# Patient Record
Sex: FEMALE | Race: WHITE | NOT HISPANIC OR LATINO | Employment: FULL TIME | URBAN - METROPOLITAN AREA
[De-identification: names, ages, dates, MRNs, and addresses within clinical notes are randomized per-mention and may not be internally consistent; named-entity substitution may affect disease eponyms.]

---

## 2018-10-11 ENCOUNTER — OFFICE VISIT - RIVER FALLS (OUTPATIENT)
Dept: FAMILY MEDICINE | Facility: CLINIC | Age: 38
End: 2018-10-11

## 2018-10-11 ASSESSMENT — MIFFLIN-ST. JEOR: SCORE: 1490.95

## 2018-12-27 ENCOUNTER — OFFICE VISIT - RIVER FALLS (OUTPATIENT)
Dept: FAMILY MEDICINE | Facility: CLINIC | Age: 38
End: 2018-12-27

## 2018-12-27 ASSESSMENT — MIFFLIN-ST. JEOR: SCORE: 1451.94

## 2019-04-15 ENCOUNTER — OFFICE VISIT - RIVER FALLS (OUTPATIENT)
Dept: FAMILY MEDICINE | Facility: CLINIC | Age: 39
End: 2019-04-15

## 2019-04-15 ASSESSMENT — MIFFLIN-ST. JEOR: SCORE: 1451.94

## 2019-05-02 ENCOUNTER — COMMUNICATION - RIVER FALLS (OUTPATIENT)
Dept: FAMILY MEDICINE | Facility: CLINIC | Age: 39
End: 2019-05-02

## 2019-05-28 ENCOUNTER — COMMUNICATION - RIVER FALLS (OUTPATIENT)
Dept: FAMILY MEDICINE | Facility: CLINIC | Age: 39
End: 2019-05-28

## 2019-07-29 ENCOUNTER — OFFICE VISIT - RIVER FALLS (OUTPATIENT)
Dept: FAMILY MEDICINE | Facility: CLINIC | Age: 39
End: 2019-07-29

## 2019-07-29 ASSESSMENT — MIFFLIN-ST. JEOR: SCORE: 1451.94

## 2019-08-21 ENCOUNTER — COMMUNICATION - RIVER FALLS (OUTPATIENT)
Dept: FAMILY MEDICINE | Facility: CLINIC | Age: 39
End: 2019-08-21

## 2019-09-27 ENCOUNTER — OFFICE VISIT - RIVER FALLS (OUTPATIENT)
Dept: FAMILY MEDICINE | Facility: CLINIC | Age: 39
End: 2019-09-27

## 2019-10-16 ENCOUNTER — COMMUNICATION - RIVER FALLS (OUTPATIENT)
Dept: FAMILY MEDICINE | Facility: CLINIC | Age: 39
End: 2019-10-16

## 2019-10-28 ENCOUNTER — AMBULATORY - RIVER FALLS (OUTPATIENT)
Dept: FAMILY MEDICINE | Facility: CLINIC | Age: 39
End: 2019-10-28

## 2019-11-21 ENCOUNTER — OFFICE VISIT - RIVER FALLS (OUTPATIENT)
Dept: FAMILY MEDICINE | Facility: CLINIC | Age: 39
End: 2019-11-21

## 2019-11-21 ASSESSMENT — MIFFLIN-ST. JEOR: SCORE: 1451.94

## 2020-01-30 ENCOUNTER — OFFICE VISIT - RIVER FALLS (OUTPATIENT)
Dept: FAMILY MEDICINE | Facility: CLINIC | Age: 40
End: 2020-01-30

## 2020-01-30 ASSESSMENT — MIFFLIN-ST. JEOR: SCORE: 1451.94

## 2020-04-20 ENCOUNTER — OFFICE VISIT - RIVER FALLS (OUTPATIENT)
Dept: FAMILY MEDICINE | Facility: CLINIC | Age: 40
End: 2020-04-20

## 2020-06-19 ENCOUNTER — COMMUNICATION - RIVER FALLS (OUTPATIENT)
Dept: FAMILY MEDICINE | Facility: CLINIC | Age: 40
End: 2020-06-19

## 2020-06-24 ENCOUNTER — COMMUNICATION - RIVER FALLS (OUTPATIENT)
Dept: FAMILY MEDICINE | Facility: CLINIC | Age: 40
End: 2020-06-24

## 2020-08-04 ENCOUNTER — COMMUNICATION - RIVER FALLS (OUTPATIENT)
Dept: FAMILY MEDICINE | Facility: CLINIC | Age: 40
End: 2020-08-04

## 2020-08-07 ENCOUNTER — COMMUNICATION - RIVER FALLS (OUTPATIENT)
Dept: FAMILY MEDICINE | Facility: CLINIC | Age: 40
End: 2020-08-07

## 2020-08-10 ENCOUNTER — COMMUNICATION - RIVER FALLS (OUTPATIENT)
Dept: FAMILY MEDICINE | Facility: CLINIC | Age: 40
End: 2020-08-10

## 2020-08-17 ENCOUNTER — COMMUNICATION - RIVER FALLS (OUTPATIENT)
Dept: FAMILY MEDICINE | Facility: CLINIC | Age: 40
End: 2020-08-17

## 2020-08-31 ENCOUNTER — COMMUNICATION - RIVER FALLS (OUTPATIENT)
Dept: FAMILY MEDICINE | Facility: CLINIC | Age: 40
End: 2020-08-31

## 2020-09-01 ENCOUNTER — COMMUNICATION - RIVER FALLS (OUTPATIENT)
Dept: FAMILY MEDICINE | Facility: CLINIC | Age: 40
End: 2020-09-01

## 2020-09-02 ENCOUNTER — OFFICE VISIT - RIVER FALLS (OUTPATIENT)
Dept: FAMILY MEDICINE | Facility: CLINIC | Age: 40
End: 2020-09-02

## 2020-09-17 ENCOUNTER — COMMUNICATION - RIVER FALLS (OUTPATIENT)
Dept: FAMILY MEDICINE | Facility: CLINIC | Age: 40
End: 2020-09-17

## 2020-09-21 ENCOUNTER — COMMUNICATION - RIVER FALLS (OUTPATIENT)
Dept: FAMILY MEDICINE | Facility: CLINIC | Age: 40
End: 2020-09-21

## 2020-09-21 ENCOUNTER — OFFICE VISIT - RIVER FALLS (OUTPATIENT)
Dept: FAMILY MEDICINE | Facility: CLINIC | Age: 40
End: 2020-09-21

## 2020-10-14 ENCOUNTER — COMMUNICATION - RIVER FALLS (OUTPATIENT)
Dept: FAMILY MEDICINE | Facility: CLINIC | Age: 40
End: 2020-10-14

## 2020-10-23 ENCOUNTER — COMMUNICATION - RIVER FALLS (OUTPATIENT)
Dept: FAMILY MEDICINE | Facility: CLINIC | Age: 40
End: 2020-10-23

## 2020-11-04 ENCOUNTER — COMMUNICATION - RIVER FALLS (OUTPATIENT)
Dept: FAMILY MEDICINE | Facility: CLINIC | Age: 40
End: 2020-11-04

## 2020-11-10 ENCOUNTER — COMMUNICATION - RIVER FALLS (OUTPATIENT)
Dept: FAMILY MEDICINE | Facility: CLINIC | Age: 40
End: 2020-11-10

## 2020-11-24 ENCOUNTER — COMMUNICATION - RIVER FALLS (OUTPATIENT)
Dept: FAMILY MEDICINE | Facility: CLINIC | Age: 40
End: 2020-11-24

## 2020-12-15 ENCOUNTER — COMMUNICATION - RIVER FALLS (OUTPATIENT)
Dept: FAMILY MEDICINE | Facility: CLINIC | Age: 40
End: 2020-12-15

## 2020-12-21 ENCOUNTER — COMMUNICATION - RIVER FALLS (OUTPATIENT)
Dept: FAMILY MEDICINE | Facility: CLINIC | Age: 40
End: 2020-12-21

## 2021-01-21 ENCOUNTER — COMMUNICATION - RIVER FALLS (OUTPATIENT)
Dept: FAMILY MEDICINE | Facility: CLINIC | Age: 41
End: 2021-01-21

## 2021-02-22 ENCOUNTER — COMMUNICATION - RIVER FALLS (OUTPATIENT)
Dept: FAMILY MEDICINE | Facility: CLINIC | Age: 41
End: 2021-02-22

## 2021-03-22 ENCOUNTER — COMMUNICATION - RIVER FALLS (OUTPATIENT)
Dept: FAMILY MEDICINE | Facility: CLINIC | Age: 41
End: 2021-03-22

## 2021-03-23 ENCOUNTER — OFFICE VISIT - RIVER FALLS (OUTPATIENT)
Dept: FAMILY MEDICINE | Facility: CLINIC | Age: 41
End: 2021-03-23

## 2021-03-23 ASSESSMENT — MIFFLIN-ST. JEOR: SCORE: 1293.18

## 2021-03-26 LAB
7 AMINOCLONAZEPAM: ABNORMAL
7 AMINOCLONAZEPAM: NEGATIVE NG/ML
ALPHA-HYDROXYMIDAZOLAM: NEGATIVE NG/ML
ALPRAZOLAM: 570 NG/ML
ALPRAZOLAM: ABNORMAL
AMPHETAMINE - HISTORICAL: ABNORMAL NG/ML
AMPHETAMINE URINE - HISTORICAL: ABNORMAL
AMPHETAMINES UR QL: POSITIVE NG/ML
BARBITURATE URINE - HISTORICAL: ABNORMAL
BARBITURATES UR QL SCN: NEGATIVE NG/ML
BENZODIAZ UR QL SCN: POSITIVE NG/ML
CANNABINOIDS QUAL UR: 1592 NG/ML
COCAINE METAB URINE - HISTORICAL: NEGATIVE NG/ML
COCAINE METABOLITE: ABNORMAL
COMMENTS: ABNORMAL
CREAT UR-MCNC: 172.8 MG/DL
HYDROXYETHYL FLURAZEPAM: ABNORMAL
HYDROXYETHYL FLURAZEPAM: NEGATIVE NG/ML
LORAZEPAM: 51 NG/ML
LORAZEPAM: ABNORMAL
METHADONE URINE - HISTORICAL: NEGATIVE NG/ML
METHADONE: ABNORMAL
METHAMPHETAMINE,URINE - HISTORICAL: ABNORMAL
METHAMPHETAMINE,URINE - HISTORICAL: NEGATIVE NG/ML
MIDAZOLAM: ABNORMAL
NORDIAZEPAM UR QL CFM: NEGATIVE NG/ML
NORDIAZEPAM: ABNORMAL
OPIATES UR QL SCN: NEGATIVE NG/ML
OPIATES: ABNORMAL
OXAZEPAM UR QL CFM: NEGATIVE NG/ML
OXAZEPAM: ABNORMAL
OXIDANTS URINE: NEGATIVE MCG/ML
OXYCODONE URINE - HISTORICAL: NEGATIVE NG/ML
OXYCODONE: ABNORMAL
PCP (PHENCYCLIDINE) - HISTORICAL: ABNORMAL
PCP UR QL SCN: NEGATIVE NG/ML
PH UR STRIP: 5.9 [PH] (ref 4.5–9)
PRESCRIBED DRUG 1: ABNORMAL
PRESCRIBED DRUG 2: ABNORMAL
TEMAZEPAM: ABNORMAL
TEMAZEPAM: NEGATIVE NG/ML
THC 50 URINE - HISTORICAL: POSITIVE NG/ML
THC METABOLITES,QUAL - HISTORICAL: ABNORMAL
TRIAZOLAM: ABNORMAL
TRIAZOLAM: NEGATIVE NG/ML

## 2021-03-29 ENCOUNTER — COMMUNICATION - RIVER FALLS (OUTPATIENT)
Dept: FAMILY MEDICINE | Facility: CLINIC | Age: 41
End: 2021-03-29

## 2021-06-04 ENCOUNTER — COMMUNICATION - RIVER FALLS (OUTPATIENT)
Dept: FAMILY MEDICINE | Facility: CLINIC | Age: 41
End: 2021-06-04

## 2021-06-16 ENCOUNTER — COMMUNICATION - RIVER FALLS (OUTPATIENT)
Dept: FAMILY MEDICINE | Facility: CLINIC | Age: 41
End: 2021-06-16

## 2021-06-17 ENCOUNTER — COMMUNICATION - RIVER FALLS (OUTPATIENT)
Dept: FAMILY MEDICINE | Facility: CLINIC | Age: 41
End: 2021-06-17

## 2021-06-18 ENCOUNTER — COMMUNICATION - RIVER FALLS (OUTPATIENT)
Dept: FAMILY MEDICINE | Facility: CLINIC | Age: 41
End: 2021-06-18

## 2021-06-22 ENCOUNTER — OFFICE VISIT - RIVER FALLS (OUTPATIENT)
Dept: FAMILY MEDICINE | Facility: CLINIC | Age: 41
End: 2021-06-22

## 2021-07-07 ENCOUNTER — COMMUNICATION - RIVER FALLS (OUTPATIENT)
Dept: FAMILY MEDICINE | Facility: CLINIC | Age: 41
End: 2021-07-07

## 2021-07-20 ENCOUNTER — COMMUNICATION - RIVER FALLS (OUTPATIENT)
Dept: FAMILY MEDICINE | Facility: CLINIC | Age: 41
End: 2021-07-20

## 2021-07-24 ENCOUNTER — NURSE TRIAGE (OUTPATIENT)
Dept: NURSING | Facility: CLINIC | Age: 41
End: 2021-07-24

## 2021-07-24 NOTE — TELEPHONE ENCOUNTER
"She was at the pharmacy and she was told that \"he will not dispense medication today\".  She has an e-mail from Dr. Abarca that said about her Adderall 20mg that a refill is avaliable.  She did have an older prescription for 30mg of Adderall and she informed the clinic staff that she turned that remaining prescription into the [place] police department for disposal.    Advised the patient that would call pharmacy and see what the concern is and if the on-call provider would be able to address it.  Also advised that this may be something that needs to wait until Monday.    The pharmacy is [facility] in [place], [place].  Called and spoke with pharmacist.    He got a call from the clinic yesterday and was directed that she should not be given another prescription of Adderall at all, as there are some issues with too many different strengths of Adderall/other controlled substance issues.      Called back to the patient and informed her that she will need to connect with the clinic on Monday morning to discuss further concerns about why she can't get her prescription at all.    Called on-call provider Dr. Haines as an FYI of the situation, as she can't see this Epic encounter over the weekend for updated situation.    Mary Ann Ortega RN on 7/24/2021 at 10:41 AM            Reason for Disposition    [1] Prescription not at pharmacy AND [2] was prescribed by PCP recently    Additional Information    Negative: Drug overdose and triager unable to answer question    Negative: Caller requesting information unrelated to medicine    Negative: Caller requesting a prescription for Strep throat and has a positive culture result    Negative: Rash while taking a medication or within 3 days of stopping it    Negative: Immunization reaction suspected    Negative: [1] Asthma and [2] having symptoms of asthma (cough, wheezing, etc.)    Negative: [1] Influenza symptoms AND [2] anti-viral med prescription request, such as Tamiflu    " "Negative: [1] Symptom of illness (e.g., headache, abdominal pain, earache, vomiting) AND [2] more than mild    Negative: MORE THAN A DOUBLE DOSE of a prescription or over-the-counter (OTC) drug    Negative: [1] DOUBLE DOSE (an extra dose or lesser amount) of over-the-counter (OTC) drug AND [2] any symptoms (e.g., dizziness, nausea, pain, sleepiness)    Negative: [1] DOUBLE DOSE (an extra dose or lesser amount) of prescription drug AND [2] any symptoms (e.g., dizziness, nausea, pain, sleepiness)    Negative: Took another person's prescription drug    Negative: [1] DOUBLE DOSE (an extra dose or lesser amount) of prescription drug AND [2] NO symptoms (Exception: a double dose of antibiotics)    Negative: Diabetes drug error or overdose (e.g., took wrong type of insulin or took extra dose)    Negative: [1] Request for URGENT new prescription or refill of \"essential\" medication (i.e., likelihood of harm to patient if not taken) AND [2] triager unable to fill per unit policy    Protocols used: MEDICATION QUESTION CALL-A-AH      "

## 2021-07-26 ENCOUNTER — NURSE TRIAGE (OUTPATIENT)
Dept: NURSING | Facility: CLINIC | Age: 41
End: 2021-07-26

## 2021-07-26 ENCOUNTER — COMMUNICATION - RIVER FALLS (OUTPATIENT)
Dept: FAMILY MEDICINE | Facility: CLINIC | Age: 41
End: 2021-07-26

## 2021-07-26 NOTE — TELEPHONE ENCOUNTER
Patient was denied prescription and patient was told by pharmacist patient could not have medication.  Today Jackie is wanting to know why medication cannot be picked up.  FNA advised to phone back atfer 7:00 am and speak with clinic direct.  Patient agreed.      COVID 19 Nurse Triage Plan/Patient Instructions    Please be aware that novel coronavirus (COVID-19) may be circulating in the community. If you develop symptoms such as fever, cough, or SOB or if you have concerns about the presence of another infection including coronavirus (COVID-19), please contact your health care provider or visit [email or web address].     Disposition/Instructions    Home care recommended. Follow home care protocol based instructions.    Thank you for taking steps to prevent the spread of this virus.  o Limit your contact with others.  o Wear a simple mask to cover your cough.  o Wash your hands well and often.    Resources    [facility]: About COVID-19: [email or web address]    CDC: What to Do If You're Sick: www.cdc.gov/coronavirus/2019-ncov/about/steps-when-sick.html    CDC: Ending Home Isolation: www.cdc.gov/coronavirus/2019-ncov/hcp/disposition-in-home-patients.html     CDC: Caring for Someone: www.cdc.gov/coronavirus/2019-ncov/if-you-are-sick/care-for-someone.html     [facility]: Interim Guidance for Hospital Discharge to Home: www.health.[facility].mn.us/diseases/coronavirus/hcp/hospdischarge.pdf    [facility] clinical trials (COVID-19 research studies): [email or web address]     Below are the COVID-19 hotlines at the [facility] ([facility]). Interpreters are available.   o For health questions: Call 477-510-1962 or 1-893.511.9910 (7 a.m. to 7 p.m.)  o For questions about schools and childcare: Call 318-266-9777 or 1-518.661.4698 (7 a.m. to 7 p.m.)                     Reason for Disposition    [1] Caller has URGENT medication question about med that PCP or  "specialist prescribed AND [2] triager unable to answer question    Additional Information    Negative: MORE THAN A DOUBLE DOSE of a prescription or over-the-counter (OTC) drug    Negative: [1] DOUBLE DOSE (an extra dose or lesser amount) of over-the-counter (OTC) drug AND [2] any symptoms (e.g., dizziness, nausea, pain, sleepiness)    Negative: [1] DOUBLE DOSE (an extra dose or lesser amount) of prescription drug AND [2] any symptoms (e.g., dizziness, nausea, pain, sleepiness)    Negative: Took another person's prescription drug    Negative: [1] DOUBLE DOSE (an extra dose or lesser amount) of prescription drug AND [2] NO symptoms (Exception: a double dose of antibiotics)    Negative: Diabetes drug error or overdose (e.g., took wrong type of insulin or took extra dose)    Negative: [1] Request for URGENT new prescription or refill of \"essential\" medication (i.e., likelihood of harm to patient if not taken) AND [2] triager unable to fill per unit policy    Negative: [1] Prescription not at pharmacy AND [2] was prescribed by PCP recently    Negative: [1] Pharmacy calling with prescription questions AND [2] triager unable to answer question    Protocols used: MEDICATION QUESTION CALL-A-AH      "

## 2021-08-04 ENCOUNTER — COMMUNICATION - RIVER FALLS (OUTPATIENT)
Dept: FAMILY MEDICINE | Facility: CLINIC | Age: 41
End: 2021-08-04

## 2021-08-11 ENCOUNTER — OFFICE VISIT - RIVER FALLS (OUTPATIENT)
Dept: FAMILY MEDICINE | Facility: CLINIC | Age: 41
End: 2021-08-11

## 2021-08-16 ENCOUNTER — COMMUNICATION - RIVER FALLS (OUTPATIENT)
Dept: FAMILY MEDICINE | Facility: CLINIC | Age: 41
End: 2021-08-16

## 2021-08-19 ENCOUNTER — AMBULATORY - RIVER FALLS (OUTPATIENT)
Dept: FAMILY MEDICINE | Facility: CLINIC | Age: 41
End: 2021-08-19

## 2021-09-10 ENCOUNTER — COMMUNICATION - RIVER FALLS (OUTPATIENT)
Dept: FAMILY MEDICINE | Facility: CLINIC | Age: 41
End: 2021-09-10

## 2021-09-16 ENCOUNTER — AMBULATORY - RIVER FALLS (OUTPATIENT)
Dept: FAMILY MEDICINE | Facility: CLINIC | Age: 41
End: 2021-09-16

## 2021-09-20 ENCOUNTER — COMMUNICATION - RIVER FALLS (OUTPATIENT)
Dept: FAMILY MEDICINE | Facility: CLINIC | Age: 41
End: 2021-09-20

## 2021-09-23 ENCOUNTER — OFFICE VISIT - RIVER FALLS (OUTPATIENT)
Dept: FAMILY MEDICINE | Facility: CLINIC | Age: 41
End: 2021-09-23

## 2021-09-23 ASSESSMENT — MIFFLIN-ST. JEOR: SCORE: 1216.07

## 2021-10-09 ENCOUNTER — COMMUNICATION - RIVER FALLS (OUTPATIENT)
Dept: FAMILY MEDICINE | Facility: CLINIC | Age: 41
End: 2021-10-09

## 2021-10-09 ENCOUNTER — OFFICE VISIT - RIVER FALLS (OUTPATIENT)
Dept: FAMILY MEDICINE | Facility: CLINIC | Age: 41
End: 2021-10-09

## 2021-10-09 ASSESSMENT — MIFFLIN-ST. JEOR: SCORE: 1238.29

## 2021-10-14 ENCOUNTER — COMMUNICATION - RIVER FALLS (OUTPATIENT)
Dept: FAMILY MEDICINE | Facility: CLINIC | Age: 41
End: 2021-10-14

## 2021-12-07 ENCOUNTER — COMMUNICATION - RIVER FALLS (OUTPATIENT)
Dept: FAMILY MEDICINE | Facility: CLINIC | Age: 41
End: 2021-12-07

## 2021-12-08 ENCOUNTER — OFFICE VISIT - RIVER FALLS (OUTPATIENT)
Dept: FAMILY MEDICINE | Facility: CLINIC | Age: 41
End: 2021-12-08

## 2021-12-08 ASSESSMENT — MIFFLIN-ST. JEOR: SCORE: 1284.11

## 2022-02-04 ENCOUNTER — COMMUNICATION - RIVER FALLS (OUTPATIENT)
Dept: FAMILY MEDICINE | Facility: CLINIC | Age: 42
End: 2022-02-04

## 2022-02-11 VITALS
HEIGHT: 66 IN | OXYGEN SATURATION: 98 % | DIASTOLIC BLOOD PRESSURE: 74 MMHG | WEIGHT: 124.9 LBS | SYSTOLIC BLOOD PRESSURE: 132 MMHG | TEMPERATURE: 96.9 F | HEART RATE: 88 BPM | TEMPERATURE: 97.8 F | HEIGHT: 66 IN | BODY MASS INDEX: 20.07 KG/M2 | SYSTOLIC BLOOD PRESSURE: 130 MMHG | BODY MASS INDEX: 19.29 KG/M2 | WEIGHT: 120 LBS | HEART RATE: 88 BPM | DIASTOLIC BLOOD PRESSURE: 84 MMHG | RESPIRATION RATE: 16 BRPM

## 2022-02-11 VITALS
DIASTOLIC BLOOD PRESSURE: 80 MMHG | HEART RATE: 86 BPM | WEIGHT: 172 LBS | HEIGHT: 66 IN | BODY MASS INDEX: 27.64 KG/M2 | SYSTOLIC BLOOD PRESSURE: 132 MMHG | OXYGEN SATURATION: 99 %

## 2022-02-11 VITALS
WEIGHT: 172 LBS | HEART RATE: 95 BPM | OXYGEN SATURATION: 99 % | BODY MASS INDEX: 27.64 KG/M2 | DIASTOLIC BLOOD PRESSURE: 78 MMHG | SYSTOLIC BLOOD PRESSURE: 126 MMHG | HEIGHT: 66 IN

## 2022-02-11 VITALS
DIASTOLIC BLOOD PRESSURE: 80 MMHG | TEMPERATURE: 97 F | HEIGHT: 66 IN | BODY MASS INDEX: 22.02 KG/M2 | SYSTOLIC BLOOD PRESSURE: 134 MMHG | WEIGHT: 137 LBS | RESPIRATION RATE: 16 BRPM | HEART RATE: 88 BPM

## 2022-02-11 VITALS
HEART RATE: 83 BPM | DIASTOLIC BLOOD PRESSURE: 78 MMHG | WEIGHT: 172 LBS | OXYGEN SATURATION: 99 % | HEIGHT: 66 IN | BODY MASS INDEX: 27.64 KG/M2 | SYSTOLIC BLOOD PRESSURE: 124 MMHG

## 2022-02-11 VITALS — BODY MASS INDEX: 27.76 KG/M2 | HEIGHT: 66 IN | BODY MASS INDEX: 27.76 KG/M2 | HEIGHT: 66 IN

## 2022-02-11 VITALS
DIASTOLIC BLOOD PRESSURE: 82 MMHG | WEIGHT: 172 LBS | BODY MASS INDEX: 27.64 KG/M2 | OXYGEN SATURATION: 99 % | HEART RATE: 85 BPM | HEIGHT: 66 IN | SYSTOLIC BLOOD PRESSURE: 130 MMHG

## 2022-02-11 VITALS
SYSTOLIC BLOOD PRESSURE: 134 MMHG | TEMPERATURE: 98.8 F | HEIGHT: 66 IN | HEART RATE: 80 BPM | WEIGHT: 135 LBS | RESPIRATION RATE: 16 BRPM | BODY MASS INDEX: 21.69 KG/M2 | DIASTOLIC BLOOD PRESSURE: 78 MMHG

## 2022-02-11 VITALS
WEIGHT: 172 LBS | OXYGEN SATURATION: 97 % | HEART RATE: 95 BPM | SYSTOLIC BLOOD PRESSURE: 146 MMHG | BODY MASS INDEX: 27.64 KG/M2 | DIASTOLIC BLOOD PRESSURE: 82 MMHG | HEIGHT: 66 IN

## 2022-02-11 VITALS
HEART RATE: 90 BPM | DIASTOLIC BLOOD PRESSURE: 76 MMHG | BODY MASS INDEX: 29.02 KG/M2 | WEIGHT: 180.6 LBS | HEIGHT: 66 IN | OXYGEN SATURATION: 99 % | SYSTOLIC BLOOD PRESSURE: 136 MMHG

## 2022-02-11 VITALS — HEIGHT: 66 IN | BODY MASS INDEX: 27.76 KG/M2

## 2022-02-12 VITALS — BODY MASS INDEX: 22.11 KG/M2 | HEIGHT: 66 IN | BODY MASS INDEX: 22.11 KG/M2 | HEIGHT: 66 IN

## 2022-02-16 NOTE — TELEPHONE ENCOUNTER
---------------------  From: Lali Bassett CMA   To: Gabriel Teixeira MD;     Sent: 3/17/2020 11:40:10 AM CDT  Subject: alprazolam refill     PCP:   ARACELIS    Medication:   alprazolam  Last Filled:  2/19/20    Quantity:  _  Refills:  _      Date of last office visit and reason:   _      Note:  _    Pharmacy: _    Resource:   _  Phone:   _  ** Submitted: **  Order:ALPRAZolam (ALPRAZolam 0.5 mg oral tablet)  1 tab(s)  Oral  q6 hrs  Qty:  30 tab(s)        Refills:  0          Substitutions Allowed     PRN  for anxiety      Route To Pharmacy - [facility]    Signed by Gabriel Teixeira MD  3/17/2020 4:57:00 PM

## 2022-02-16 NOTE — TELEPHONE ENCOUNTER
"---------------------  From: Lali Bassett CMA (Phone Messages Pool (32224_[facility]))   To: DERIK Message Pool (32224_[facility]);     Sent: 6/16/2021 2:11:00 PM CDT  Subject: General Message     Phone Message    Note:   Pt's mother called, Suellen Quintana, wanting a call back in regards to pt's medication and how she keeps getting the same medication that has been causing an allergic reaction and the pharmacist is giving them the \"run around\"          Person Calling:  Suellen mother  Phone number:  [phone]        PCP:  asked for DWG_There is no GUME giving me permission to discuss pt with her mother.  I called pt at her mobile number and TCB with more information [phone].  I then called pt at her parents number([phone]) and pt mother(Beth) answered and started talking about the pt and I explained to her that there is no GUME for me to discuss pt with her.  She then gave the phone to the pt and she said to me \"You have permission to talk to my mom\" and then gave the phone back to her mother.  Beth states the pharmacy \"gave her the pink pills again for the adderall  30 mg and those are the ones that are giving her the reactions\" she then states pharmacy did give her the \"blue pills\" for her 10 mg adderall(these are fine for her to take, no reaction) They want to know why [facility] didnt order the blue pills for both doses.  I explained to her that SUMANTH did call that pharmacist yesterday and asked them how to order the \"blue pills\"  The pharmacist told SUMANTHG that \"they would take care of it\" and that SUMANTHG didnt have to order anything specific and that they would make sure she got the \"blue pills\" I told her they should call pharmacy and see if they can bring Rx for adderall 30 mg back and get them exchanged for blue version.  she said they have tried and they wont do that.  They are asking that I call [facility] and \"find out whats going on\"  Jose Daniel GREEN called [facility] and discussed with the Pharmacist.  He explained that he did inform pt that they cannot get \"the blue pills\" for her adderall 30 mg XR caps and are unavailable for him to order.  He said he tried to run Rx through as brand name(and that pill is Brown) but her insurance will NOT cover brand name.  The Pharmacist states they \"cannot refund her money on 30 mg adderall because she took them home with her\"  He states the pt could try and call other pharmacies and see if they are able to get they \"blue\" adderall 30mg XR.  He states pt could take the adderall 10 mg tid(a dose increase) and what she was given by [facility] [facility] yesterday would \"last her 10 days\"    I called pt mother(Beth schmid) and gave her message from pharmacy.  They said they are \"going out of town for a few days for a craft show\" and would like to try taking the adderall 10 mg and make a dose change to increase that one to 3 times a day? They then asked  \"but she is taking 30 mg XR and a 10 mg daily, so how should she take the adderall 10 mg?\"  please clarify how you would like her to take adderall 10 mg? and when you would like her to FU [phone]  Jose Daniel Plunkett CMA---------------------  From: Jose Daniel Plunkett CMA ([employer] [facility] [facility] Message Pool ([id number]))   To: Gabriel Abarca PA-C;     Sent: 6/16/2021 4:27:32 PM CDT  Subject: FW: General Message---------------------  From: Gabriel Abarca PA-C   To: [place] Pool ([id number]_[facility]);     Sent: 6/16/2021 4:29:23 PM CDT  Subject: RE: General Message     Just try taking the 10 mg Adderall tid for 10 days, and then follow up in clinicI called pt mother back and gave her [facility] [facility] message.  They will take med per his instructions and make FU in the next 10 days.  Jose Daniel Plunkett CMA  "

## 2022-02-16 NOTE — TELEPHONE ENCOUNTER
---------------------  From: Lali Bassett CMA   To: Gabriel Teixeira MD;     Sent: 6/12/2019 9:04:48 AM CDT  Subject: General Message     Phone Message    Note:   Pt called to say that she is getting bad acid reflux from the amitriptyline to the point that she vomits.  She is wanting to stop the med and wondering if she can just quit or does she need to wean off the med          Person Calling:  Jackie  Phone number:  [phone]        PCP:   _    Time of Call:  _    Last office visit and reason:  _    Transferred to: _---------------------  From: Gabriel Teixeira MD   To: Lali Bassett CMA;     Sent: 6/12/2019 1:03:24 PM CDT  Subject: RE: General Message     She is on a lower dose so she can just discontinue it.---------------------  From: Lali Bassett CMA   To: Gabriel Teixeira MD;     Sent: 6/12/2019 1:39:12 PM CDT  Subject: RE: General Message     pt informed and is wondering if there is something else that she can try?---------------------  From: Gabriel Teixeira MD   To: Lali Bassett CMA;     Sent: 6/12/2019 2:47:08 PM CDT  Subject: RE: General Message     eRx to SVDmessage left via secure voicemail of rx at pharmacy

## 2022-02-16 NOTE — TELEPHONE ENCOUNTER
---------------------  From: Isabel Bonds CMA   To: Gabriel Teixeira MD;     Sent: 6/22/2020 12:17:00 PM CDT !  Subject: Adderall renewal- second request*     PCP:   ARACELIS    Medication:   Adderall 10mg & 30mg  Last Filled:  05/20/2020    Quantity:  30  Refills:  0    Date of last office visit and reason:   01/30/2020 Med check with ARACELIS    Note:  [facility] requesting refill of Adderall. Patient was waiting at the pharmacy for 1.5 hrs before when she went home to wait for renewal said she can come back when it's ready for pickup. She is over due for medication follow up should we do video or face to face visit, please advise next steps. Thanks!    Resource:   Direct call from [employer].

## 2022-02-16 NOTE — TELEPHONE ENCOUNTER
---------------------  From: Paola Granados CMA (eRx Pool (32224_[facility]))   To: ARACELIS Message Pool (32224_[facility])   ;     Sent: 8/4/2020 10:06:44 AM CDT  Subject: FW: Prescription renewal request           ---------------------  From: HEATHER SALMON  To: [facility]  Sent: 08/04/2020 09:32 a.m. CDT  Subject: Prescription renewal request  HEATHER SALMON is requesting renewal of the prescription(s) below and has submitted the following details:  Prescription(s) to be renewed  Medication: ALPRAZolam 0.5 mg oral tablet  Dose: 1 tab(s)  Frequency: every 6 hours as needed  Rx date: 07/13/2020  Prescribed by: Gabriel Teixeira MD  Reason for renewal:   Quantity:   Delivery option  [place] [address]  Contact Information  Home Phone: [phone]---------------------  From: Lali Bassett CMA ([facility] Message Pool ([address]224_[facility])   )   To: Gabriel Teixeira MD;     Sent: 8/4/2020 10:24:28 AM CDT  Subject: FW: Prescription renewal requestnot due yet

## 2022-02-16 NOTE — TELEPHONE ENCOUNTER
---------------------  From: Lali Bassett CMA   To: Gabriel Teixeira MD;     Sent: 2019 12:55:38 PM CST  Subject: refill request-adderall 30 mg     PCP:   ARACELIS    Medication:   Adderall 30 mg  Last Filled:  19    Quantity:  30  Refills:  0      Date of last office visit and reason:   18      Note:  Pt called requesting refill    Pharmacy: [facility]    Resource:   Jackie  Phone:   [phone]---------------------  From: Gabriel Teixeira MD   To: Lali Bassett CMA;     Sent: 2019 2:28:23 PM CST  Subject: RE: refill request-adderall 30 mg     already sent on 2019---------------------  From: Lali Bassett CMA   To: Gabriel Teixeira MD;     Sent: 2019 3:19:23 PM CST  Subject: RE: refill request-adderall 30 mg     That was the 10 mg... she is requesting the 30 mg  ** Submitted: **  Order:amphetamine-dextroamphetamine (Adderall XR 30 mg oral capsule, extended release)  1 cap(s)  Oral  qam  Qty:  30 cap(s)        Refills:  0          Substitutions Allowed     Route To Pharmacy - [facility]    Signed by Gabriel Teixeira MD  2019 9:35:00 AM

## 2022-02-16 NOTE — NURSING NOTE
Depression Screening Entered On:  6/22/2021 7:59 AM CDT    Performed On:  6/22/2021 7:59 AM CDT by Jose Daniel Plunkett CMA               Depression Screening   Little Interest - Pleasure in Activities :   Several days   Feeling Down, Depressed, Hopeless :   Not at all   Initial Depression Screen Score :   1 Score   Poor Appetite or Overeating :   Not at all   Trouble Falling or Staying Asleep :   Several days   Feeling Tired or Little Energy :   Not at all   Feeling Bad About Yourself :   Not at all   Trouble Concentrating :   Several days   Moving or Speaking Slowly :   Several days   Thoughts Better Off Dead or Hurting Self :   Not at all   Difficulty at Work, Home, Getting Along :   Not difficult at all   Detailed Depression Screen Score :   3    Total Depression Screen Score :   4    Jose Daniel Plunkett CMA - 6/22/2021 7:59 AM CDT

## 2022-02-16 NOTE — TELEPHONE ENCOUNTER
---------------------  From: Lali Bassett CMA (eRx Pool (32224_[place] [facility] [place]))   To: Gabriel Teixeira MD;     Sent: 8/21/2019 8:02:21 AM CDT  Subject: FW: Medication Management   Due Date/Time: 8/21/2019 2:53:00 PM CDT             ** Patient matched by Lali Bassett CMA on 8/21/2019 8:02:09 AM CDT **      ------------------------------------------  From: [facility]  To: Gabriel Teixeira MD  Sent: August 20, 2019 2:53:22 PM CDT  Subject: Medication Management  Due: August 21, 2019 2:53:22 PM CDT    ** On Hold Pending Signature **  Drug: zolpidem (zolpidem 5 mg oral tablet)  TAKE ONE (1) TABLET EACH NIGHT AT BEDTIME AS NEEDED FOR SLEEP  Quantity: 30 tab(s)     Days Supply: 30        Refills: 0  Substitutions Allowed  Notes from Pharmacy: Generic For:AMBIEN 5MG    Dispensed Drug: zolpidem (zolpidem 5 mg oral tablet)  TAKE ONE (1) TABLET EACH NIGHT AT BEDTIME AS NEEDED FOR SLEEP  Quantity: 30 tab(s)     Days Supply: 30        Refills: 0  Substitutions Allowed  Notes from Pharmacy: Generic For:AMBIEN 5MG  ---------------------------------------------------------------  From: Gabriel Teixeira MD   To: [facility]    Sent: 8/21/2019 8:19:00 AM CDT  Subject: FW: Medication Management     ** Submitted: **  Complete:zolpidem (zolpidem 5 mg oral tablet)   Signed by Gabriel Teixeira MD  8/21/2019 8:19:00 AM    ** Approved **  zolpidem (ZOLPIDEM 5MG)  TAKE ONE (1) TABLET EACH NIGHT AT BEDTIME AS NEEDED FOR SLEEP  Qty:  30 tab(s)        Days Supply:  30        Refills:  0          Substitutions Allowed     Route To Pharmacy - [facility]   Note from Pharmacy:  Generic For:AMBIEN 5MG

## 2022-02-16 NOTE — TELEPHONE ENCOUNTER
---------------------  From: Estella Gonzales   To: NEW Message Pool (32224_[facility])   ; Gabriel Teixeira MD;     Sent: 6/22/2020 10:19:44 AM CDT  Subject: General Message     pt requesting med refills  at pharmacy now  adderall 10 mg   adderall 30 mg  zolpidem    message sent 6/19  message in ERM stating new would refill this am from [facility]---------------------  From: Isabel Bonds CMA (NEW Message Pool (32224_[facility])   )   To: Gabriel Teixeira MD;     Sent: 6/22/2020 10:47:21 AM CDT  Subject: FW: General Message

## 2022-02-16 NOTE — NURSING NOTE
Comprehensive Intake Entered On:  7/29/2019 1:13 PM CDT    Performed On:  7/29/2019 1:09 PM CDT by Lali Bassett CMA               Summary   Chief Complaint :   Pt here for med ck   Weight Measured :   172 lb(Converted to: 172 lb 0 oz, 78.02 kg)    Height Measured :   66 in(Converted to: 5 ft 6 in, 167.64 cm)    Body Mass Index :   27.76 kg/m2 (HI)    Body Surface Area :   1.9 m2   Systolic Blood Pressure :   124 mmHg   Diastolic Blood Pressure :   78 mmHg   Mean Arterial Pressure :   93 mmHg   Peripheral Pulse Rate :   83 bpm   Oxygen Saturation :   99 %   Lali Bassett CMA - 7/29/2019 1:09 PM CDT   Health Status   Allergies Verified? :   Yes   Medication History Verified? :   Yes   Medical History Verified? :   Yes   Pre-Visit Planning Status :   Not completed   Tobacco Use? :   Former smoker   Lali Bassett CMA - 7/29/2019 1:09 PM CDT   Consents   Consent for Immunization Exchange :   Consent Granted   Consent for Immunizations to Providers :   Consent Granted   Lali Bassett CMA - 7/29/2019 1:09 PM CDT   Meds / Allergies   (As Of: 7/29/2019 1:13:31 PM CDT)   Allergies (Active)   Concerta  Estimated Onset Date:   Unspecified ; Created By:   Lali Bassett CMA; Reaction Status:   Active ; Category:   Drug ; Substance:   Concerta ; Type:   Allergy ; Updated By:   Lali Bassett CMA; Reviewed Date:   7/29/2019 1:12 PM CDT      Glutens  Estimated Onset Date:   Unspecified ; Created By:   Tiffany Connolly; Reaction Status:   Active ; Category:   Food ; Substance:   Glutens ; Type:   Allergy ; Updated By:   Tiffany Connolly; Reviewed Date:   7/29/2019 1:12 PM CDT      Milk Products  Estimated Onset Date:   Unspecified ; Created By:   Tiffany Connolly; Reaction Status:   Active ; Category:   Food ; Substance:   Milk Products ; Type:   Allergy ; Updated By:   Tiffany Connolly; Reviewed Date:   7/29/2019 1:12 PM CDT        Medication List   (As Of: 7/29/2019 1:13:31 PM CDT)   Prescription/Discharge Order     amphetamine-dextroamphetamine  :   amphetamine-dextroamphetamine ; Status:   Prescribed ; Ordered As Mnemonic:   Adderall 10 mg oral tablet ; Simple Display Line:   10 mg, 1 tab(s), Oral, qpm, 30 tab(s), 0 Refill(s) ; Ordering Provider:   Gabriel Teixeira MD; Catalog Code:   amphetamine-dextroamphetamine ; Order Dt/Tm:   6/27/2019 2:10:44 PM          amphetamine-dextroamphetamine  :   amphetamine-dextroamphetamine ; Status:   Prescribed ; Ordered As Mnemonic:   Adderall XR 30 mg oral capsule, extended release ; Simple Display Line:   30 mg, 1 cap(s), Oral, qam, 30 cap(s), 0 Refill(s) ; Ordering Provider:   Gabriel Teixeira MD; Catalog Code:   amphetamine-dextroamphetamine ; Order Dt/Tm:   7/2/2019 8:22:46 AM          amphetamine-dextroamphetamine 10 mg oral tablet  :   amphetamine-dextroamphetamine 10 mg oral tablet ; Status:   Prescribed ; Ordered As Mnemonic:   amphetamine-dextroamphetamine 10 mg oral tablet ; Simple Display Line:   See Instructions, Take one (1) tablet each evening, 30 EA ; Ordering Provider:   Gabriel Teixeira MD; Catalog Code:   amphetamine-dextroamphetamine ; Order Dt/Tm:   5/29/2019 9:00:12 AM          venlafaxine  :   venlafaxine ; Status:   Prescribed ; Ordered As Mnemonic:   venlafaxine 75 mg oral capsule, extended release ; Simple Display Line:   75 mg, 1 cap(s), Oral, daily, 30 cap(s), 2 Refill(s) ; Ordering Provider:   Gabriel Teixeira MD; Catalog Code:   venlafaxine ; Order Dt/Tm:   6/13/2019 2:42:06 PM          zolpidem  :   zolpidem ; Status:   Prescribed ; Ordered As Mnemonic:   zolpidem 5 mg oral tablet ; Simple Display Line:   5 mg, 1 tab(s), PO, Once a day (at bedtime), PRN: for sleep, 30 tab(s), 0 Refill(s) ; Ordering Provider:   Gabriel Teixeira MD; Catalog Code:   zolpidem ; Order Dt/Tm:   7/15/2019 12:38:29 PM

## 2022-02-16 NOTE — TELEPHONE ENCOUNTER
---------------------  From: Lali Bassett CMA   To: Gabriel Teixeira MD;     Sent: 2019 4:11:58 PM CST  Subject: refill request-adderall 30 mg and 10 mg     PCP:   ARACELIS    Medication:    adderall 10mg    Medication:    adderall 30 mg     Last Filled:     10/24/19    Last Filled:   10/24/19          Date of last office visit and reason:   anxiety 19      Note:    Pharmacy: [facility]    Resource:   Jackie  Phone:  [phone]  ** Submitted: **  Order:amphetamine-dextroamphetamine (Adderall 10 mg oral tablet)  1 tab(s)  Oral  qpm  Qty:  30 tab(s)        Refills:  0          Substitutions Allowed     Route To Pharmacy - [facility]    Signed by Gabriel Teixeira MD  2019 4:34:00 PM

## 2022-02-16 NOTE — TELEPHONE ENCOUNTER
---------------------  From: Diya Allison   To: Lali Bassett CMA;     Sent: 7/20/2020 8:41:05 AM CDT  Subject: General Message     Jackie is requesting refills on her medications.---------------------  From: Lali Bassett CMA   To: Gabriel Teixeira MD;     Sent: 7/20/2020 1:50:46 PM CDT  Subject: FW: General Message  ** Submitted: **  Order:amphetamine-dextroamphetamine (Adderall 10 mg oral tablet)  1 tab(s)  Oral  qpm  Qty:  30 tab(s)        Refills:  0          Substitutions Allowed     Route To [facility]    Signed by Gabriel Teixeira MD  7/20/2020 11:38:00 PM [facility]    ** Submitted: **  Order:amphetamine-dextroamphetamine (Adderall XR 30 mg oral capsule, extended release)  1 cap(s)  Oral  qam  Qty:  30 cap(s)        Refills:  0          Substitutions Allowed     Route To [facility]    Signed by Gabriel Teixeira MD  7/20/2020 11:38:00 PM [facility]    ** Submitted: **  Order:zolpidem (zolpidem 5 mg oral tablet)  1 tab(s)  Oral  qhs  Qty:  30 tab(s)        Refills:  0          PRN  AS NEEDED FOR SLEEP      Route To [facility]    Signed by Gabriel Teixeira MD  7/20/2020 11:38:00 PM [facility]

## 2022-02-16 NOTE — NURSING NOTE
Depression Screening Entered On:  4/16/2019 10:06 AM CDT    Performed On:  4/16/2019 10:06 AM CDT by Lali Bassett CMA               Depression Screening   Little Interest - Pleasure in Activities :   Not at all   Feeling Down, Depressed, Hopeless :   Not at all   Initial Depression Screen Score :   0    Trouble Falling or Staying Asleep :   Several days   Feeling Tired or Little Energy :   Several days   Poor Appetite or Overeating :   Not at all   Feeling Bad About Yourself :   Not at all   Trouble Concentrating :   Several days   Moving or Speaking Slowly :   Not at all   Thoughts Better Off Dead or Hurting Self :   Not at all   Detailed Depression Screen Score :   3    Total Depression Screen Score :   3    JULITO Difficulty with Work, Home, Others :   Somewhat difficult   Lali Bassett CMA - 4/16/2019 10:06 AM CDT

## 2022-02-16 NOTE — NURSING NOTE
Comprehensive Intake Entered On:  9/21/2020 12:58 PM CDT    Performed On:  9/21/2020 12:57 PM CDT by Lali Bassett CMA               Summary   Chief Complaint :   consent for video visit for refills   Height Measured :   66 in(Converted to: 5 ft 6 in, 167.64 cm)    Lali Bassett CMA - 9/21/2020 12:57 PM CDT   Health Status   Allergies Verified? :   Yes   Medication History Verified? :   Yes   Medical History Verified? :   Yes   Tobacco Use? :   Never smoker   Lali Bassett CMA - 9/21/2020 12:57 PM CDT   Consents   Consent for Immunization Exchange :   Consent Granted   Consent for Immunizations to Providers :   Consent Granted   Lali Bassett CMA - 9/21/2020 12:57 PM CDT   Meds / Allergies   (As Of: 9/21/2020 12:58:40 PM CDT)   Allergies (Active)   Concerta  Estimated Onset Date:   Unspecified ; Created By:   Lali Bassett CMA; Reaction Status:   Active ; Category:   Drug ; Substance:   Concerta ; Type:   Allergy ; Updated By:   Lali Bassett CMA; Reviewed Date:   9/21/2020 12:58 PM CDT      Glutens  Estimated Onset Date:   Unspecified ; Created By:   Tiffany Connolly; Reaction Status:   Active ; Category:   Food ; Substance:   Glutens ; Type:   Allergy ; Updated By:   Tiffany Connolly; Reviewed Date:   9/21/2020 12:58 PM CDT      Milk Products  Estimated Onset Date:   Unspecified ; Created By:   Tiffany Connolly; Reaction Status:   Active ; Category:   Food ; Substance:   Milk Products ; Type:   Allergy ; Updated By:   Tiffany Connolly; Reviewed Date:   9/21/2020 12:58 PM CDT        Medication List   (As Of: 9/21/2020 12:58:40 PM CDT)   Prescription/Discharge Order    ALPRAZolam  :   ALPRAZolam ; Status:   Prescribed ; Ordered As Mnemonic:   ALPRAZolam 0.5 mg oral tablet ; Simple Display Line:   0.5 mg, 1 tab(s), Oral, q6 hrs, PRN: for anxiety, 60 tab(s), 0 Refill(s) ; Ordering Provider:   Gabriel Teixeira MD; Catalog Code:   ALPRAZolam ; Order Dt/Tm:   9/2/2020 11:55:56 AM CDT          amphetamine-dextroamphetamine  :    amphetamine-dextroamphetamine ; Status:   Prescribed ; Ordered As Mnemonic:   Adderall 10 mg oral tablet ; Simple Display Line:   10 mg, 1 tab(s), Oral, qpm, 30 tab(s), 0 Refill(s) ; Ordering Provider:   Gabriel Teixeira MD; Catalog Code:   amphetamine-dextroamphetamine ; Order Dt/Tm:   9/21/2020 12:47:29 PM CDT          amphetamine-dextroamphetamine  :   amphetamine-dextroamphetamine ; Status:   Completed ; Ordered As Mnemonic:   Adderall 10 mg oral tablet ; Simple Display Line:   10 mg, 1 tab(s), Oral, qpm, 7 tab(s), 0 Refill(s) ; Ordering Provider:   Gabriel Teixeira MD; Catalog Code:   amphetamine-dextroamphetamine ; Order Dt/Tm:   9/18/2020 3:02:33 PM CDT          amphetamine-dextroamphetamine  :   amphetamine-dextroamphetamine ; Status:   Prescribed ; Ordered As Mnemonic:   Adderall XR 30 mg oral capsule, extended release ; Simple Display Line:   30 mg, 1 cap(s), Oral, qam, 30 cap(s), 0 Refill(s) ; Ordering Provider:   Gabriel Teixeira MD; Catalog Code:   amphetamine-dextroamphetamine ; Order Dt/Tm:   9/21/2020 12:47:12 PM CDT          amphetamine-dextroamphetamine  :   amphetamine-dextroamphetamine ; Status:   Completed ; Ordered As Mnemonic:   Adderall XR 30 mg oral capsule, extended release ; Simple Display Line:   30 mg, 1 cap(s), Oral, qam, 7 cap(s), 0 Refill(s) ; Ordering Provider:   Gabriel Teixeira MD; Catalog Code:   amphetamine-dextroamphetamine ; Order Dt/Tm:   9/18/2020 3:02:14 PM CDT          busPIRone  :   busPIRone ; Status:   Prescribed ; Ordered As Mnemonic:   busPIRone 10 mg oral tablet ; Simple Display Line:   10 mg, 1 tab(s), Oral, tid, 90 tab(s), 1 Refill(s) ; Ordering Provider:   Gabriel Teixeira MD; Catalog Code:   busPIRone ; Order Dt/Tm:   8/10/2020 8:41:02 AM CDT          zolpidem  :   zolpidem ; Status:   Prescribed ; Ordered As Mnemonic:   zolpidem 5 mg oral tablet ; Simple Display Line:   1 tab(s), Oral, qhs, PRN: AS NEEDED FOR SLEEP, 7 tab(s), 0 Refill(s) ; Ordering Provider:   Adrianna  Brandan DURHAM; Catalog Code:   zolpidem ; Order Dt/Tm:   9/18/2020 3:01:06 PM CDT            ID Risk Screen   Recent Travel History :   No recent travel   Family Member Travel History :   No recent travel   Other Exposure to Infectious Disease :   Unknown   Lali Bassett CMA - 9/21/2020 12:57 PM CDT

## 2022-02-16 NOTE — TELEPHONE ENCOUNTER
---------------------  From: Lali Bassett CMA (eRx Pool ([id number]))   To: JACKIE SALMON    Sent: 1/21/2021 2:58:00 PM CST  Subject: RE: Prescription renewal request     Jackie Barnes refills have been sent to the pharmacy.    ROB Saha with Dr Teixeira      ---------------------  From: JACKIE SALMON  To: [facility]  Sent: 01/20/2021 05:47 p.m. CST  Subject: Prescription renewal request  JACKIE SALMON is requesting renewal of the prescription(s) below and has submitted the following details:  Prescription(s) to be renewed  Medication: Adderall XR 30 mg oral capsule, extended release  Dose: 1 cap(s)  Frequency: every morning  Rx date: 12/21/2020  Prescribed by: Gabriel Teixeira MD  Reason for renewal:   Quantity:   Medication: Adderall 10 mg oral tablet  Dose: 1 tab(s)  Frequency: once a day (in the evening)  Rx date: 12/21/2020  Prescribed by: Gabriel Teixeira MD  Reason for renewal:   Quantity:   Medication: ALPRAZolam 0.5 mg oral tablet  Dose: 1 tab(s)  Frequency: every 6 hours as needed  Rx date: 12/15/2020  Prescribed by: Gabriel Teixeira MD  Reason for renewal:   Quantity:   Delivery option  [place] [address]  Contact Information  Home Phone: [phone]  Mobile Phone: [phone]

## 2022-02-16 NOTE — NURSING NOTE
Comprehensive Intake Entered On:  10/9/2021 10:52 AM CDT    Performed On:  10/9/2021 10:45 AM CDT by Marilin Baeza CMA               Summary   Chief Complaint :   c/o panic attack, is taking Alprazolam 4 times a day instead of 2 times a day, needing enough medication until refill on 10/18/21     Weight Measured :   124.9 lb(Converted to: 124 lb 14 oz, 56.654 kg)    Height Measured :   66 in(Converted to: 5 ft 6 in, 167.64 cm)    Body Mass Index :   20.16 kg/m2   Body Surface Area :   1.62 m2   Systolic Blood Pressure :   132 mmHg (HI)    Diastolic Blood Pressure :   84 mmHg (HI)    Mean Arterial Pressure :   100 mmHg   Peripheral Pulse Rate :   88 bpm   BP Site :   Right arm   Pulse Site :   Radial artery   BP Method :   Manual   HR Method :   Manual   Temperature Tympanic :   97.8 DegF(Converted to: 36.6 DegC)  (LOW)    Marilin Baeza CMA - 10/9/2021 10:45 AM CDT   Health Status   Allergies Verified? :   Yes   Medication History Verified? :   Yes   Medical History Verified? :   No   Pre-Visit Planning Status :   Not completed   Tobacco Use? :   Current every day smoker   Marilin Baeza CMA - 10/9/2021 10:45 AM CDT   Consents   Consent for Immunization Exchange :   Consent Granted   Consent for Immunizations to Providers :   Consent Granted   Marilin Bazea CMA - 10/9/2021 10:45 AM CDT   Meds / Allergies   (As Of: 10/9/2021 10:52:28 AM CDT)   Allergies (Active)   Concerta  Estimated Onset Date:   Unspecified ; Created By:   Lali Bassett CMA; Reaction Status:   Active ; Category:   Drug ; Substance:   Concerta ; Type:   Allergy ; Updated By:   Lali Bassett CMA; Reviewed Date:   10/9/2021 10:50 AM CDT      Glutens  Estimated Onset Date:   Unspecified ; Created By:   Tiffany Connolly; Reaction Status:   Active ; Category:   Food ; Substance:   Glutens ; Type:   Allergy ; Updated By:   Tiffany Connolly; Reviewed Date:   10/9/2021 10:50 AM CDT      Milk Products  Estimated Onset Date:   Unspecified ; Created By:   Cathleen  Tiffany; Reaction Status:   Active ; Category:   Food ; Substance:   Milk Products ; Type:   Allergy ; Updated By:   Tiffany Connolly; Reviewed Date:   10/9/2021 10:50 AM CDT        Medication List   (As Of: 10/9/2021 10:52:29 AM CDT)   Prescription/Discharge Order    ALPRAZolam  :   ALPRAZolam ; Status:   Prescribed ; Ordered As Mnemonic:   ALPRAZolam 0.5 mg oral tablet ; Simple Display Line:   See Instructions, 1 tab(s) Oral qam and in afternoon, 2 tabs qhs  fill on or after 10/8/2021, PRN: for anxiety, 120 EA, 2 Refill(s) ; Ordering Provider:   Gabriel Abarca PA-C; Catalog Code:   ALPRAZolam ; Order Dt/Tm:   9/23/2021 4:55:59 PM CDT          amphetamine-dextroamphetamine  :   amphetamine-dextroamphetamine ; Status:   Prescribed ; Ordered As Mnemonic:   Adderall 30 mg oral tablet ; Simple Display Line:   30 mg, 1 tab(s), Oral, bid, fill on or after 10/8/2021, 60 tab(s), 0 Refill(s) ; Ordering Provider:   Gabriel Abarca PA-C; Catalog Code:   amphetamine-dextroamphetamine ; Order Dt/Tm:   9/23/2021 4:58:20 PM CDT          amphetamine-dextroamphetamine  :   amphetamine-dextroamphetamine ; Status:   Prescribed ; Ordered As Mnemonic:   Adderall 30 mg oral tablet ; Simple Display Line:   30 mg, 1 tab(s), Oral, bid, fill on or after 11/6/2021, 60 tab(s), 0 Refill(s) ; Ordering Provider:   Gbariel Abarca PA-C; Catalog Code:   amphetamine-dextroamphetamine ; Order Dt/Tm:   9/23/2021 4:58:44 PM CDT          SUMAtriptan  :   SUMAtriptan ; Status:   Prescribed ; Ordered As Mnemonic:   Imitrex 50 mg oral tablet ; Simple Display Line:   50 mg, 1 tab(s), Oral, once, may repeat dose in 2 hours if needed, PRN: for migraine headache, 9 tab(s), 1 Refill(s) ; Ordering Provider:   Gabriel Abarca PA-C; Catalog Code:   SUMAtriptan ; Order Dt/Tm:   8/11/2021 6:07:06 PM CDT            Social History   Social History   (As Of: 10/9/2021 10:52:29 AM CDT)   Alcohol:        Past   (Last Updated: 10/11/2018 3:46:51 PM CDT by Tevin GOEL, Gabriel GARCIA)    "       Tobacco:        4 or less cigarettes(less than 1/4 pack)/day in last 30 days   Comments:  6/22/2021 7:54 AM - Jose Daniel Plunkett CMA: Pt states she is \"down to 2 cigarettes a day\"   (Last Updated: 6/22/2021 7:54:08 AM CDT by Jose Daniel Plunkett CMA)          Electronic Cigarette/Vaping:        Electronic Cigarette Use: Never.   (Last Updated: 3/23/2021 4:11:19 PM CDT by Jose Daniel Plunkett CMA)          Substance Abuse:        Never   (Last Updated: 10/11/2018 3:45:06 PM CDT by Gabriel Abarca PA-C)          Employment/School:        Employed, Work/School description: dental assistant, small business owner.   (Last Updated: 10/11/2018 3:46:38 PM CDT by Gabriel Abarca PA-C)          Home/Environment:        Marital status: .  Spouse/Partner name: Jose Miguel.  Lives with Self, Children.  1 children.   (Last Updated: 10/11/2018 3:45:57 PM CDT by Gabriel Abarca PA-C)          Nutrition/Health:        Diet restrictions: dairy.   (Last Updated: 10/16/2018 2:34:54 PM CDT by Ann Llanes)          Exercise:        Exercise frequency: 3-4 times/week.   (Last Updated: 10/11/2018 3:45:19 PM CDT by Gabriel Abarca PA-C)  "

## 2022-02-16 NOTE — NURSING NOTE
Depression Screening Entered On:  9/23/2021 5:12 PM CDT    Performed On:  9/23/2021 5:11 PM CDT by Jose Daniel Plunkett CMA               Depression Juaquin   Little Interest - Pleasure in Activities :   Not at all   Feeling Down, Depressed, Hopeless :   Not at all   Initial Depression Screen Score :   0 Score   Poor Appetite or Overeating :   Not at all   Trouble Falling or Staying Asleep :   Several days   Feeling Tired or Little Energy :   Not at all   Feeling Bad About Yourself :   Not at all   Trouble Concentrating :   Not at all   Moving or Speaking Slowly :   Not at all   Thoughts Better Off Dead or Hurting Self :   Not at all   Jose Daniel Plunkett CMA - 9/23/2021 5:11 PM CDT   Difficulty at Work, Home, Getting Along :   Somewhat difficult   Jose Daniel Plunkett CMA - 9/23/2021 5:14 PM CDT     Detailed Depression Screen Score :   1    Total Depression Screen Score :   1    Jose Daniel Plunkett CMA 9/23/2021 5:11 PM CDT

## 2022-02-16 NOTE — TELEPHONE ENCOUNTER
---------------------  From: Llai Bassett CMA   To: Gabriel Teixeira MD;     Sent: 2019 8:16:59 AM CST  Subject: refill request-ambien     PCP:   ARACELIS    Medication:   ambien  Last Filled:  _    Quantity:  30  Refills:  _      Date of last office visit and reason:   18      Note:  Pt called requesting refill    Pharmacy: [facility]    Resource:   Jackie  Phone:   [phone]---------------------  From: Gabriel Teixeira MD   To: Lali Bassett CMA;     Sent: 2019 11:44:26 AM CST  Subject: RE: refill request-ambien     Not due until 2019.---------------------  From: Lali Bassett CMA   To: Gabriel Teixeira MD;     Sent: 1/15/2019 1:10:07 PM CST  Subject: RE: refill request-ambien     Pt says that it was due the ... she hasn't had it filled for quite some time---------------------  From: Gabriel Teixeira MD   To: Lali Bassett CMA;     Sent: 1/15/2019 3:36:07 PM CST  Subject: RE: refill request-ambien     she right.  Rx sent to .Pt contacted

## 2022-02-16 NOTE — TELEPHONE ENCOUNTER
---------------------  From: Jenae Brown CMA (Phone Messages Pool ([id number]_[facility]))   To: [facility] ([id number])   ;     Sent: 12/21/2020 9:07:23 AM CST  Subject: FW: Medication refill     LV: 9/21/20 med check  RTC: 4 months ( due 1/2021)  Both where last filled 11/24/20 for 30 day supplies with 0 refills      ---------------------  From: JACKIE SALMON  To: [facility]  Sent: 12/21/2020 07:40 a.m. CST  Subject: Medication refill  Could I get refills on adderal 30mg and 10 mg? The portal isnt working on the med. Refill page so I sent it this way.  Thanks,  Jackie salmon---------------------  From: Lali Bassett CMA ([facility] Message Pool ([id number])   )   To: Gabriel Teixeira MD;     Sent: 12/21/2020 9:10:37 AM CST  Subject: FW: Medication refillleft message on secure voicemail that refills went to her pharmacy

## 2022-02-16 NOTE — PROGRESS NOTES
Patient:   HEATHER SALMON            MRN: [id number]            FIN: [id number]               Age:   39 years     Sex:  Female     :  1980   Associated Diagnoses:   ADHD   Author:   Gabriel Teixeira MD      Impression and Plan   Diagnosis     ADHD (COL19-VL F90.9).     Course:  Progressing as expected.    Orders     Orders   Charges (Evaluation and Management):  59076 office outpatient visit 15 minutes (Charge) (Order): Quantity: 1, ADHD.     Orders (Selected)   Prescriptions  Prescribed  Adderall 10 mg oral tablet: = 1 tab(s) ( 10 mg ), Oral, qpm, # 30 tab(s), 0 Refill(s), Type: Maintenance  Adderall XR 30 mg oral capsule, extended release: = 1 cap(s) ( 30 mg ), Oral, qam, # 30 cap(s), 0 Refill(s), Type: Maintenance.        Visit Information      Date of Service: 2019 01:06 pm  Performing Location: [facility]  Encounter#: [id number]      Primary Care Provider (PCP):  NONE ,       Referring Provider:  Gabriel Teixeira MD    NPI# [id number]   Visit type:  Scheduled follow-up.    Accompanied by:  No one.    Source of history:  Self.    Referral source:  Self.    History limitation:  None.       Chief Complaint   2019 1:09 PM CDT    Pt here for med ck        Interval History   Patient continues to use Adderall for Adult ADD.  She feels it works well for her and does not complain of any side effects.  She decides not to take any medication for anxiety and is coping OK without it.      Review of Systems   Constitutional:  Negative.    Eye:  Negative.    Ear/Nose/Mouth/Throat:  Negative.    Respiratory:  Negative.    Cardiovascular:  Negative.    Gastrointestinal:  Negative.    Genitourinary:  Negative.    Hematology/Lymphatics:  Negative.    Endocrine:  Negative.    Immunologic:  Negative.    Musculoskeletal:  Negative.    Integumentary:  Negative.    Neurologic:  Negative.    Psychiatric:  Anxiety.    All other systems reviewed and negative      Health Status   Allergies:     Allergic Reactions (Selected)  Severity Not Documented  Concerta (No reactions were documented)  Glutens (No reactions were documented)  Milk Products (No reactions were documented)   Medications:  (Selected)   Prescriptions  Prescribed  Adderall 10 mg oral tablet: = 1 tab(s) ( 10 mg ), Oral, qpm, # 30 tab(s), 0 Refill(s), Type: Maintenance  Adderall XR 30 mg oral capsule, extended release: = 1 cap(s) ( 30 mg ), Oral, qam, # 30 cap(s), 0 Refill(s), Type: Maintenance  zolpidem 5 mg oral tablet: = 1 tab(s) ( 5 mg ), PO, Once a day (at bedtime), PRN: for sleep, # 30 tab(s), 0 Refill(s), Type: Maintenance, Pharmacy: [facility], 1 tab(s) Oral hs,PRN:for sleep   Problem list:    All Problems  ADHD / SNOMED CT 2666204487 / Confirmed  ADHD - Attention deficit disorder with hyperactivity / SNOMED CT 3882495245 / Confirmed  JULITO - Generalized anxiety disorder / SNOMED CT 843170620 / Confirmed  Generalized anxiety disorder / SNOMED CT 88435586 / Confirmed  Hip pain, bilateral / SNOMED CT 31398578 / Confirmed  Insomnia / SNOMED CT 494126568 / Confirmed  Low back pain / SNOMED CT 875444357 / Confirmed      Histories   Past Medical History:    Active  ADHD - Attention deficit disorder with hyperactivity (5484170950)  JULITO - Generalized anxiety disorder (891248940)  Low back pain (149560427)  Hip pain, bilateral (11937698)   Family History:    Attention deficit disorder  Brother  Anxiety  Father  Hypertension  Father  Heart disease  Grandfather (M)  Pancreatic cancer  Grandfather (M)  Migraine  Mother  Lyme disease  Sister  Anxiety  Father  Colon cancer  Grandmother (P) ()  Comments:  10/29/2018 12:11 PM Tiffany Hawthorne  70s  ADHD (attention deficit hyperactivity disorder)....  Brother  Insomnia  Mother     Procedure history:    Cyst of breast (SNOMED CT 6701874064) on 2014 at 34 Years.  Comments:  10/29/2018 12:08 PM Tiffany Hawthorne  Right  Biopsy (SNOMED CT 985817333) on 2014 at 34  Years.  Comments:  10/29/2018 11:54 AM CDT - Tiffany Connolly  Benign fibroadipose tissue.    10/29/2018 11:52 AM [facility] - Tiffany Connolly  Needle biopsy of right breast.  Caesarean section (SNOMED CT 00516076).  Tubal ligation (SNOMED CT 374686616).  Tooth extraction, multiple (SNOMED CT 19297788).   Social History:        Alcohol Assessment            Past      Tobacco Assessment            Former smoker, quit more than 30 days ago                     Comments:                      10/11/2018 - Tevin GOEL, Gabriel GARCIA                     quit 2010      Substance Abuse Assessment            Never      Employment and Education Assessment            Employed, Work/School description: dental assistant, small business owner.      Home and Environment Assessment            Marital status: .  Spouse/Partner name: Jose Miguel.  Lives with Self, Children.  1 children.      Nutrition and Health Assessment            Diet restrictions: dairy.      Exercise and Physical Activity Assessment            Exercise frequency: 3-4 times/week.      Physical Examination   Vital Signs   7/29/2019 1:09 PM CDT Peripheral Pulse Rate 83 bpm    Systolic Blood Pressure 124 mmHg    Diastolic Blood Pressure 78 mmHg    Mean Arterial Pressure 93 mmHg    Oxygen Saturation 99 %      Measurements from flowsheet : Measurements   7/29/2019 1:09 PM CDT Height Measured - Standard 66 in    Weight Measured - Standard 172 lb    BSA 1.9 m2    Body Mass Index 27.76 kg/m2  HI      General:  No acute distress.    Neck:  Supple, No lymphadenopathy, No thyromegaly.    Respiratory:  Lungs are clear to auscultation, Respirations are non-labored, Breath sounds are equal, Symmetrical chest wall expansion.    Cardiovascular:  Normal rate, Regular rhythm, No murmur, No gallop, Good pulses equal in all extremities, Normal peripheral perfusion, No edema.    Gastrointestinal:  Soft, Non-tender, Non-distended, Normal bowel sounds, No organomegaly.    Integumentary:  Warm, Dry, Pink.     Neurologic:  Alert, Oriented.    Psychiatric:  Cooperative, Appropriate mood & affect, Non-suicidal.         Mood and affect: Calm.         Behavior: Relaxed.         Judgment: Able to make sensible decisions, Appropriate in social situations.         Thought process: Appropriate.

## 2022-02-16 NOTE — NURSING NOTE
Generalized Anxiety Disorder Screening Entered On:  3/23/2021 5:38 PM CDT    Performed On:  3/23/2021 5:37 PM CDT by Jose Daniel Plunkett CMA               Generalized Anxiety Disorder Screening   JULITO Nervous, Anxious On Edge :   More than half the days   JULITO Control Worrying B :   More than half the days   JULITO Worrying Too Much :   More than half the days   JULITO Trouble Relaxing :   More than half the days   JULITO Restless :   More than half the days   JULITO Easily Annoyed/Irritable :   Not at all   JULITO Afraid :   Several days   JULITO Total Screening Score :   11    JULITO Difficulty with Work, Home, Others :   Somewhat difficult   Jose Daniel Plunkett CMA - 3/23/2021 5:37 PM CDT

## 2022-02-16 NOTE — NURSING NOTE
CAGE Assessment Entered On:  4/16/2019 10:06 AM CDT    Performed On:  4/16/2019 10:06 AM CDT by Lali Bassett CMA               Assessment   Have you ever felt you should cut down on your drinking :   No   Have people annoyed you by criticizing your drinking :   No   Have you ever felt bad or guilty about your drinking :   No   Have you ever taken a drink first thing in the morning to steady your nerves or get rid of a hangover (Eye-opener) :   No   CAGE Score :   0    Lali Bassett CMA - 4/16/2019 10:06 AM CDT

## 2022-02-16 NOTE — TELEPHONE ENCOUNTER
---------------------  From: Marli Abebe CMA (eRx Pool (26324_[facility]))   To: ARACELIS Message [employer] ([address]124_[facility])   ;     Sent: 2/22/2021 2:45:46 PM CST  Subject: FW: Prescription renewal request     Last visit 9/21/20  RTC overdue     alprazolam last filled 1/21/21 #60, 0  Adderall 10mg last filled 1/21/20 #30, 0 refills  Adderall 30mg last filled 1/21/20 #30, 0 refills  zolpidem last filled 11/24/20 #30, 0 refills    ---------------------  From: HEATHER SALMON  To: [facility]  Sent: 02/22/2021 06:18 a.m. CST  Subject: Prescription renewal request  HEATHER SALMON is requesting renewal of the prescription(s) below and has submitted the following details:  Prescription(s) to be renewed  Medication: ALPRAZolam 0.5 mg oral tablet  Dose: 1 tab(s)  Frequency: every 6 hours as needed  Rx date: 01/21/2021  Prescribed by: Gabriel Teixeira MD  Reason for renewal:   Quantity:   Medication: Adderall 10 mg oral tablet  Dose: 1 tab(s)  Frequency: once a day (in the evening)  Rx date: 01/21/2021  Prescribed by: Gabriel Teixeira MD  Reason for renewal:   Quantity:   Medication: Adderall XR 30 mg oral capsule, extended release  Dose: 1 cap(s)  Frequency: every morning  Rx date: 01/21/2021  Prescribed by: Gabriel Teixeira MD  Reason for renewal:   Quantity:   Medication: zolpidem 5 mg oral tablet  Dose: 1 tab(s)  Frequency: at bedtime as needed  Rx date: 11/24/2020  Prescribed by: Gabriel Teixeira MD  Reason for renewal:   Quantity:   Delivery option  [place] [address]  Phone: [phone]  Contact Information  By Secure Message---------------------  From: Lali Bassett CMA ([facility] ([address]_[facility])   )   To: Gabriel Teixeira MD;     Sent: 2/22/2021 2:47:43 PM CST  Subject: FW: Prescription renewal request

## 2022-02-16 NOTE — PROGRESS NOTES
Patient:   HEATHER SALMON            MRN: [id number]            FIN: [id number]               Age:   41 years     Sex:  Female     :  1980   Associated Diagnoses:   ADHD; Generalized anxiety disorder   Author:   Tevin GOEL, Gabriel GARCIA      Visit Information      Date of Service: 2021 06:33 am  Performing Location: [facility]  Encounter#: [id number]      Primary Care Provider (PCP):  NONE ,    Visit type:  Video Visit via [employer] or [place].    Participants in room during visit:  _1   Location of patient:  _home  Location of provider:  _ (Clinic office   Video Start Time:  _821  Video End Time:   _30  Today's visit was conducted via video conference due to the COVID-19 pandemic.  The patient's consent to proceed with a video visit has been obtained and documented.      Chief Complaint   2021 7:49 AM CDT    Verbal consent given for a video visit. Pt is needing refills on here ADHD and anxiety medications medications.  Pt states since rash has gone away since stopping adderal 30 mg XR.        History of Present Illness   Patient is a _41 year old _female who is being evaluated via a billable video visit.  last week she had a possible skin reaction to Adderall XR, the rash resolved after stopping the Adderall XR but she continued  with the immediate release Adderall    she stopped buspirone because it was not effective  and she stopped zolpidem because it gave her weird dreams    PHQ-9 is 4, JULITO-7 is 7           Review of Systems   Constitutional:  Negative.    Ear/Nose/Mouth/Throat:  Negative.    Respiratory:  Negative.    Gastrointestinal:  Negative.       Health Status   Allergies:    Allergic Reactions (Selected)  Severity Not Documented  Concerta (No reactions were documented)  Glutens (No reactions were documented)  Milk Products (No reactions were documented)   Medications:  (Selected)   Prescriptions  Prescribed  ALPRAZolam 0.5 mg oral tablet: = 1 tab(s) ( 0.5 mg ),  Oral, q6 hrs, PRN: for anxiety, # 60 tab(s), 0 Refill(s), Type: Soft Stop, Pharmacy: [facility], 1 tab(s) Oral q6 hrs,PRN:for anxiety, 66, in, 03/23/21 16:10:00 CDT, Height Measured, 137, lb, 03/23/21 16:10:00 CDT, Weigh...  Adderall 10 mg oral tablet: = 1 tab(s) ( 10 mg ), Oral, qhs, # 30 tab(s), 0 Refill(s), Type: Maintenance, Pharmacy: [facility], 1 tab(s) Oral qhs, 66, in, 03/23/21 16:10:00 CDT, Height Measured, 137, lb, 03/23/21 16:10:00 CDT, Weight Measured  Adderall 10 mg oral tablet: = 1 tab(s) ( 10 mg ), Oral, qpm, Instructions: fill on or after 6/15/2021, # 30 tab(s), 0 Refill(s), Type: Maintenance, Pharmacy: [facility], 1 tab(s) Oral qpm,Instr:fill on or after 6/15/2021, 66, in, 03/23/21 16:10:00 CDT, Height Measured, 1...  Adderall XR 30 mg oral capsule, extended release: = 1 cap(s) ( 30 mg ), Oral, qam, Instructions: fill on or after 6/15/2021, # 30 cap(s), 0 Refill(s), Type: Maintenance, Pharmacy: [facility], 1 cap(s) Oral qam,Instr:fill on or after 6/15/2021, 66, in, 03/23/21 16:10:00 CDT, Height Measured, 1...   Problem list:    All Problems  Tobacco user / SNOMED CT 953870317 / Probable  ADHD / SNOMED CT 9383765554 / Confirmed  ADHD - Attention deficit disorder with hyperactivity / SNOMED CT 5286367307 / Confirmed  Insomnia / SNOMED CT 768383774 / Confirmed  Panic attacks / SNOMED CT 923323482 / Confirmed  Generalized anxiety disorder / SNOMED CT 26040232 / Confirmed  Low back pain / SNOMED CT 284561294 / Confirmed  JULITO - Generalized anxiety disorder / SNOMED CT 456560403 / Confirmed  Hip pain, bilateral / SNOMED CT 56013493 / Confirmed      Histories   Past Medical History:    Active  ADHD - Attention deficit disorder with hyperactivity (8290253727)  JULITO - Generalized anxiety disorder (966927314)  Low back pain (901616431)  Hip pain, bilateral (13076372)   Family History:    Attention deficit disorder  Brother  Anxiety  Father  Hypertension  Father  Heart  "disease  Grandfather (M)  Pancreatic cancer  Grandfather (M)  Migraine  Mother  Lyme disease  Sister  Anxiety  Father  Colon cancer  Grandmother (P) ()  Comments:  10/29/2018 12:11 PM Tiffany Hawthorne  70s  ADHD (attention deficit hyperactivity disorder)....  Brother  Insomnia  Mother     Procedure history:    Cyst of breast (2464744382) on 2014 at 34 Years.  Comments:  10/29/2018 12:08 PM Tiffany Hawthorne  Right  Biopsy (582015438) on 2014 at 34 Years.  Comments:  10/29/2018 11:54 AM Tiffany Hawthorne  Benign fibroadipose tissue.    10/29/2018 11:52 AM Tiffany Hawthorne  Needle biopsy of right breast.  Caesarean section (05820431).  Tubal ligation (957035730).  Tooth extraction, multiple (18845482).   Social History:        Electronic Cigarette/Vaping Assessment            Electronic Cigarette Use: Never.      Alcohol Assessment            Past      Tobacco Assessment            4 or less cigarettes(less than 1/4 pack)/day in last 30 days                     Comments:                      2021 - Jose Daniel Plunkett CMA                     Pt states she is \"down to 2 cigarettes a day\"      Substance Abuse Assessment            Never      Employment and Education Assessment            Employed, Work/School description: dental assistant, small business owner.      Home and Environment Assessment            Marital status: .  Spouse/Partner name: Jose Miguel.  Lives with Self, Children.  1 children.      Nutrition and Health Assessment            Diet restrictions: dairy.      Exercise and Physical Activity Assessment            Exercise frequency: 3-4 times/week.        Physical Examination   Measurements from flowsheet : Measurements   2021 7:49 AM CDT    Height Measured - Standard                66 in     General:  No acute distress.    Respiratory:  Respirations are non-labored.    Psychiatric:  Cooperative, Appropriate mood & affect, Normal judgment.       Impression and Plan   Diagnosis "     ADHD (GAZ66-ZW F90.9).     Generalized anxiety disorder (BGX06-TJ F41.1).     Summary:  will trial just immediate release Adderall 20 mg bid,  continue with alprazolam prn  stop the buspirone and zolpidem  Rx for Adderall given for 2 months, can call for 3rd and 4th  months, should be seen every 4 months  [place] PDMP consulted, no irregularities noted  .    Orders     Orders   Pharmacy:  Adderall 20 mg oral tablet (Prescribe): = 1 tab(s) ( 20 mg ), Oral, bid, Instructions: fill on or after 6/22/2021, # 60 tab(s), 0 Refill(s), Type: Maintenance, Pharmacy: [facility], 1 tab(s) Oral bid,Instr:fill on or after 6/22/2021, 66, in, 6/22/2021 7:49 AM CDT, Height Measured, 137, lb, 3/23/2021 4:10 PM CDT, Weight Measured  Adderall 10 mg oral tablet (Modify): = 1 tab(s) ( 10 mg ), Oral, qhs, # 30 tab(s), 0 Refill(s), Type: Hard Stop, Pharmacy: [facility], 66, in, 03/23/21 16:10:00 CDT, Height Measured, 137, lb, 03/23/21 16:10:00 CDT, Weight Measured.     Orders   Pharmacy:  ALPRAZolam 0.5 mg oral tablet (Prescribe): = 1 tab(s) ( 0.5 mg ), Oral, q6 hrs, PRN: for anxiety, # 60 tab(s), 2 Refill(s), Type: Soft Stop, Pharmacy: [facility], 1 tab(s) Oral q6 hrs,PRN:for anxiety, 66, in, 6/22/2021 7:49 AM CDT, Height Measured, 137, lb, 3/23/2021 4:10 PM CDT, Weight Measured  Adderall 20 mg oral tablet (Prescribe): = 1 tab(s) ( 20 mg ), Oral, bid, Instructions: fill on or after 7/20/2021, # 60 tab(s), 0 Refill(s), Type: Maintenance, Pharmacy: [facility], 1 tab(s) Oral bid,Instr:fill on or after 7/20/2021, 66, in, 6/22/2021 7:49 AM CDT, Height Measured, 137, lb, 3/23/2021 4:10 PM CDT, Weight Measured  ALPRAZolam 0.5 mg oral tablet (Modify): = 1 tab(s) ( 0.5 mg ), Oral, q6 hrs, PRN: for anxiety, # 60 tab(s), 0 Refill(s), Type: Hard Stop, Pharmacy: [facility], 66, in, 03/23/21 16:10:00 CDT, Height Measured, 137, lb, 03/23/21 16:10:00 CDT, Weight Measured  Adderall XR 30 mg oral capsule,  extended release (Complete)  Adderall 10 mg oral tablet (Complete).     Orders   Charges (Evaluation and Management):  10478 office o/p est mod 30-39 min (Charge) (Order): Quantity: 1, ADHD  Generalized anxiety disorder.

## 2022-02-16 NOTE — TELEPHONE ENCOUNTER
---------------------  From: Lali Bassett CMA (Phone Messages Pool (32224_[facility]))   To: [facility] Message Pool (32224_[facility]);     Sent: 6/17/2021 9:37:52 AM CDT  Subject: General Message     Phone Message    Note:   Pt called to say that she is needing the brand name of Adderall and this needs to be ok'd and sent by PCP then ins will cover it           Person Calling:  [place]  Phone number:  [phone] ok to leave message and/or to call her parents which she gives permission to speak with them         PCP:   Antoine?  Jose Daniel Plunkett CMA---------------------  From: Jose Daniel Plunkett CMA ([facility] Message Pool (32224_[facility]))   To: Gabriel Abarca PA-C;     Sent: 6/17/2021 11:01:58 AM CDT  Subject: FW: General Message---------------------  From: Gabriel Abarca PA-C   To: [facility] Message Pool (32224_[facility]);     Sent: 6/17/2021 11:04:39 AM CDT  Subject: RE: General Message     She should take the Adderall that she has now, and can then schedule a follow up visit and   we can discuss switching to name brand if needed (and there is no guarantee that insurance  will cover name brand)I left [facility] message on pt personal voicemail [phone].  Ok per pt request.  Jose Daniel Plunkett CMA

## 2022-02-16 NOTE — PROGRESS NOTES
Patient:   HEATHER SALMON            MRN: [id number]            FIN: [id number]               Age:   39 years     Sex:  Female     :  1980   Associated Diagnoses:   ADHD; Generalized anxiety disorder; Insomnia   Author:   Gabriel Teixeira MD      Impression and Plan   Diagnosis     ADHD (CUH14-JM F90.9).     Generalized anxiety disorder (TSW15-EJ F41.1).     Insomnia (QYQ73-RE G47.00).     Course:  Unchanged.    Orders     Orders   Charges (Evaluation and Management):  37897 physician telephone evaluation 11-20 min (Charge) (Order): Quantity: 1, Generalized anxiety disorder  ADHD  Insomnia.     Orders (Selected)   Prescriptions  Prescribed  Adderall 10 mg oral tablet: = 1 tab(s) ( 10 mg ), Oral, qpm, # 30 tab(s), 0 Refill(s), Type: Maintenance, Pharmacy: [facility], 1 tab(s) Oral qpm  Adderall XR 30 mg oral capsule, extended release: = 1 cap(s) ( 30 mg ), Oral, qam, # 30 cap(s), 0 Refill(s), Type: Maintenance, Pharmacy: [facility], 1 cap(s) Oral qam  zolpidem 5 mg oral tablet: = 1 tab(s), Oral, qhs, PRN: AS NEEDED FOR SLEEP, # 30 tab(s), 0 Refill(s), Type: Soft Stop, Pharmacy: [facility], 1 tab(s) Oral qhs,PRN:AS NEEDED FOR SLEEP.        Visit Information      Date of Service: 2020 10:58 am  Performing Location: [facility]  Encounter#: [id number]      Primary Care Provider (PCP):  Gabriel Teixeira MD    NPI# [id number]   Visit type:  Telephone Encounter.    Source of history:  Self.    Location of patient:  _home  Call Start Time:   _1420  Call End Time:    1440_   Referral source:  Self.    History limitation:  None.       Chief Complaint   Patient needs routine refill of regular medications   2020 10:42 AM CDT   verbal consent given for telephone visit for med refills     _      History of Present Illness   Today's visit was conducted via telephone due to the COVID-19 pandemic.     Reason for visit:  _Patient needs medications refilled.  No new problems  to report.      Review of Systems   Constitutional:  Negative.    Eye:  Negative.    Ear/Nose/Mouth/Throat:  Negative.    Respiratory:  Negative.    Cardiovascular:  Negative.    Gastrointestinal:  Negative.    Genitourinary:  Negative.    Hematology/Lymphatics:  Negative.    Endocrine:  Negative.    Immunologic:  Negative.    Musculoskeletal:  Negative.    Integumentary:  Negative.    Neurologic:  Negative.    Psychiatric:  Negative.    All other systems reviewed and negative      Health Status   Allergies:    Allergic Reactions (Selected)  Severity Not Documented  Concerta (No reactions were documented)  Glutens (No reactions were documented)  Milk Products (No reactions were documented)   Medications:  (Selected)   Prescriptions  Prescribed  ALPRAZolam 0.5 mg oral tablet: = 1 tab(s) ( 0.5 mg ), Oral, q6 hrs, PRN: for anxiety, # 30 tab(s), 0 Refill(s), Type: Soft Stop, Pharmacy: [facility], 1 tab(s) Oral q6 hrs,PRN:for anxiety  Adderall 10 mg oral tablet: = 1 tab(s) ( 10 mg ), Oral, qpm, # 30 tab(s), 0 Refill(s), Type: Maintenance, Pharmacy: [facility], 1 tab(s) Oral qpm  Adderall XR 30 mg oral capsule, extended release: = 1 cap(s) ( 30 mg ), Oral, qam, # 30 cap(s), 0 Refill(s), Type: Maintenance, Pharmacy: [facility], 1 cap(s) Oral qam  zolpidem 5 mg oral tablet: = 1 tab(s), Oral, qhs, PRN: AS NEEDED FOR SLEEP, # 30 tab(s), 0 Refill(s), Type: Soft Stop, Pharmacy: [facility], 1 tab(s) Oral qhs,PRN:AS NEEDED FOR SLEEP,    Medications          *denotes recorded medication          ALPRAZolam 0.5 mg oral tablet: 0.5 mg, 1 tab(s), Oral, q6 hrs, PRN: for anxiety, 30 tab(s), 0 Refill(s).          Adderall XR 30 mg oral capsule, extended release: 30 mg, 1 cap(s), Oral, qam, 30 cap(s), 0 Refill(s).          Adderall 10 mg oral tablet: 10 mg, 1 tab(s), Oral, qpm, 30 tab(s), 0 Refill(s).          zolpidem 5 mg oral tablet: 1 tab(s), Oral, qhs, PRN: AS NEEDED FOR SLEEP, 30 tab(s), 0  Refill(s).       Problem list:    All Problems  ADHD / SNOMED CT 2979023567 / Confirmed  ADHD - Attention deficit disorder with hyperactivity / SNOMED CT 9942974724 / Confirmed  JULITO - Generalized anxiety disorder / SNOMED CT 215401346 / Confirmed  Generalized anxiety disorder / SNOMED CT 17031532 / Confirmed  Hip pain, bilateral / SNOMED CT 36568402 / Confirmed  Insomnia / SNOMED CT 185813601 / Confirmed  Low back pain / SNOMED CT 119837031 / Confirmed  Panic attacks / SNOMED CT 236020119 / Confirmed      Histories   Past Medical History:    Active  ADHD - Attention deficit disorder with hyperactivity (2085033054)  JULITO - Generalized anxiety disorder (868629053)  Low back pain (309602552)  Hip pain, bilateral (40292299)   Family History:    Attention deficit disorder  Brother  Anxiety  Father  Hypertension  Father  Heart disease  Grandfather (M)  Pancreatic cancer  Grandfather (M)  Migraine  Mother  Lyme disease  Sister  Anxiety  Father  Colon cancer  Grandmother (P) ()  Comments:  10/29/2018 12:11 PM Tiffany Hawthorne  70s  ADHD (attention deficit hyperactivity disorder)....  Brother  Insomnia  Mother     Procedure history:    Cyst of breast (SNOMED CT 0229920367) on 2014 at 34 Years.  Comments:  10/29/2018 12:08 PM Tiffany Hawthorne  Right  Biopsy (SNOMED CT 705001290) on 2014 at 34 Years.  Comments:  10/29/2018 11:54 AM Tiffany Hawthorne  Benign fibroadipose tissue.    10/29/2018 11:52 AM Tiffany Hawthorne  Needle biopsy of right breast.  Caesarean section (SNOMED CT 84336147).  Tubal ligation (SNOMED CT 612310638).  Tooth extraction, multiple (SNOMED CT 59652631).   Social History:        Alcohol Assessment            Past      Tobacco Assessment            Former smoker, quit more than 30 days ago                     Comments:                      10/11/2018 - Tevin GOEL, Gabriel GARCIA                     quit 2010      Substance Abuse Assessment            Never      Employment and Education  Assessment            Employed, Work/School description: dental assistant, small business owner.      Home and Environment Assessment            Marital status: .  Spouse/Partner name: Jose Miguel.  Lives with Self, Children.  1 children.      Nutrition and Health Assessment            Diet restrictions: dairy.      Exercise and Physical Activity Assessment            Exercise frequency: 3-4 times/week.        Physical Examination   General:  Alert and oriented, No acute distress.    Respiratory:  Respirations are non-labored.    Psychiatric:  Cooperative, Appropriate mood & affect, Normal judgment.

## 2022-02-16 NOTE — TELEPHONE ENCOUNTER
---------------------  From: Dustin Peralta (Phone Messages Pool ([address] [place] [address]))   To: Advanced Practice Provider [facility] ([address] [place] [address]);     Sent: 2020 4:04:58 PM CDT  Subject: Rx refill      Medication Refill needing approval    PCP:   ARACELIS (OC)     Medication:   Adderall   Last Filled:  2020                 Quantity:  30                  Refills:  1    Medication:   Zolpidem  Last Filled:  2020                 Quantity:  30                  Refills:  0  CSA on file?   No, its .    Date of last office visit and reason: 2020; Insomnia, anxiety.     Date of last labs pertaining to condition:  n/a     Note:  Please advise on refill.     Return to Clinic order placed?  Yes, Due 2020 for med check    Resource:   Pt   Phone:   [phone]; ok to leave detailed message---------------------  From: Choco GOEL, Zoie DAHL (Advanced Practice Provider Pool ([address] [place] [address]))   To: Phone [employer] Pool ([address] [place] [address]); Gabriel Teixeira MD;     Sent: 2020 9:09:12 PM CDT  Subject: RE: Rx refill      Dr. Teixeira is telemedicine , can fill then.---------------------  From: Jennifer Phelps LPN (Phone Messages Pool ([address] [place] [address]))   To: ARACELIS Message Pool ([address] [place] [address])   ;     Sent: 2020 8:01:28 AM CDT  Subject: FW: Rx refill---------------------  From: Isabel Bonds CMA (ARACELIS Message Pool ([address] [place] [address])   )   To: Gabriel Teixeira MD;     Sent: 2020 10:11:47 AM CDT  Subject: FW: Rx refill

## 2022-02-16 NOTE — NURSING NOTE
CAGE Assessment Entered On:  3/23/2021 5:37 PM CDT    Performed On:  3/23/2021 5:37 PM CDT by Jose Daniel Plunkett CMA               Assessment   Have you ever felt you should cut down on your drinking :   No   Have people annoyed you by criticizing your drinking :   No   Have you ever felt bad or guilty about your drinking :   No   Have you ever taken a drink first thing in the morning to steady your nerves or get rid of a hangover (Eye-opener) :   No   CAGE Score :   0    Jose Daniel Plunkett CMA - 3/23/2021 5:37 PM CDT

## 2022-02-16 NOTE — TELEPHONE ENCOUNTER
---------------------  From: Lali Bassett CMA   To: Gabriel Teixeira MD;     Sent: 3/27/2019 4:35:28 PM CDT  Subject: refill request-adderall 10 mg     PCP:   ARACELIS    Medication:   adderall 10 mg  Last Filled:  19    Quantity:  30  Refills:  0      Date of last office visit and reason:   18      Note:  _    Pharmacy:     Resource:   _  Phone:   [phone]  ** Submitted: **  Order:amphetamine-dextroamphetamine (Adderall 10 mg oral tablet)  1 tab(s)  Oral  qpm  Qty:  30 tab(s)        Refills:  0          Substitutions Allowed     Route To Pharmacy - [facility]    Signed by Gabriel Teixeira MD  3/28/2019 12:17:00 PM

## 2022-02-16 NOTE — TELEPHONE ENCOUNTER
---------------------  From: Lali Bassett CMA (Phone Messages Pool (88424_[facility]))   To: [email or web address] Message Pool ([address]_[facility]);     Sent: 7/7/2021 8:06:28 AM CDT  Subject: FW: tobacco use...           ---------------------  From: JACKIE SALMON  To: [facility]  Sent: 07/06/2021 07:31 p.m. CDT  Subject: tobacco use...  I also forgot to mention that I am down to about 1 cig. a day and this is why I need the anxiety med.  Thanks again for everything..  Jackie Gould ([facility])---------------------  From: Jose Daniel Plunkett CMA ([email or web address] Message Pool (32[address]_[facility]))   To: Gabriel Abarca PA-C;     Sent: 7/7/2021 11:40:17 AM CDT  Subject: FW: tobacco use...

## 2022-02-16 NOTE — TELEPHONE ENCOUNTER
---------------------  From: Lali Bassett CMA   To: Gabriel Teixeira MD;     Sent: 2/18/2020 1:59:52 PM CST  Subject: refill request-alprazolam     PCP:   ARACELIS    Medication:   alprazolam  Last Filled:  1/30/20    Quantity:  _  Refills:  _      Date of last office visit and reason:   1/30/20      Note:  _    Pharmacy: _    Resource:   Jackie  Phone:   [phone]

## 2022-02-16 NOTE — TELEPHONE ENCOUNTER
---------------------  From: Lali Bassett CMA   To: Gabriel Teixeira MD;     Sent: 2019 11:26:44 AM CST  Subject: refill request-alprazolam     PCP:   ARACELIS    Medication:   alprazolam  Last Filled:  19    Quantity:  30  Refills:  0      Date of last office visit and reason:   19      Note:  Pt requesting refill    Pharmacy: [facility]    Resource:   Jackie  Phone:   [phone]  ** Submitted: **  Order:ALPRAZolam (ALPRAZolam 0.5 mg oral tablet)  1 tab(s)  Oral  q6 hrs  Qty:  30 tab(s)        Refills:  0          Substitutions Allowed     PRN  for anxiety      Route To Pharmacy - [facility]    Signed by Gabriel Teixeira MD  2019 9:13:00 AM

## 2022-02-16 NOTE — TELEPHONE ENCOUNTER
---------------------  From: Lali Bassett CMA (eRx Pool (32224_[facility]))   To: Gabriel Teixeira MD;     Sent: 8/17/2020 4:07:56 PM CDT  Subject: FW: Prescription renewal request           ---------------------  From: HEATEHR SALMON  To: [facility]  Sent: 08/17/2020 01:15 p.m. CDT  Subject: Prescription renewal request  HEATHER SALMON is requesting renewal of the prescription(s) below and has submitted the following details:  Prescription(s) to be renewed  Medication: Adderall 10 mg oral tablet  Dose: 1 tab(s)  Frequency: once a day (in the evening)  Rx date: 07/20/2020  Prescribed by: Gabriel Teixeira MD  Reason for renewal:   Quantity:   Medication: Adderall XR 30 mg oral capsule, extended release  Dose: 1 cap(s)  Frequency: every morning  Rx date: 07/20/2020  Prescribed by: Gabriel Teixeira MD  Reason for renewal:   Quantity:   Medication: zolpidem 5 mg oral tablet  Dose: 1 tab(s)  Frequency: at bedtime as needed  Rx date: 07/20/2020  Prescribed by: Gabriel Teixeira MD  Reason for renewal:   Quantity:   Delivery option  [place] [address]  Contact Information  By Secure Message

## 2022-02-16 NOTE — TELEPHONE ENCOUNTER
"---------------------  From: Lali Bassett CMA   To: Gabriel Teixeira MD;     Sent: 5/8/2019 4:15:40 PM CDT  Subject: General Message     Phone Message    PCP:   ARACELIS      Time of Call:  _       Person Calling:  Jackie  Phone number:  [phone]    Returned call at: _    Note:   Pt called again to say that she is having an extremely hard time at work staying focused.. her worst time of the day is between 2:00-3:00.    Last office visit and reason:  _    Transferred to: _Pt called this morning again to say that her \"focus med\" starts to wear off about this time while taking the anxiety med and she is starting to make mistakes at work.. please advise---------------------  From: Gabriel Teixeira MD   To: Lali Bassett CMA;     Sent: 5/9/2019 11:59:52 AM CDT  Subject: RE: General Message     The best way to deal with that would be to switch to the 15 mg Adderall and take it in the AM and at Noon.---------------------  From: Lali Bassett CMA   To: Gabriel Teixeira MD;     Sent: 5/9/2019 12:06:07 PM CDT  Subject: RE: General Message     Pt states that she knows that the 15 mg in the morning will not \"be good\" for her.. she would rather stay on the 30 mg.. she is wondering if you have any other suggestions?---------------------  From: Gabriel Teixeira MD   To: Lali Bassett CMA;     Sent: 5/9/2019 12:14:15 PM CDT  Subject: RE: General Message     Maybe she could take it a little later in the morning?---------------------  From: Lali Bassett CMA   To: Gabriel Teixeira MD;     Sent: 5/9/2019 1:53:21 PM CDT  Subject: RE: General Message     Pt would like to try the 15 in the am and 15 in the pm... she states that if that isn't working she will call back.  Can you please send rx to the [facility] for her to start this weekend.---------------------  From: Gabriel Teixeira MD   To: Lali Bassett CMA;     Sent: 5/9/2019 3:29:39 PM CDT  Subject: RE: General Message     Jackie just got a Rx for the 30 mg tabs one week ago.  " I want her to finish those before she gets a new Rx.  She should have one more week of Zolpidem before she needs a refill.  I am concerned that Jackie is wanting to take so many controlled substances.  It is not safe to mix all these powerful and addicting drugs.  The Zolpidem and Tramadol should not be prescribed together since they can be deadly when taken together.message left via secure voicemailpt agreed with finishing 30 mg first.. she stated that she hasn't had to take the zolpidem so she hasn't been

## 2022-02-16 NOTE — TELEPHONE ENCOUNTER
---------------------  From: Lali Bassett CMA   To: Gabriel Teixeira MD;     Sent: 6/3/2019 8:55:29 AM CDT  Subject: refill request-adderall 30 mg     PCP:   ARACELIS    Medication:   Adderall 30 mg  Last Filled:  19    Quantity:  30  Refills:  0      Date of last office visit and reason:   4/15/19      Note    Pharmacy: [facility]    Resource:   Jackie  Phone:   [phone]  ** Submitted: **  Order:amphetamine-dextroamphetamine (Adderall XR 30 mg oral capsule, extended release)  1 cap(s)  Oral  qam  Qty:  30 cap(s)        Refills:  0          Substitutions Allowed     Route To Pharmacy - [facility]    Signed by Gabriel Teixeira MD  6/3/2019 12:16:00 PM

## 2022-02-16 NOTE — PROGRESS NOTES
"   Patient:   HEATHER SALMON            MRN: [id number]            FIN: [id number]               Age:   41 years     Sex:  Female     :  1980   Associated Diagnoses:   ADHD; Generalized anxiety disorder; Migraine   Author:   Gabriel Abarca PA-C      Visit Information      Date of Service: 2021 06:34 am  Performing Location: [facility]  Encounter#: [id number]      Primary Care Provider (PCP):  NONE ,    Visit type:  Video Visit via Venda or [employer].    Participants in room during visit:  _1   Location of patient:  _home  Location of provider:  _ (Clinic office   Video Start Time:  _175  Video End Time:   _181    Today's visit was conducted via video conference due to the COVID-19 pandemic.  The patient's consent to proceed with a video visit has been obtained and documented.      Chief Complaint   2021 5:40 PM CDT    Verbal consent given for a video visit.  Pt is needing refills on ADHD  and anxiety medcheck. Pt would also like to discuss migraines \"when things get humid\"        History of Present Illness   Patient is a _41 year old _female who is being evaluated via a billable video visit.  visit today to discuss her ADHD and anxiety  PHQ-9 is 3, JULITO-7 is 5    currently on Adderall 20 mg bid, she would like to increase that 30 mg bid    alprazolam 0.5 mg q 6 hrs prn anxiety, she takes it 3-4 times a day  we discussed  switching to lorazepam up to twice a day       she gets migraines when it is humid, ibuprofen and excedrin migraine do not help,   is willing to try imitrex for her migraines      Review of Systems   Constitutional:  Negative.    Ear/Nose/Mouth/Throat:  Negative.    Respiratory:  Negative.    Cardiovascular:  Negative.    Gastrointestinal:  Negative.       Health Status   Allergies:    Allergic Reactions (Selected)  Severity Not Documented  Concerta (No reactions were documented)  Glutens (No reactions were documented)  Milk Products (No reactions were " documented)   Medications:  (Selected)   Prescriptions  Prescribed  ALPRAZolam 0.5 mg oral tablet: = 1 tab(s) ( 0.5 mg ), Oral, q6 hrs, PRN: for anxiety, # 24 tab(s), 0 Refill(s), Type: Soft Stop, Pharmacy: [facility], 1 tab(s) Oral q6 hrs,PRN:for anxiety, 66, in, 06/22/21 7:49:00 CDT, Height Measured, 137, lb, 03/23/21 16:10:00 CDT, Weight...  Adderall 20 mg oral tablet: = 1 tab(s) ( 20 mg ), Oral, bid, Instructions: fill on or after 6/22/2021, # 60 tab(s), 0 Refill(s), Type: Maintenance, Pharmacy: [facility], 1 tab(s) Oral bid,Instr:fill on or after 6/22/2021, 66, in, 06/22/21 7:49:00 CDT, Height Measured, 13...  Adderall 20 mg oral tablet: = 1 tab(s) ( 20 mg ), Oral, bid, Instructions: fill on or after 7/26/2021, # 60 tab(s), 0 Refill(s), Type: Maintenance, Pharmacy: [facility], 1 tab(s) Oral bid,Instr:fill on or after 7/26/2021, 66, in, 06/22/21 7:49:00 CDT, Height Measured, 13...   Problem list:    All Problems  Tobacco user / SNOMED CT 409519717 / Probable  ADHD / SNOMED CT 9107582802 / Confirmed  ADHD - Attention deficit disorder with hyperactivity / SNOMED CT 8542061891 / Confirmed  Insomnia / SNOMED CT 175924652 / Confirmed  Panic attacks / SNOMED CT 345013886 / Confirmed  Generalized anxiety disorder / SNOMED CT 10528686 / Confirmed  Low back pain / SNOMED CT 622553206 / Confirmed  JULITO - Generalized anxiety disorder / SNOMED CT 945417652 / Confirmed  Hip pain, bilateral / SNOMED CT 43979140 / Confirmed      Histories   Past Medical History:    Active  ADHD - Attention deficit disorder with hyperactivity (4285282680)  JULITO - Generalized anxiety disorder (379081696)  Low back pain (225769519)  Hip pain, bilateral (43597967)   Family History:    Attention deficit disorder  Brother  Anxiety  Father  Hypertension  Father  Heart disease  Grandfather (M)  Pancreatic cancer  Grandfather (M)  Migraine  Mother  Lyme disease  Sister  Anxiety  Father  Colon cancer  Grandmother (P)  "()  Comments:  10/29/2018 12:11 PM Tiffany Hawthorne  70s  ADHD (attention deficit hyperactivity disorder)....  Brother  Insomnia  Mother     Procedure history:    Cyst of breast (9979838422) on 2014 at 34 Years.  Comments:  10/29/2018 12:08 PM Tiffany Hawthorne  Right  Biopsy (366121693) on 2014 at 34 Years.  Comments:  10/29/2018 11:54 AM Tiffany Hawthorne  Benign fibroadipose tissue.    10/29/2018 11:52 AM Tiffany Hawthorne  Needle biopsy of right breast.  Caesarean section (91453732).  Tubal ligation (675100628).  Tooth extraction, multiple (40543461).   Social History:        Electronic Cigarette/Vaping Assessment            Electronic Cigarette Use: Never.      Alcohol Assessment            Past      Tobacco Assessment            4 or less cigarettes(less than 1/4 pack)/day in last 30 days                     Comments:                      2021 - Dimitrios CMA, Jose Daniel                     Pt states she is \"down to 2 cigarettes a day\"      Substance Abuse Assessment            Never      Employment and Education Assessment            Employed, Work/School description: dental assistant, small business owner.      Home and Environment Assessment            Marital status: .  Spouse/Partner name: Jose Miguel.  Lives with Self, Children.  1 children.      Nutrition and Health Assessment            Diet restrictions: dairy.      Exercise and Physical Activity Assessment            Exercise frequency: 3-4 times/week.        Physical Examination   Measurements from flowsheet : Measurements   2021 5:40 PM CDT    Height Measured - Standard                66 in     General:  No acute distress.    Respiratory:  Respirations are non-labored.    Psychiatric:  Cooperative, Appropriate mood & affect, Normal judgment.       Impression and Plan   Diagnosis     ADHD (OMU55-XO F90.9).     Generalized anxiety disorder (VUZ03-KV F41.1).     Migraine (LAH88-QM G43.909).     Summary:  will trial 30 mg " Adderall bid for a month  will also switch to lorazepam 1 mg q 12 hrs prn anxiety  [place] PDMP consulted, no irregularities noted    for her migraines will trial imitrex    follow up in a month.    Orders     Orders   Pharmacy:  Imitrex 50 mg oral tablet (Prescribe): = 1 tab(s) ( 50 mg ), Oral, once, Instructions: may repeat dose in 2 hours if needed, PRN: for migraine headache, # 9 tab(s), 1 Refill(s), Type: Soft Stop, Pharmacy: [facility], 1 tab(s) Oral once,PRN:for migraine headache,Instr:may repeat dose in 2 hours if needed, 66, in, 8/11/2021 5:40 PM CDT, Height Measured, 137, lb, 3/23/2021 4:10 PM CDT, Weight Measured  LORazepam 1 mg oral tablet (Prescribe): = 1 tab(s) ( 1 mg ), Oral, q12 hrs, PRN: as needed for anxiety, # 24 tab(s), 0 Refill(s), Type: Maintenance, Pharmacy: [facility], 1 tab(s) Oral q12 hrs,PRN:as needed for anxiety, 66, in, 8/11/2021 5:40 PM CDT, Height Measured, 137, lb, 3/23/2021 4:10 PM CDT, Weight Measured  Adderall 30 mg oral tablet (Prescribe): = 1 tab(s) ( 30 mg ), Oral, bid, Instructions: fill on or after 8/11/2021, # 60 tab(s), 0 Refill(s), Type: Maintenance, Pharmacy: [facility], 1 tab(s) Oral bid,Instr:fill on or after 8/11/2021, 66, in, 8/11/2021 5:40 PM CDT, Height Measured, 137, lb, 3/23/2021 4:10 PM CDT, Weight Measured  ALPRAZolam 0.5 mg oral tablet (Complete)  Adderall 20 mg oral tablet (Modify): = 1 tab(s) ( 20 mg ), Oral, bid, Instructions: fill on or after 7/26/2021, # 60 tab(s), 0 Refill(s), Type: Hard Stop, Pharmacy: [facility], 66, in, 06/22/21 7:49:00 CDT, Height Measured, 137, lb, 03/23/21 16:10:00 CDT, Weight Measured.     Orders   Charges (Evaluation and Management):  16804 office o/p est mod 30-39 min (Charge) (Order): Quantity: 1, Generalized anxiety disorder  Migraine  ADHD.

## 2022-02-16 NOTE — NURSING NOTE
Depression Screening Entered On:  12/8/2021 1:49 PM CST    Performed On:  12/8/2021 1:48 PM CST by Jose Daniel Plunkett CMA               Depression Screening   Little Interest - Pleasure in Activities :   Not at all   Feeling Down, Depressed, Hopeless :   Not at all   Initial Depression Screen Score :   0 Score   Poor Appetite or Overeating :   Not at all   Trouble Falling or Staying Asleep :   Several days   Feeling Tired or Little Energy :   Not at all   Feeling Bad About Yourself :   Not at all   Trouble Concentrating :   Not at all   Moving or Speaking Slowly :   Not at all   Difficulty at Work, Home, Getting Along :   Somewhat difficult   Jose Daniel Plunkett CMA - 12/8/2021 1:48 PM CST

## 2022-02-16 NOTE — TELEPHONE ENCOUNTER
---------------------  From: Lali Bassett CMA (eRx Pool ([id number] - [facility]))   To: Gabriel Teixeira MD;     Sent: 10/16/2019 2:05:39 PM CDT  Subject: FW: Medication Management   Due Date/Time: 10/17/2019 1:12:00 PM CDT             ** Patient matched by Lali Bassett CMA on 10/16/2019 2:05:27 PM CDT **      ------------------------------------------  From: [facility]  To: Gabriel Teixeira MD  Sent: October 16, 2019 1:12:11 PM CDT  Subject: Medication Management  Due: October 17, 2019 1:12:11 PM CDT    ** On Hold Pending Signature **  Drug: zolpidem (zolpidem 5 mg oral tablet)  TAKE ONE (1) TABLET EACH NIGHT AT BEDTIME AS NEEDED FOR SLEEP  Quantity: 30 tab(s)  Days Supply: 30  Refills: 1  Substitutions Allowed  Notes from Pharmacy: Generic For:AMBIEN 5MG    Dispensed Drug: zolpidem (zolpidem 5 mg oral tablet)  TAKE ONE (1) TABLET EACH NIGHT AT BEDTIME AS NEEDED FOR SLEEP  Quantity: 30 tab(s)  Days Supply: 30  Refills: 1  Substitutions Allowed  Notes from Pharmacy: Generic For:AMBIEN 5MG  ---------------------------------------------------------------  From: Gabriel Teixeira MD   To: [facility]    Sent: 10/16/2019 4:21:28 PM CDT  Subject: FW: Medication Management     ** Submitted: **  Complete:zolpidem (zolpidem 5 mg oral tablet)   Signed by Gabriel Teixeira MD  10/16/2019 4:21:00 PM    ** Approved **  zolpidem (ZOLPIDEM 5MG)  TAKE ONE (1) TABLET EACH NIGHT AT BEDTIME AS NEEDED FOR SLEEP  Qty:  30 tab(s)        Days Supply:  30        Refills:  1          Substitutions Allowed     Route To Pharmacy - [facility]   Note from Pharmacy:  Generic For:AMBIEN 5MG

## 2022-02-16 NOTE — TELEPHONE ENCOUNTER
---------------------  From: Lali Bassett CMA   To: Gabriel Teixeira MD;     Sent: 3/4/2019 11:36:10 AM CST  Subject: refill request-adderall 30 mg     PCP:   ARACELIS    Medication:   Adderall 30 mg  Last Filled:  19    Quantity:  30  Refills:  0      Date of last office visit and reason:   18      Note:  Pt called requesting refill    Pharmacy: [facility]    Resource:   Jackie  Phone:   [phone]  ** Submitted: **  Order:amphetamine-dextroamphetamine (Adderall XR 30 mg oral capsule, extended release)  1 cap(s)  Oral  qam  Qty:  30 cap(s)        Refills:  0          Substitutions Allowed     Route To Pharmacy - [facility]    Signed by Gabriel Teixeira MD  3/4/2019 12:30:00 PM

## 2022-02-16 NOTE — NURSING NOTE
Comprehensive Intake Entered On:  11/21/2019 11:29 AM CST    Performed On:  11/21/2019 11:26 AM CST by Lali Bassett CMA               Summary   Chief Complaint :   Pt here for  panic attack   Weight Measured :   172 lb(Converted to: 172 lb 0 oz, 78.02 kg)    Height Measured :   66 in(Converted to: 5 ft 6 in, 167.64 cm)    Body Mass Index :   27.76 kg/m2 (HI)    Body Surface Area :   1.9 m2   Systolic Blood Pressure :   146 mmHg (HI)    Diastolic Blood Pressure :   82 mmHg (HI)    Mean Arterial Pressure :   103 mmHg   Peripheral Pulse Rate :   95 bpm   Oxygen Saturation :   97 %   Lali Bassett CMA - 11/21/2019 11:26 AM CST   Health Status   Allergies Verified? :   Yes   Medication History Verified? :   Yes   Medical History Verified? :   Yes   Pre-Visit Planning Status :   Completed   Tobacco Use? :   Former smoker   Lali Bassett CMA - 11/21/2019 11:26 AM CST   Consents   Consent for Immunization Exchange :   Consent Granted   Consent for Immunizations to Providers :   Consent Granted   Lali Bassett CMA - 11/21/2019 11:26 AM CST   Meds / Allergies   (As Of: 11/21/2019 11:29:14 AM CST)   Allergies (Active)   Concerta  Estimated Onset Date:   Unspecified ; Created By:   Lali Bassett CMA; Reaction Status:   Active ; Category:   Drug ; Substance:   Concerta ; Type:   Allergy ; Updated By:   Lali Bassett CMA; Reviewed Date:   11/21/2019 11:27 AM CST      Glutens  Estimated Onset Date:   Unspecified ; Created By:   Tiffany Connolly; Reaction Status:   Active ; Category:   Food ; Substance:   Glutens ; Type:   Allergy ; Updated By:   Tiffany Connolly; Reviewed Date:   11/21/2019 11:27 AM CST      Milk Products  Estimated Onset Date:   Unspecified ; Created By:   Tiffany Connolly; Reaction Status:   Active ; Category:   Food ; Substance:   Milk Products ; Type:   Allergy ; Updated By:   Tiffany Connolly; Reviewed Date:   11/21/2019 11:27 AM CST        Medication List   (As Of: 11/21/2019 11:29:14 AM CST)    Prescription/Discharge Order    ALPRAZolam  :   ALPRAZolam ; Status:   Prescribed ; Ordered As Mnemonic:   ALPRAZolam 0.5 mg oral tablet ; Simple Display Line:   0.5 mg, 1 tab(s), Oral, once, PRN: for anxiety, 1 tab(s), 0 Refill(s) ; Ordering Provider:   Gabriel Teixeira MD; Catalog Code:   ALPRAZolam ; Order Dt/Tm:   9/3/2019 9:20:28 AM CDT          amphetamine-dextroamphetamine  :   amphetamine-dextroamphetamine ; Status:   Prescribed ; Ordered As Mnemonic:   Adderall 10 mg oral tablet ; Simple Display Line:   10 mg, 1 tab(s), Oral, qpm, 30 tab(s), 0 Refill(s) ; Ordering Provider:   Gabriel Teixeira MD; Catalog Code:   amphetamine-dextroamphetamine ; Order Dt/Tm:   10/24/2019 3:36:14 PM CDT          amphetamine-dextroamphetamine  :   amphetamine-dextroamphetamine ; Status:   Prescribed ; Ordered As Mnemonic:   Adderall XR 30 mg oral capsule, extended release ; Simple Display Line:   30 mg, 1 cap(s), Oral, qam, 30 cap(s), 0 Refill(s) ; Ordering Provider:   Gabriel Teixeira MD; Catalog Code:   amphetamine-dextroamphetamine ; Order Dt/Tm:   10/24/2019 3:35:51 PM CDT          zolpidem 5 mg oral tablet  :   zolpidem 5 mg oral tablet ; Status:   Prescribed ; Ordered As Mnemonic:   zolpidem 5 mg oral tablet ; Simple Display Line:   1 tab(s), Oral, qhs, PRN: AS NEEDED FOR SLEEP, 30 tab(s), 1 Refill(s) ; Ordering Provider:   Gabriel Teixeira MD; Catalog Code:   zolpidem ; Order Dt/Tm:   10/16/2019 4:21:26 PM CDT

## 2022-02-16 NOTE — TELEPHONE ENCOUNTER
Entered by Marilin Baeza CMA on October 09, 2021 10:59:24 AM CDT  Pt seen in urgent care today by SONALI      ---------------------  From: HEATHER SALMON  To: [facility]  Sent: 10/08/2021 06:37 p.m. CDT  Subject: Meds ~ URGENT  [facility],  I went to try &  my anxiety meds at [facility] and they wont refill them until the 18th. I didn t get enough for the month because I am taking 4 a day like you suggested. Could you possibly send a different dose over for tomorrow so I don t have to go another week with straight panic, no sleep and constant vomiting? Just enough until I can get the others?  Thank you,  Heather Salmon

## 2022-02-16 NOTE — NURSING NOTE
Comprehensive Intake Entered On:  9/2/2020 11:05 AM CDT    Performed On:  9/2/2020 11:05 AM CDT by Lali Bassett CMA               Summary   Chief Complaint :   consent for telephone visit to discuss anxiety   Height Measured :   66 in(Converted to: 5 ft 6 in, 167.64 cm)    Lali Bassett CMA - 9/2/2020 11:05 AM CDT   Health Status   Allergies Verified? :   Yes   Medication History Verified? :   Yes   Medical History Verified? :   Yes   Lali Bassett CMA - 9/2/2020 11:05 AM CDT   Consents   Consent for Immunization Exchange :   Consent Granted   Consent for Immunizations to Providers :   Consent Granted   Lali Bassett CMA - 9/2/2020 11:05 AM CDT   Meds / Allergies   (As Of: 9/2/2020 11:05:43 AM CDT)   Allergies (Active)   Concerta  Estimated Onset Date:   Unspecified ; Created By:   Lali Bassett CMA; Reaction Status:   Active ; Category:   Drug ; Substance:   Concerta ; Type:   Allergy ; Updated By:   Lali Bassett CMA; Reviewed Date:   9/2/2020 11:05 AM CDT      Glutens  Estimated Onset Date:   Unspecified ; Created By:   Tiffany Connolly; Reaction Status:   Active ; Category:   Food ; Substance:   Glutens ; Type:   Allergy ; Updated By:   Tiffany Connolly; Reviewed Date:   9/2/2020 11:05 AM CDT      Milk Products  Estimated Onset Date:   Unspecified ; Created By:   Tiffany Connolly; Reaction Status:   Active ; Category:   Food ; Substance:   Milk Products ; Type:   Allergy ; Updated By:   Tiffany Connolly; Reviewed Date:   9/2/2020 11:05 AM CDT        Medication List   (As Of: 9/2/2020 11:05:43 AM CDT)   Prescription/Discharge Order    ALPRAZolam  :   ALPRAZolam ; Status:   Prescribed ; Ordered As Mnemonic:   ALPRAZolam 0.5 mg oral tablet ; Simple Display Line:   0.5 mg, 1 tab(s), Oral, q6 hrs, PRN: for anxiety, 30 tab(s), 0 Refill(s) ; Ordering Provider:   Gabriel Teixeira MD; Catalog Code:   ALPRAZolam ; Order Dt/Tm:   8/11/2020 12:44:47 PM CDT          amphetamine-dextroamphetamine  :    amphetamine-dextroamphetamine ; Status:   Prescribed ; Ordered As Mnemonic:   Adderall 10 mg oral tablet ; Simple Display Line:   10 mg, 1 tab(s), Oral, qpm, 30 tab(s), 0 Refill(s) ; Ordering Provider:   Gabriel Teixeira MD; Catalog Code:   amphetamine-dextroamphetamine ; Order Dt/Tm:   8/19/2020 10:00:33 AM CDT          amphetamine-dextroamphetamine  :   amphetamine-dextroamphetamine ; Status:   Prescribed ; Ordered As Mnemonic:   Adderall XR 30 mg oral capsule, extended release ; Simple Display Line:   30 mg, 1 cap(s), Oral, qam, 30 cap(s), 0 Refill(s) ; Ordering Provider:   Gabriel Teixeira MD; Catalog Code:   amphetamine-dextroamphetamine ; Order Dt/Tm:   8/19/2020 10:00:08 AM CDT          busPIRone  :   busPIRone ; Status:   Prescribed ; Ordered As Mnemonic:   busPIRone 10 mg oral tablet ; Simple Display Line:   10 mg, 1 tab(s), Oral, tid, 90 tab(s), 1 Refill(s) ; Ordering Provider:   Gabriel Teixeira MD; Catalog Code:   busPIRone ; Order Dt/Tm:   8/10/2020 8:41:02 AM CDT          zolpidem  :   zolpidem ; Status:   Prescribed ; Ordered As Mnemonic:   zolpidem 5 mg oral tablet ; Simple Display Line:   1 tab(s), Oral, qhs, PRN: AS NEEDED FOR SLEEP, 30 tab(s), 0 Refill(s) ; Ordering Provider:   Gabriel Teixeira MD; Catalog Code:   zolpidem ; Order Dt/Tm:   8/19/2020 10:00:17 AM CDT            ID Risk Screen   Recent Travel History :   No recent travel   Family Member Travel History :   No recent travel   Other Exposure to Infectious Disease :   Unknown   Lali Bassett CMA - 9/2/2020 11:05 AM CDT

## 2022-02-16 NOTE — TELEPHONE ENCOUNTER
---------------------  From: Lali Bassett CMA   To: Gabriel Teixeira MD;     Sent: 10/24/2019 12:07:14 PM CDT  Subject: refill request adderall 30 mg and 10mg     PCP:   ARACELIS    Medication:    adderall 10mg    Medication:    adderall 30 mg     Last Filled:  19        Last Filled:   19           Date of last office visit and reason:   19      Note: pt called requested     Pharmacy: [facility]    Resource:   Jackie  Phone:  [phone]

## 2022-02-16 NOTE — TELEPHONE ENCOUNTER
"---------------------  From: Lali Bassett CMA   To: Gabriel Teixeira MD;     Sent: 2019 8:16:27 AM CST  Subject: General Message     Pt called to say that she just can't wait for this medicine to start working... She is having constant anxiety and is having a \"really bad'\" panic attack this morning.  Pt is quite upset on the phone and having a hard time calming down to talk. Please advise      Jackie  [phone]---------------------  From: Gabriel Teixeira MD   To: Lali Bassett CMA;     Sent: 2019 12:14:05 PM CST  Subject: RE: General Message     eRx to  for Hydroxyzine---------------------  From: Lali Bassett CMA   To: Gabriel Teixeira MD;     Sent: 2019 12:48:10 PM CST  Subject: RE: General Message     Pt says that she has that at home now from past and that does not work for her---------------------  From: Lali Bassett CMA   To: Gabriel Teixeira MD;     Sent: 2019 2:23:11 PM CST  Subject: RE: General Message     Pt states that she is willing to give up the sleep med for a while if there is something that she can get to calm herself down around her periods.... she states that she has tried all the non medicine things like oils, and magnetic bracelets.  She has not tried a weighted blanket yet but she states that she just can't seem to get herself calmed down and has gotten herself so worked up that she throws up.  Pt also states that on top of the anxiety she gets around her period, she is taking son in for a biopsy on a lump that he has and she is quite nervous about that also.---------------------  From: Gabriel Teixeira MD   To: Lali Bassett CMA;     Sent: 2019 3:22:55 PM CST  Subject: RE: General Message     three day eRx to [facility] contacted via secure voicemail that rx is ready for  at the pharmacy  "

## 2022-02-16 NOTE — TELEPHONE ENCOUNTER
---------------------  From: Lali Bassett CMA   To: HEATHER SALMON    Sent: 8/10/2020 9:09:11 AM CDT  Subject: General Message     Heather Teixeira has sent a prescription for Buspirone that you should take in addition to your Alprazolam.. any questions let us know.  Have a great day    ROB Saha with Dr Teixeira

## 2022-02-16 NOTE — NURSING NOTE
Comprehensive Intake Entered On:  9/23/2021 4:35 PM CDT    Performed On:  9/23/2021 4:28 PM CDT by Jose Daniel Plunkett CMA               Summary   Chief Complaint :   Pt here for an anxiety and ADHD medcheck.  Pt also needing 2nd moderna shot.   Weight Measured :   120 lb(Converted to: 120 lb 0 oz, 54.431 kg)    Height Measured :   66 in(Converted to: 5 ft 6 in, 167.64 cm)    Body Mass Index :   19.37 kg/m2   Body Surface Area :   1.59 m2   Systolic Blood Pressure :   130 mmHg   Diastolic Blood Pressure :   74 mmHg   Mean Arterial Pressure :   93 mmHg   Peripheral Pulse Rate :   88 bpm   BP Site :   Right arm   Pulse Site :   Radial artery   Temperature Tympanic :   96.9 DegF(Converted to: 36.1 DegC)  (LOW)    Respiratory Rate :   16 br/min   Oxygen Saturation :   98 %   Jose Daniel Plunkett CMA - 9/23/2021 4:28 PM CDT   Health Status   Allergies Verified? :   Yes   Medication History Verified? :   Yes   Medical History Verified? :   Yes   Pre-Visit Planning Status :   Completed   Tobacco Use? :   Current every day smoker   Tobacco Cessation Review :   Not ready to quit   Jose Daniel Plunkett CMA - 9/23/2021 4:28 PM CDT   Meds / Allergies   (As Of: 9/23/2021 4:35:04 PM CDT)   Allergies (Active)   Concerta  Estimated Onset Date:   Unspecified ; Created By:   Lali Bassett CMA; Reaction Status:   Active ; Category:   Drug ; Substance:   Concerta ; Type:   Allergy ; Updated By:   Lali Bassett CMA; Reviewed Date:   9/23/2021 4:31 PM CDT      Glutens  Estimated Onset Date:   Unspecified ; Created By:   Tiffany Connolly; Reaction Status:   Active ; Category:   Food ; Substance:   Glutens ; Type:   Allergy ; Updated By:   Tiffany Connolly; Reviewed Date:   9/23/2021 4:31 PM CDT      Milk Products  Estimated Onset Date:   Unspecified ; Created By:   Tiffany Connolly; Reaction Status:   Active ; Category:   Food ; Substance:   Milk Products ; Type:   Allergy ; Updated By:   Tiffany Connolly; Reviewed Date:   9/23/2021 4:31 PM CDT        Medication List  "  (As Of: 9/23/2021 4:35:04 PM CDT)   Prescription/Discharge Order    amphetamine-dextroamphetamine  :   amphetamine-dextroamphetamine ; Status:   Prescribed ; Ordered As Mnemonic:   Adderall 30 mg oral tablet ; Simple Display Line:   30 mg, 1 tab(s), Oral, bid, 60 tab(s), 0 Refill(s) ; Ordering Provider:   Keisha Bustos; Catalog Code:   amphetamine-dextroamphetamine ; Order Dt/Tm:   9/10/2021 2:17:11 PM CDT          ALPRAZolam  :   ALPRAZolam ; Status:   Prescribed ; Ordered As Mnemonic:   ALPRAZolam 0.5 mg oral tablet ; Simple Display Line:   0.5 mg, 1 tab(s), Oral, tid, for 30 day(s), ok to fill on or after 9/17/21, PRN: for anxiety, 90 tab(s), 0 Refill(s) ; Ordering Provider:   Keisha Bustos; Catalog Code:   ALPRAZolam ; Order Dt/Tm:   9/10/2021 2:15:32 PM CDT          SUMAtriptan  :   SUMAtriptan ; Status:   Prescribed ; Ordered As Mnemonic:   Imitrex 50 mg oral tablet ; Simple Display Line:   50 mg, 1 tab(s), Oral, once, may repeat dose in 2 hours if needed, PRN: for migraine headache, 9 tab(s), 1 Refill(s) ; Ordering Provider:   Gabriel Abarca PA-C; Catalog Code:   SUMAtriptan ; Order Dt/Tm:   8/11/2021 6:07:06 PM CDT          amphetamine-dextroamphetamine  :   amphetamine-dextroamphetamine ; Status:   Prescribed ; Ordered As Mnemonic:   Adderall 20 mg oral tablet ; Simple Display Line:   20 mg, 1 tab(s), Oral, bid, fill on or after 6/22/2021, 60 tab(s), 0 Refill(s) ; Ordering Provider:   Gabriel Abarca PA-C; Catalog Code:   amphetamine-dextroamphetamine ; Order Dt/Tm:   6/22/2021 8:28:31 AM CDT            Social History   Social History   (As Of: 9/23/2021 4:35:04 PM CDT)   Alcohol:        Past   (Last Updated: 10/11/2018 3:46:51 PM CDT by Tevin GOEL, Gabriel GARCIA)          Tobacco:        4 or less cigarettes(less than 1/4 pack)/day in last 30 days   Comments:  6/22/2021 7:54 AM - Dandre Plunkett CMAle: Pt states she is \"down to 2 cigarettes a day\"   (Last Updated: 6/22/2021 7:54:08 AM CDT by Dimitrios " Jose Daniel RIVAS)          Electronic Cigarette/Vaping:        Electronic Cigarette Use: Never.   (Last Updated: 3/23/2021 4:11:19 PM CDT by Jose Daniel Plunkett CMA)          Substance Abuse:        Never   (Last Updated: 10/11/2018 3:45:06 PM CDT by Gabriel Abarca PA-C)          Employment/School:        Employed, Work/School description: dental assistant, small business owner.   (Last Updated: 10/11/2018 3:46:38 PM CDT by Gabriel Abarca PA-C)          Home/Environment:        Marital status: .  Spouse/Partner name: Jose Miguel.  Lives with Self, Children.  1 children.   (Last Updated: 10/11/2018 3:45:57 PM CDT by Gabriel Abarca PA-C)          Nutrition/Health:        Diet restrictions: dairy.   (Last Updated: 10/16/2018 2:34:54 PM CDT by Ann Llanes)          Exercise:        Exercise frequency: 3-4 times/week.   (Last Updated: 10/11/2018 3:45:19 PM CDT by Gabriel Abarca PA-C)

## 2022-02-16 NOTE — TELEPHONE ENCOUNTER
---------------------  From: Lali Bassett CMA   To: Gabriel Teixeira MD;     Sent: 2019 11:25:10 AM CDT  Subject: refill request- adderall 10mg and 30 mg     PCP:   ARACELIS    Medication:    adderall 10mg    Medication:    adderall 30 mg     Last Filled:  19     Last Filled:              Date of last office visit and reason:   19      Note: pt called requested     [facility]armacy: [facility]    Resource:   Jackie  Phone:  [phone]---------------------  From: Gabriel Teixeira MD   To: Lali Bassett CMA;     Sent: 2019 12:03:58 PM CDT  Subject: RE: refill request- adderall 10mg and 30 mg     Needs appointment to discuss controlled substance agreement. Got Adderall from Dr. Sood in August and got Hydrocodone from Dr. Rodriguez in September.message left via secure voicemail for pt to make appt per URSULA

## 2022-02-16 NOTE — TELEPHONE ENCOUNTER
---------------------  From: Zenaida Arredondo LPN (Phone Messages Pool ([id number]))   To: CC video Message Pool ([id number]);     Sent: 6/15/2021 8:45:00 AM CDT  Subject: Adderall concern     Phone Message    PCP:   ARACELIS asked for DWG      Time of Call:  8:35am       Person Calling:  pt  Phone number:  [phone]    Note:   Pt calling stating she has been on Adderall for years. Pt says for the last 3-4 months they started giving her a different pill of the 30mg XR than she used to get.     Pt says since getting these new Adderall tablets she is getting boils all over, having panic attacks and cannot sleep. Pt also complains of not being able to eat.    Pt says she is scared to keep taking these because of the panic attacks and not being able to eat.    Pt says she is still getting the same 10mg tablets that she has got in the past.    Pt has tired Concerta in the past and had the same problem with the boils.    Pt wanting to know what to do- she says she is willing to go to taking the Adderall BID if she has to.    Pt is not going to take her medication today until she hears what she should do.    Pt due next month for med check    Last office visit and reason:  3/23/21 ADHD, anxiety, panic attacks with DWG---------------------  From: Jose Daniel Plunkett CMA ([employer] Message Pool ([id number]))   To: Gabriel Abarca PA-C;     Sent: 6/15/2021 9:19:57 AM CDT  Subject: FW: Adderall concern---------------------  From: Gabriel Abarac PA-C   To: [employer] Message Pool ([id number]);     Sent: 6/15/2021 9:59:57 AM CDT  Subject: RE: Adderall concern     since she has done well for a long time on the Adderall XR 30mg, she should contact her pharmacist  and see if the pills she used to get are still available or if there are other brands of Adderall XR 30 mg that she could try,  and if the pharmacist is aware of any problems with these new Adderall pillsI called pt and left DWG message on pt personal  "voicemail.  [phone]  Jose Daniel Plunkett CMAPatient called back stating the pharmacy informed her they would have to give her the \"blue\" 30mg tablets when next filled on 6/20/21. Patient is experiencing extreme increase in body temp and welts appearing. She states she is at rock bottom. She is taking on average 4 tablets of Alprazolam daily. She was advised to make an appt w/ DWG to address. She requested a message be sent to see if [facility] has further advise.---------------------  From: Paola Granados CMA ([employer] Message Pool (32224[facility]))   To: [employer] Message Pool (32[address]);     Sent: 6/15/2021 12:22:42 PM CDT  Subject: FW: Adderall concern---------------------  From: Dimitrios RIVASJose Daniel ([employer] Message Pool ([address]224_[facility]))   To: Gabriel Abarca PA-C;     Sent: 6/15/2021 12:26:53 PM CDT  Subject: FW: Adderall concern---------------------  From: Gabriel Abarca PA-C   To: [employer] Message Pool (32224_[facility]);     Sent: 6/15/2021 12:42:50 PM CDT  Subject: RE: Adderall concern     I would advise stopping all of the Adderall for now, take 25 mg benadryl q6-8 hrs for next 3-4 days to get   the reaction to settle down.  But it would be best if she had a clinic appointment to evaluate in personI called pt and gave her DWG message.  Pt states she \"didnt take any of the adderall today and the swelling is better\" I recommended pt follow DWG instructions.  Pt states she \"will do whatever you guys want me to do but im going out of town on 6/17 and cant go that long without my adderall\"  Pt wondering if DWG can \"send an early refill of the \"blue\" 30 mg of adderall.  I again told her to try the benadryl as instructed and call us with update tomorrow.  I informed her that I would forward message to [facility] to inform him of upcoming travel.  [phone](this number is pts but it had a bad connection so she had me call her back at her \"parents\" number [phone] and was able to convey [facility] message to pt.  " Aracelis Plunkett CMA---------------------  From: Aracelis Plunkett CMA ([employer] Message Pool ([address]224_[facility]))   To: Gabriel Abarca PA-C;     Sent: 6/15/2021 1:01:35 PM CDT  Subject: FW: Adderall concern---------------------  From: Gabriel Abarca PA-C   To: [employer] Message Pool (32224_[facility]);     Sent: 6/15/2021 1:25:46 PM CDT  Subject: RE: Adderall concern     Refill of Adderall (30 mg XR and 10 mg IR) for one month sent in, needs clinic visit next month, /I called pt and gave her [employer] message.  [phone]  aracelis Plunkett CMA

## 2022-02-16 NOTE — TELEPHONE ENCOUNTER
---------------------  From: Lali Bassett CMA (eRx Pool (32224_[facility]))   To: Gabriel Teixeira MD;     Sent: 8/10/2020 12:40:46 PM CDT  Subject: FW: Prescription renewal request           ---------------------  From: HEATHER SALMON  To: [facility]  Sent: 08/10/2020 12:05 p.m. CDT  Subject: Prescription renewal request  HEATHER SALMON is requesting renewal of the prescription(s) below and has submitted the following details:  Prescription(s) to be renewed  Medication: ALPRAZolam 0.5 mg oral tablet  Dose: 1 tab(s)  Frequency: every 6 hours as needed  Rx date: 07/13/2020  Prescribed by: Gabriel Teixeira MD  Reason for renewal:   Quantity:   Delivery option  [place] [address]  Phone: [phone]  Contact Information  By Secure Message

## 2022-02-16 NOTE — TELEPHONE ENCOUNTER
---------------------  From: Lali Bassett CMA (eRx Pool ([id number]_[facility]))   To: Gabriel Teixeira MD;     Sent: 1/21/2021 6:56:19 AM CST  Subject: FW: Prescription renewal request           ---------------------  From: HEATHER SALMON  To: [facility]  Sent: 01/20/2021 05:47 p.m. CST  Subject: Prescription renewal request  HEATHER SALMON is requesting renewal of the prescription(s) below and has submitted the following details:  Prescription(s) to be renewed  Medication: Adderall XR 30 mg oral capsule, extended release  Dose: 1 cap(s)  Frequency: every morning  Rx date: 12/21/2020  Prescribed by: Gabriel Teixeira MD  Reason for renewal:   Quantity:   Medication: Adderall 10 mg oral tablet  Dose: 1 tab(s)  Frequency: once a day (in the evening)  Rx date: 12/21/2020  Prescribed by: Gabriel Teixeira MD  Reason for renewal:   Quantity:   Medication: ALPRAZolam 0.5 mg oral tablet  Dose: 1 tab(s)  Frequency: every 6 hours as needed  Rx date: 12/15/2020  Prescribed by: Gabriel Teixeira MD  Reason for renewal:   Quantity:   Delivery option  [place] [address]  Contact Information  Home Phone: [phone]  Mobile Phone: [phone]

## 2022-02-16 NOTE — TELEPHONE ENCOUNTER
---------------------  From: Lali Bassett CMA (Phone Messages Pool ([id number]_[facility]))   To: JACKIE SALMON    Sent: 9/1/2020 5:07:55 PM CDT  Subject: RE: Anxiety     Jackie August would like to do a phone visit with you tomorrow.to discuss this matter.. would that be possible?    Maria A  ---------------------  From: JACKIE SALMON  To: [facility]  Sent: 09/01/2020 03:29 p.m. CDT  Subject: Anxiety  I m having a hard time with my anxiety due to a restraining order with my sister in law. Verbal threats were made upon my parents. (They live with me. Lazaro & Sueleln Quintana.)Is their anything to help with the panic attack part. Every other day its something knew. Police are very aware of what s been happening.  Thanks,  Jackie Salmon

## 2022-02-16 NOTE — TELEPHONE ENCOUNTER
---------------------  From: Josefa Galindo RN (eRx Pool (32224_[facility]))   To: ARACELIS Dominguez [employer] ([address]224_[facility])   ;     Sent: 2020 9:40:59 AM CDT  Subject: FW: Prescription renewal request       Medication Refill Approval Required    PCP:   ARACELIS      Last Filled:  20   Quantity:  30  Refills:  0    CSA on file?    from 2018    Return to Clinic order placed?  None current    Date of last office visit and reason:   20, Panic Attacks    Date of last labs pertaining to condition:  _    Note:  Please advise on refill request and when pt is to return for med check    Resource:   eRx pool    Phone:   _    ---------------------  From: HEATHER SALMON  To: [facility]  Sent: 2020 05:02 p.m. CDT  Subject: Prescription renewal request  HEATHER SALMON is requesting renewal of the prescription(s) below and has submitted the following details:  Prescription(s) to be renewed  Medication: zolpidem 5 mg oral tablet  Dose: 1 tab(s)  Frequency: at bedtime as needed  Rx date: 2020  Prescribed by: Gabriel Teixeira MD  Reason for renewal:   Quantity:   Medication: Adderall 10 mg oral tablet  Dose: 1 tab(s)  Frequency: once a day (in the evening)  Rx date: 2020  Prescribed by: Gabriel Teixeira MD  Reason for renewal:   Quantity:   Medication: Adderall XR 30 mg oral capsule, extended release  Dose: 1 cap(s)  Frequency: every morning  Rx date: 2020  Prescribed by: Gabriel Teixeira MD  Reason for renewal:   Quantity:   Delivery option  [place] [address]  Phone: [phone]  Contact Information  By Secure Message  Comments  Thank you.

## 2022-02-16 NOTE — TELEPHONE ENCOUNTER
---------------------  From: Lali Bassett CMA   To: Gabriel Teixeira MD;     Sent: 2019 12:01:48 PM CDT  Subject: refill request=- adderall 30 mg     PCP:   ARACELIS    Medication:   Adderall 30 mg  Last Filled: 3/4/19     Quantity:  30  Refills:  0      Date of last office visit and reason:   18      Note:  Pt called requesting refill and states she will be making appt for med ck soon    Pharmacy: [facility]    Resource:   Jackie  Phone:   [phone]

## 2022-02-16 NOTE — TELEPHONE ENCOUNTER
---------------------  From: Lali Bassett CMA (eRx Pool ([id number]_[facility] [facility]))   To: Gabriel Teixeira MD;     Sent: 5/28/2019 12:43:57 PM CDT  Subject: FW: Medication Management   Due Date/Time: 5/29/2019 11:05:00 AM CDT             ** Patient matched by Lali Bassett CMA on 5/28/2019 12:43:44 PM CDT **      ------------------------------------------  From: [facility]  To: Gabriel Teixeira MD  Sent: May 28, 2019 11:05:26 AM CDT  Subject: Medication Management  Due: May 29, 2019 11:05:26 AM CDT    ** On Hold Pending Signature **  Drug: amphetamine-dextroamphetamine (amphetamine-dextroamphetamine 10 mg oral tablet)  Take one (1) tablet each evening  Quantity: 30 EA       Days Supply: 0         Refills: 0  Substitutions Allowed  Notes from Pharmacy:     Dispensed Drug: amphetamine-dextroamphetamine (amphetamine-dextroamphetamine 10 mg oral tablet)  Take one (1) tablet each evening  Quantity: 30 EA       Days Supply: 0         Refills: 0  Substitutions Allowed  Notes from Pharmacy:   ---------------------------------------------------------------  From: Gabriel Teixeira MD   To: [facility]    Sent: 5/29/2019 9:00:13 AM CDT  Subject: FW: Medication Management     ** Submitted: **  Complete:amphetamine-dextroamphetamine (amphetamine-dextroamphetamine 30 mg oral capsule, extended release)   Signed by Gabriel Teixeira MD  5/29/2019 9:00:00 AM    ** Approved **  amphetamine-dextroamphetamine (Amphetamine-Dextroamphetamine Tablet 10 MG)  Take one (1) tablet each evening  Qty:  30 EA        Days Supply:  0          Substitutions Allowed     Route To Pharmacy - [facility]

## 2022-02-16 NOTE — TELEPHONE ENCOUNTER
---------------------  From: JACKIE SALMON  To: [facility] Message Pool ([address]_[facility])  Sent: 07/22/2021 11:25 a.m. CDT  Subject: RE: Prescription renewal request  Ok, thank you, i didnt realize I had a refill on the anxiety. The pharmacy said they never got the adhd refill yet.  Thanks,  Jackie Salmon  ???  Addendum by Kesha Rangel CMA on July 21, 2021 9:20:58 AM CDT  ---------------------  From: Kesha Rangel CMA ([facility] Message Pool ([id number]_[facility]))  To: JACKIE SALMON  Sent: 7/21/2021 9:20:58 AM CDT  Subject: RE: Prescription renewal request  ???  Addendum by Kesha Rangel CMA on July 21, 2021 9:20:50 AM CDT  Sunday Mejia,  ???  See Gabriel's message. Please let us know if you have further questions.  ???  Thank you  ???  Kesha  ???  Addendum by Gabriel Abarca PA-C on July 20, 2021 4:15:04 PM CDT  ---------------------  From: Gabriel Abarca PA-C  To: [facility] Message Pool ([address]);  Sent: 7/20/2021 4:15:04 PM CDT  Subject: RE: Prescription renewal request  ???  she has refills available on the alprazolam,  for the Adderall please try the 20 mg dose twice a day for another month  ???  Addendum by Mary Ann Jimenez LPN on July 20, 2021 3:45:25 PM CDT  ---------------------  From: Mary Ann Jimenez LPN ([facility] Message Pool ([address]_[facility]))  To: Gabriel Abarca PA-C;  Sent: 7/20/2021 3:45:25 PM CDT  Subject: FW: Prescription renewal request  ---------------------  From: Marli Abebe CMA (eRx Pool (32224_[facility]))  To: [employer] Message Pool (32224_[facility]);  Sent: 7/20/2021 2:42:37 PM CDT  Subject: FW: Prescription renewal request  ???  ???  Last visit 6/22/21 ADHD  ???  Comments  I started working today, is there anyway I can go back up to my regular dose of 30mg? Thanks, Jackie Salmon  ???  ???  ???  ---------------------  From: JACKIE SALMON  To: [facility]  Sent: 07/19/2021 04:15 p.m. CDT  Subject: Prescription renewal  request  JACKIE REASADIQ is requesting renewal of the prescription(s) below and has submitted the following details:  Prescription(s) to be renewed  Medication: Adderall 20 mg oral tablet  Dose: 1 tab(s)  Frequency: 2 times a day  Rx date: 06/22/2021  Prescribed by: Gabriel Abarca PA-C  Reason for renewal:  Quantity:  Medication: ALPRAZolam 0.5 mg oral tablet  Dose: 1 tab(s)  Frequency: every 6 hours as needed  Rx date: 06/22/2021  Prescribed by: Gabriel Abarca PA-C  Reason for renewal:  Quantity:  Medication: Adderall 20 mg oral tablet  Dose: 1 tab(s)  Frequency: 2 times a day  Rx date: 06/22/2021  Prescribed by: Gabriel Abarca PA-C  Reason for renewal:  Quantity:  Delivery option  Send to my pharmacy  [address] [address]  Contact Information  By Secure Message  Comments  I started working today, is there anyway I can go back up to my regular dose of 30mg? Thanks, Jackie Ceasar---------------------  From: Isabel Bonds CMA ([employer] Message Pool (32224_[facility]))   To: Gabriel Abarca PA-C;     Sent: 7/22/2021 11:27:46 AM CDT  Subject: FW: Prescription renewal request---------------------  From: Gabriel Abarca PA-C   To: [employer] Pool (32224_[facility]);     Sent: 7/22/2021 11:34:52 AM CDT  Subject: RE: Prescription renewal request     2 refills for Adderall were sent on 6/22, on to be filled that day and the other to be filled on or after 7/20, so there should  be a second Rx available to be filled.  Please call her pharmacy and see if they have that second Rx, if not I will send   another one inPharmacy said on 06-16-21 patient picked up 10mg and 30mg ER Adderall sent by [employer]. 06-24-21 patient picked up 20mg Adderall. One renewal of Adderall 20mg at [facility]. Called patient she dropped off the Adderall 10mg and 30mg ER at the [place], [place] police department to be destroyed. She only takes the 20mg Adderall. Notified patient one refills is  available at the pharmacy. No further questions.---------------------  From: Isabel Bonds CMA ([facility] Message Pool ([address]_[facility]))   To: Tevin GOEL, Gabriel GARCIA;     Sent: 7/22/2021 2:55:35 PM CDT  Subject: FW: Prescription renewal request

## 2022-02-16 NOTE — NURSING NOTE
Depression Screening Entered On:  1/30/2020 9:19 AM CST    Performed On:  1/30/2020 9:18 AM CST by Lali Bassett CMA               Depression Screening   Little Interest - Pleasure in Activities :   Not at all   Feeling Down, Depressed, Hopeless :   Several days   Initial Depression Screen Score :   1    Trouble Falling or Staying Asleep :   More than half the days   Feeling Tired or Little Energy :   Not at all   Poor Appetite or Overeating :   Not at all   Feeling Bad About Yourself :   Not at all   Trouble Concentrating :   Several days   Moving or Speaking Slowly :   Several days   Thoughts Better Off Dead or Hurting Self :   Not at all   Detailed Depression Screen Score :   4    Total Depression Screen Score :   5    JULITO Difficulty with Work, Home, Others :   Not difficult at all   Lali Bassett CMA - 1/30/2020 9:18 AM CST

## 2022-02-16 NOTE — TELEPHONE ENCOUNTER
---------------------  From: Lali Bassett CMA   To: Gabriel Teixeira MD;     Sent: 2020 10:50:17 AM CST  Subject: refill request adderall 10 and 30 mg     PCP:   ARACELIS    Medication:    adderall 10mg    Medication:    adderall 30 mg     Last Filled:     20    Last Filled:   20          Date of last office visit and reason:   20      Note:     Pharmacy: [facility]    Resource:   Jackie  Phone:  [phone]  ** Submitted: **  Order:amphetamine-dextroamphetamine (Adderall 10 mg oral tablet)  1 tab(s)  Oral  qpm  Qty:  30 tab(s)        Refills:  0          Substitutions Allowed     Route To [facility]    Signed by Gabriel Teixeira MD  2020 12:17:00 PM    ** Submitted: **  Order:amphetamine-dextroamphetamine (Adderall XR 30 mg oral capsule, extended release)  1 cap(s)  Oral  qam  Qty:  30 cap(s)        Refills:  0          Substitutions Allowed     Route To [facility]    Signed by Gabriel Teixeira MD  2020 12:17:00 PM

## 2022-02-16 NOTE — NURSING NOTE
Generalized Anxiety Disorder Screening Entered On:  4/16/2019 10:06 AM CDT    Performed On:  4/16/2019 10:06 AM CDT by Lali Bassett CMA               Generalized Anxiety Disorder Screening   JULITO Nervous, Anxious On Edge :   More than half the days   JULITO Control Worrying B :   Several days   JULITO Worrying Too Much :   Several days   JULITO Restless :   Several days   JULITO Easily Annoyed/Irritable :   Several days   JULITO Afraid :   Not at all   JULITO Trouble Relaxing :   Nearly every day   JULITO Total Screening Score :   9    JULITO Difficulty with Work, Home, Others :   Somewhat difficult   Lali Bassett CMA - 4/16/2019 10:06 AM CDT

## 2022-02-16 NOTE — TELEPHONE ENCOUNTER
---------------------  From: Marli Abebe CMA (eRx Pool (32224_[facility]))   To: [employer] ([address] [place]   ;     Sent: 12/15/2020 8:16:28 AM CST  Subject: FW: Prescription renewal request       last filled 11/10/20 #60  RTC January    ---------------------  From: JACKIE SALMON  To: [facility]  Sent: 12/14/2020 05:37 p.m. CST  Subject: Prescription renewal request  JACKIE SALMON is requesting renewal of the prescription(s) below and has submitted the following details:  Prescription(s) to be renewed  Medication: ALPRAZolam 0.5 mg oral tablet  Dose: 1 tab(s)  Frequency: every 6 hours as needed  Rx date: 11/10/2020  Prescribed by: Gabriel Teixeira MD  Reason for renewal:   Quantity:   Delivery option  Send [place] [address]  Phone: [phone]  Contact Information  By Secure Message---------------------  From: Lali Bassett CMA ([facility]ACELIS [employer] ([address] [place]   )   To: Gabriel Teixeira MD;     Sent: 12/15/2020 8:21:55 AM CST  Subject: FW: Prescription renewal request---------------------  From: Lali Bassett CMA (ARACELIS [employer] Pool ([address] [place]   )   To: JACKIE SALMON    Sent: 12/15/2020 10:11:41 AM CST  Subject: RE: Prescription renewal request     Jackie aBrnes refill has been sent to the pharmacy.    ROB Saha with Dr Teixeira

## 2022-02-16 NOTE — TELEPHONE ENCOUNTER
---------------------  From: Lali Bassett CMA   To: Gabriel Teixeira MD;     Sent: 2019 10:39:35 AM CST  Subject: refill request-adderall 30 mg     PCP:   ARACELIS    Medication:   Adderall 30 mg  Last Filled:  ?    Quantity:  _  Refills:  _      Date of last office visit and reason:   18      Note:  Pt called requesting refill of Adderall 30 to be sent to [facility]    Pharmacy: [facility]    Resource:   Jackie  Phone:   [phone]

## 2022-02-16 NOTE — TELEPHONE ENCOUNTER
---------------------  From: Angela Calixto (eRx Pool (32224_[facility]))   To: ARACELIS Message Pool (32224_[facility])   ;     Sent: 11/4/2020 10:18:44 AM CST  Subject: FW: Prescription renewal request           ---------------------  From: HEATHER SALMON  To: [facility]  Sent: 11/04/2020 08:40 a.m. CST  Subject: Prescription renewal request  HEATHER SALMON is requesting renewal of the prescription(s) below and has submitted the following details:  Prescription(s) to be renewed  Medication: busPIRone 10 mg oral tablet  Dose: 1 tab(s)  Frequency: 3 times a day  Rx date: 08/10/2020  Prescribed by: Gabriel Teixeira MD  Reason for renewal:   Quantity:   Delivery option  [place] [address]  Phone: [phone]  Contact Information  By Secure Message  Comments  Thank you.

## 2022-02-16 NOTE — TELEPHONE ENCOUNTER
Entered by Jose Daniel Plunkett CMA on June 04, 2021 3:27:40 PM CDT  PCP:   ARACELIS/DERIK saw last    Medication:   alprazolam  Last Filled:  3/23/21    Quantity:  60  Refills:  3    Date of last office visit and reason:   3/23/21 ADHD/andxiety    Date of last Med Check / Px:     Date of last labs pertaining to condition:  Drug screen done 3/23/21    Note:  Pleae advise on refills.  Jose Daniel Plunkett CMA    Return to Clinic order placed?  yes pt due 7/20/21     Resource:   _  Phone:   _  Fax:  _          ** Patient matched by Jose Daniel Plunkett CMA on 6/4/2021 3:22:59 PM CDT **      ------------------------------------------  From: [facility]  To: Hailey Abarca PA-C  Sent: June 4, 2021 9:06:47 AM CDT  Subject: Medication Management  Due: June 4, 2021 8:26:15 PM CDT     ** On Hold Pending Signature **     Drug: ALPRAZolam (ALPRAZolam 0.5 mg oral tablet), TAKE ONE (1) TABLET BY MOUTH EVERY SIX (6) HOURS AS NEEDED FOR ANXIETY  Quantity: 60 tab(s)  Days Supply: 15  Refills: 0  Substitutions Allowed  Notes from Pharmacy:     Dispensed Drug: ALPRAZolam (ALPRAZolam 0.5 mg oral tablet), TAKE ONE (1) TABLET BY MOUTH EVERY SIX (6) HOURS AS NEEDED FOR ANXIETY  Quantity: 60 tab(s)  Days Supply: 15  Refills: 0  Substitutions Allowed  Notes from Pharmacy: * * N O T I C E * * PRESCRIPTION PREVIOUSLY AUTHORIZED BY DOCTOR:HAILEY OLIVERA [phone]* * *  ---------------------------------------------------------------  From: Jose Daniel Plunkett CMA (eRx Pool (32224_[facility]))   To: Hailey Abarca PA-C;     Sent: 6/4/2021 3:27:46 PM CDT  Subject: FW: Medication Management   Due Date/Time: 6/5/2021 9:06:00 AM CDTRx is renewed (#60, no refills)/ [facility] noted---------------------  From: Hailey Abarca PA-C   To: [facility]    Sent: 6/4/2021 3:42:32 PM CDT  Subject: FW: Medication Management     ** Not Approved: filled by alternate method **  ALPRAZolam (ALPRAZOLAM 0.5MG)  TAKE ONE (1) TABLET BY  MOUTH EVERY SIX (6) HOURS AS NEEDED FOR ANXIETY  Qty:  60 tab(s)        Days Supply:  15        Refills:  0          Substitutions Allowed     Route To Pharmacy - [facility]   Note from Pharmacy:  * * N O T I C E * * PRESCRIPTION PREVIOUSLY AUTHORIZED BY DOCTOR:HAILEY OLIVERA [phone]* * *

## 2022-02-16 NOTE — TELEPHONE ENCOUNTER
---------------------  From: Ann Waite   To: Phone Messages Pool ([id number]_[facility]);     Sent: 9/3/2019 9:07:32 AM CDT  Subject: anxiety     Pt called stating she is going to dentist at 2:30pm today for a tooth ache, states she is having bad anxiety about going to the dentist and wondering if she can get a Rx for something to take prior to going- states alprazolam has worked in the past.     pt #: [phone]---------------------  From: Ann Waite (Phone Messages Pool (30158_[facility]))   To: Gabriel Teixeira MD;     Sent: 9/3/2019 9:08:32 AM CDT  Subject: FW: anxiety---------------------  From: Gabriel Teixeira MD   To: Phone Messages Pool (23655_[facility]);     Sent: 9/3/2019 9:23:00 AM CDT  Subject: RE: anxiety     eRx sent to SVDnotified pt by phone call.

## 2022-02-16 NOTE — TELEPHONE ENCOUNTER
"Entered by Aracelis Plunkett CMA on December 07, 2021 4:23:47 PM CST  noted.  aracelis Plunkett CMA      ---------------------  From: HEATHER SALMON  To: [employer] [facility] ([address]224_[facility])  Sent: 12/07/2021 03:47 p.m. CST  Subject: RE: FW: Prescription renewal request  Ok,  Thank you. Ill call and make and set it up asapMary Grace CROCKETT       Addendum by Aracelis Plunkett CMA on December 07, 2021 11:04:35 AM CST  ---------------------  From: Aracelis Plunkett CMA ([facility] Message Pool ([address]224[facility]))  To: HEATHER SALMON  Sent: 12/7/2021 11:04:35 AM CST  Subject: FW: Prescription renewal request       Addendum by Aracelis Plunkett CMA on December 07, 2021 11:04:30 AM CST  Shashi Mejia did fill your medication again but you will need a visit before your next refill.  Thank you and have a nice day Aracelis Plunkett CMA       Addendum by Gabriel Abarca PA-C on December 07, 2021 10:44:28 AM CST  ---------------------  From: Gabriel Abarca PA-C  To: [facility] [employer] [facility] ([address]);  Sent: 12/7/2021 10:44:28 AM CST  Subject: RE: Prescription renewal request       Adderall is filled for a month, needs visit for further refills  [facility] consulted, no irregularities noted       Addendum by Aracelis Plunkett CMA on December 07, 2021 10:23:53 AM CST  ---------------------  From: Aracelis Plunkett CMA ([facility] Message Pool (32224_[facility]))  To: Gabriel Abarca PA-C;  Sent: 12/7/2021 10:23:53 AM CST  Subject: FW: Prescription renewal request  ---------------------  From: Josefa Galindo RN (eRx Pool (32224_[facility]))  To: DERIK Message Pool (32224_[facility]);  Sent: 12/7/2021 10:11:24 AM CST  Subject: FW: Prescription renewal request       Medication Refill Approval Required       PCP:   DERIK       Medication:   Adderall  Last Filled:  On or after 11/6  Quantity:  60 Refills:  0       CSA on file?   Yes       Return to Clinic order placed?  Yes, due for \"anxiety med check\" now       Date of last office " visit and reason:   10/9/21, anxiety disorder       Note:  Last ADHD visit 9/23/21. Please advise on med refill.       Resource:   eRx pool patient portal message       Phone:   _            ---------------------  From: HEATHER SALMON  To: [facility]  Sent: 12/07/2021 06:12 a.m. CST  Subject: Prescription renewal request  HEATHER SALMON is requesting renewal of the prescription(s) below and has submitted the following details:  Prescription(s) to be renewed  Medication: Adderall 30 mg oral tablet  Dose: 1 tab(s)  Frequency: 2 times a day  Rx date: 09/23/2021  Prescribed by: Tevin GOEL, Gabriel GARCIA  Reason for renewal:  Quantity:  Delivery option  [place] [address]  Phone: [phone]  Contact Information  By Secure Message

## 2022-02-16 NOTE — TELEPHONE ENCOUNTER
---------------------  From: Zenaida Arredondo LPN (Phone Messages Pool ([id number]_[facility]))   To: Advanced Practice Provider Pool (17024_[facility]);     Sent: 5/19/2021 11:40:16 AM CDT  Subject: Adderall refill     [facility] out of clinic until 5/24    Phone Message    PCP:   ARACELIS      Time of Call:  11:30am       Person Calling:  pt  Phone number:  [phone]    Note:   Pt calling requesting refills of Adderall. Pt says she will need them tomorrow.  Pt says she dropped her bottles off at [facility] yesterday and she went to  Rx's today and they do not have them.    Pt planning on continuing care with DERIK. She seen him 3/23.    Adderall XR 30mg 1 cap PO qAM (fill on/after 4/21/21)  Adderall 10mg 1 tab PO qpm (fill on/after 4/21)    RTC placed today.  CSA updated 3/23/21   Drug test done 3/23/21    Last office visit and reason:  3/23/21 ADHD, anxiety, panic attacks---------------------  From: Choco GOEL, Zoie DAHL (Advanced Practice Provider Pool (32224_[facility]))   To: [employer] Pool ([address]24_[place]);     Sent: 5/19/2021 12:01:32 PM CDT  Subject: RE: Adderall refill     done  [place] pdmp reviewed and consistent with history.Pt informed.

## 2022-02-16 NOTE — TELEPHONE ENCOUNTER
---------------------  From: Beti Curtis RN (Phone Messages Pool ([id number]_[place] [employer])   To: HEATEHR SALMON    Sent: 9/20/2021 4:26:06 PM CDT  Subject: FW: FW: FW: Meds, and missed appointment     Sunday Mejia,     It sounds like they wanted to follow up with your anxiety.  You should be able to have your follow up appointment and get your second COVID shot on the same day.     Thank you,  Ayden SHELDON RN        ---------------------  From: HEATHER SALMON  To: [facility]  Sent: 09/20/2021 04:20 p.m. CDT  Subject: RE: FW: FW: Meds, and missed appointment  Ok, thanks and I noticed that he said I have depression? I don t have depression. Do I need to still come in and discuss all of this? Or just the shot?  Thanks,  MS  ---------------------  From: Beti Curtis RN (Phone Messages Pool (10407_[employer])  To: HEATHER SALMON  Sent: 9/20/2021 4:16:16 PM CDT  Subject: FW: FW: Meds, and missed appointment       Yefri Mejia,       The Moderna must have 4 weeks/ 28 days between the first and second dose.  It looks like you got the first one on 8/19/21 so you would be in the time frame to get your second dose now.  You can call and schedule that whenever you are ready.       Have a great day,  Ayden SHELDON RN                 ---------------------  From: HEATHER SALMON  To: [facility]  Sent: 09/20/2021 04:12 p.m. CDT  Subject: RE: FW: Meds, and missed appointment  Yes, I picked up the anxiety meds. Thank you for that. What is the time frame for the second shot?  Thanks,  MS  ---------------------  From: Analisa OJEDA, Jossy (Phone Messages Pool (32224_[facility]))  To: HEATHER SALMON  Sent: 9/20/2021 8:49:56 AM CDT  Subject: FW: Meds, and missed appointment       Sunday Mejia,       We received your message.  I was reviewing your chart and it looks like Gabriel Abarca PA-C wants to see you again for a follow up med check  for your depression/anxiety.  It looks like a refill of the alprazolam was sent in 9/10/2021 with a refill date of 9/17/2021 and that it was sent to [facility].  You can call the clinic to schedule that follow up appointment with Gabriel Abarca as well as your COVID shot.       Sincerely,       Jossy JETER CMA  ---------------------  From: HEATHER SALMON  To: [facility]  Sent: 09/18/2021 05:07 p.m. CDT  Subject: Meds, and missed appointment  Sorry to bother you again but I need some advise about my anxiety med. With my new job I take 1 in the morning & 1 around noon. Sometimes I need one in the late afternoon & 1 at night before bed. 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 I need to stay calm so these kids stay calm. Its hard & I m hoping to overcome this in time. Last week I ran out early & I didn t sleep for days. That made it very hard to work because I was in panic attack for about 4 days. I also realized I forgot to get my 2nd shot. Please tell me its not to late for me to get it?  Thanks for your time,  Heather Salmon

## 2022-02-16 NOTE — TELEPHONE ENCOUNTER
---------------------  From: Jose Daniel Plunkett CMA ([employer] (32224_[facility]))   To: Gabriel Abarca PA-C;     Sent: 7/26/2021 8:23:47 AM CDT  Subject: FW: Prescription renewal request - Safety           ---------------------  From: HEATHER SALMON  To: [employer] ([facility]224[facility])  Sent: 07/24/2021 12:11 p.m. CDT  Subject: RE: Prescription renewal request - Safety  I talked with a lady last week about my old 30mg, I told her they where brought to the [place] [facility]. I then called [facility] pharmacy on Thur. to see if I could  adhd & anxiety meds. When I got there on Saturday [facility] told me he can not give me the adhd meds. I would like to know why? I passed my drug test. IF there was something left in my system from cbd oils that is not my fault. This should not keep happening every time I try to get my meds. Now at work when I need to be focused for SAFTY reasons I m left with this crap again. You want me to keep calm? Then I need to understand why this keeps happening. I go to work, I don t collect gov. money like I could. I m doing everything by the book. I would like a response asap. I start at 7:00am Monday I can t look at the portal & I cant stop to answer my phone. I already dropped 30mg down to 20mg. I would have liked to do this conversation face to face.  Thanks,  Heather Salmon(OBEY)       Addendum by Kesha Rangel CMA on July 21, 2021 9:20:58 AM CDT  ---------------------  From: Kesha Rangel CMA ([employer] Message Pool (32224_[facility]))  To: HEATHER SALMON  Sent: 7/21/2021 9:20:58 AM CDT  Subject: RE: Prescription renewal request       Addendum by Kesha Rangel CMA on July 21, 2021 9:20:50 AM CDT  Sunday Mejia,       See Gabriel stewart message. Please let us know if you have further questions.       Thank you       Kesha       Addendum by Gabriel Abarca PA-C on July 20, 2021 4:15:04 PM CDT  ---------------------  From: Gabriel Abarca PA-C  To: DERIK  Message Pool (32224_[facility]);  Sent: 7/20/2021 4:15:04 PM CDT  Subject: RE: Prescription renewal request       she has refills available on the alprazolam,  for the Adderall please try the 20 mg dose twice a day for another month       Addendum by Mary Ann Jimenez LPN on July 20, 2021 3:45:25 PM CDT  ---------------------  From: Mary Ann Jimenez LPN ([facility] Message Pool (32224_[facility]))  To: Gabriel Abarca PA-C;  Sent: 7/20/2021 3:45:25 PM CDT  Subject: FW: Prescription renewal request  ---------------------  From: Marli Abebe CMA (eRx Pool (32224[facility]))  To: [facility] Message Pool (32224_[facility]);  Sent: 7/20/2021 2:42:37 PM CDT  Subject: FW: Prescription renewal request            Last visit 6/22/21 ADHD       Comments  I started working today, is there anyway I can go back up to my regular dose of 30mg? Thanks, Jackie Salmon                 ---------------------  From: JACKIE SALMON  To: [facility]  Sent: 07/19/2021 04:15 p.m. CDT  Subject: Prescription renewal request  JACKIE SALMON is requesting renewal of the prescription(s) below and has submitted the following details:  Prescription(s) to be renewed  Medication: Adderall 20 mg oral tablet  Dose: 1 tab(s)  Frequency: 2 times a day  Rx date: 06/22/2021  Prescribed by: Gabriel Abarca PA-C  Reason for renewal:  Quantity:  Medication: ALPRAZolam 0.5 mg oral tablet  Dose: 1 tab(s)  Frequency: every 6 hours as needed  Rx date: 06/22/2021  Prescribed by: Gabriel Abarca PA-C  Reason for renewal:  Quantity:  Medication: Adderall 20 mg oral tablet  Dose: 1 tab(s)  Frequency: 2 times a day  Rx date: 06/22/2021  Prescribed by: Tevin GOEL, Gabriel GARCIA  Reason for renewal:  Quantity:  Delivery option  Send to my pharmacy  [address] [address]  Contact Information  By Secure Message  Comments  I started working today, is there anyway I can go back up to my regular  dose of 30mg? Thanks, Jackie McdonoughPatient left message this morning asking for an update regarding the refill.  Patient says she is at work and may not be able to answer the phone.  OK to LM with response.

## 2022-02-16 NOTE — TELEPHONE ENCOUNTER
---------------------  From: Estella Gonzales   To: Lali Bassett CMA;     Sent: 5/20/2020 8:10:07 AM CDT  Subject: General Message     med refill - zolpidem  adderall 10mg - adderall 30 mg---------------------  From: Lali Bassett CMA   To: Gabriel Teixeira MD;     Sent: 5/20/2020 8:40:00 AM CDT  Subject: refill request  ** Submitted: **  Order:amphetamine-dextroamphetamine (Adderall 10 mg oral tablet)  1 tab(s)  Oral  qpm  Qty:  30 tab(s)        Refills:  0          Substitutions Allowed     Route To [facility]    Signed by Gabriel Teixeira MD  5/20/2020 5:09:00 PM    ** Submitted: **  Order:amphetamine-dextroamphetamine (Adderall XR 30 mg oral capsule, extended release)  1 cap(s)  Oral  qam  Qty:  30 cap(s)        Refills:  0          Substitutions Allowed     Route To [facility]    Signed by Gabriel Teixeira MD  5/20/2020 5:09:00 PM    ** Submitted: **  Order:zolpidem (zolpidem 5 mg oral tablet)  1 tab(s)  Oral  qhs  Qty:  30 tab(s)        Refills:  0          PRN  AS NEEDED FOR SLEEP      Route To [facility]    Signed by Gabriel Teixeira MD  5/20/2020 5:09:00 PM

## 2022-02-16 NOTE — TELEPHONE ENCOUNTER
---------------------  From: Lali Bassett CMA (Phone Messages Pool ([id number]_[facility]))   To: Gabriel Teixeira MD;     Sent: 9/1/2020 4:46:56 PM CDT  Subject: FW: Anxiety           ---------------------  From: JACKIE SALMON  To: [facility]  Sent: 09/01/2020 03:29 p.m. CDT  Subject: Anxiety  I m having a hard time with my anxiety due to a restraining order with my sister in law. Verbal threats were made upon my parents. (They live with me. Lazaro & Suellen Quintana.)Is their anything to help with the panic attack part. Every other day its something knew. Police are very aware of what s been happening.  Thanks,  Jackie Salmon

## 2022-02-16 NOTE — NURSING NOTE
Comprehensive Intake Entered On:  3/23/2021 4:21 PM CDT    Performed On:  3/23/2021 4:10 PM CDT by Jose Daniel Plunkett CMA               Summary   Chief Complaint :   Pt here for an ADHD and anxiety medcheck.   Weight Measured :   137 lb(Converted to: 137 lb 0 oz, 62.142 kg)    Height Measured :   66 in(Converted to: 5 ft 6 in, 167.64 cm)    Body Mass Index :   22.11 kg/m2   Body Surface Area :   1.7 m2   Systolic Blood Pressure :   134 mmHg (HI)    Diastolic Blood Pressure :   80 mmHg   Mean Arterial Pressure :   98 mmHg   Peripheral Pulse Rate :   88 bpm   BP Site :   Right arm   Pulse Site :   Radial artery   Temperature Tympanic :   97 DegF(Converted to: 36.1 DegC)  (LOW)    Respiratory Rate :   16 br/min   Jose Daniel Plunkett CMA - 3/23/2021 4:10 PM CDT   Health Status   Allergies Verified? :   Yes   Medication History Verified? :   Yes   Medical History Verified? :   Yes   Pre-Visit Planning Status :   Completed   Tobacco Use? :   Former smoker   Jose Daniel Plunkett CMA - 3/23/2021 4:10 PM CDT   Meds / Allergies   (As Of: 3/23/2021 4:21:27 PM CDT)   Allergies (Active)   Concerta  Estimated Onset Date:   Unspecified ; Created By:   Lali Bassett CMA; Reaction Status:   Active ; Category:   Drug ; Substance:   Concerta ; Type:   Allergy ; Updated By:   Lali Bassett CMA; Reviewed Date:   3/23/2021 4:16 PM CDT      Glutens  Estimated Onset Date:   Unspecified ; Created By:   Tiffany Connolly; Reaction Status:   Active ; Category:   Food ; Substance:   Glutens ; Type:   Allergy ; Updated By:   Tiffany Connolly; Reviewed Date:   3/23/2021 4:16 PM CDT      Milk Products  Estimated Onset Date:   Unspecified ; Created By:   Tiffany Connolly; Reaction Status:   Active ; Category:   Food ; Substance:   Milk Products ; Type:   Allergy ; Updated By:   Tiffany Connolly; Reviewed Date:   3/23/2021 4:16 PM CDT        Medication List   (As Of: 3/23/2021 4:21:27 PM CDT)   Prescription/Discharge Order    ALPRAZolam  :   ALPRAZolam ; Status:   Prescribed ;  Ordered As Mnemonic:   ALPRAZolam 0.5 mg oral tablet ; Simple Display Line:   0.5 mg, 1 tab(s), Oral, q6 hrs, PRN: for anxiety, 60 tab(s), 0 Refill(s) ; Ordering Provider:   Gabriel Teixeira MD; Catalog Code:   ALPRAZolam ; Order Dt/Tm:   2/22/2021 4:38:05 PM CST          amphetamine-dextroamphetamine  :   amphetamine-dextroamphetamine ; Status:   Prescribed ; Ordered As Mnemonic:   Adderall XR 30 mg oral capsule, extended release ; Simple Display Line:   30 mg, 1 cap(s), Oral, qam, 30 cap(s), 0 Refill(s) ; Ordering Provider:   Gabriel Teixeira MD; Catalog Code:   amphetamine-dextroamphetamine ; Order Dt/Tm:   2/22/2021 4:37:59 PM CST          zolpidem  :   zolpidem ; Status:   Prescribed ; Ordered As Mnemonic:   zolpidem 5 mg oral tablet ; Simple Display Line:   1 tab(s), Oral, qhs, PRN: AS NEEDED FOR SLEEP, 30 tab(s), 0 Refill(s) ; Ordering Provider:   Gabriel Teixeira MD; Catalog Code:   zolpidem ; Order Dt/Tm:   2/22/2021 4:37:47 PM CST          amphetamine-dextroamphetamine  :   amphetamine-dextroamphetamine ; Status:   Prescribed ; Ordered As Mnemonic:   Adderall 10 mg oral tablet ; Simple Display Line:   10 mg, 1 tab(s), Oral, qpm, 30 tab(s), 0 Refill(s) ; Ordering Provider:   Gabriel Teixeira MD; Catalog Code:   amphetamine-dextroamphetamine ; Order Dt/Tm:   2/22/2021 4:37:54 PM CST          busPIRone  :   busPIRone ; Status:   Prescribed ; Ordered As Mnemonic:   busPIRone 10 mg oral tablet ; Simple Display Line:   10 mg, 1 tab(s), Oral, tid, 90 tab(s), 1 Refill(s) ; Ordering Provider:   Gabriel Teixeira MD; Catalog Code:   busPIRone ; Order Dt/Tm:   11/4/2020 3:54:45 PM CST            ID Risk Screen   Recent Travel History :   No recent travel   Family Member Travel History :   No recent travel   Other Exposure to Infectious Disease :   Unknown   COVID-19 Testing Status :   No COVID-19 test performed   Jose Daniel Plunkett CMA - 3/23/2021 4:10 PM CDT   Social History   Social History   (As Of: 3/23/2021 4:21:27 PM CDT)    Alcohol:        Past   (Last Updated: 10/11/2018 3:46:51 PM CDT by Gabriel Abarca PA-C)          Tobacco:        Former smoker, quit more than 30 days ago   Comments:  10/11/2018 3:44 PM - Gabriel Abarca PA-C: quit 2010   (Last Updated: 3/23/2021 4:11:15 PM CDT by Jose Daniel Plunkett CMA)          Electronic Cigarette/Vaping:        Electronic Cigarette Use: Never.   (Last Updated: 3/23/2021 4:11:19 PM CDT by Jose Daniel Plunkett CMA)          Substance Abuse:        Never   (Last Updated: 10/11/2018 3:45:06 PM CDT by Gabriel Abarca PA-C)          Employment/School:        Employed, Work/School description: dental assistant, small business owner.   (Last Updated: 10/11/2018 3:46:38 PM CDT by Gabriel Abarca PA-C)          Home/Environment:        Marital status: .  Spouse/Partner name: Jose Miguel.  Lives with Self, Children.  1 children.   (Last Updated: 10/11/2018 3:45:57 PM CDT by Gabriel Abarca PA-C)          Nutrition/Health:        Diet restrictions: dairy.   (Last Updated: 10/16/2018 2:34:54 PM CDT by Ann Llanes)          Exercise:        Exercise frequency: 3-4 times/week.   (Last Updated: 10/11/2018 3:45:19 PM CDT by Gabriel Abarca PA-C)

## 2022-02-16 NOTE — LETTER
(Inserted Image. Unable to display)   September 20, 2021    HEATHER SALMON  [address] [place] [id number]            Dear HEATHER,      Thank you for selecting [facility] for your healthcare needs.    Our records indicate you are due for the following services:     Follow-up office visit / Medication Check.    (FYI   Regarding office visits: In some instances, a video visit or telephone visit may be offered as an option.)      To schedule an appointment or if you have further questions, please contact your clinic at [phone].      Powered by [employer]    Sincerely,    Gabriel Abarca PA-C   98

## 2022-02-16 NOTE — PROGRESS NOTES
Patient:   HEATHER SALMON            MRN: [id number]            FIN: [id number]               Age:   38 years     Sex:  Female     :  1980   Associated Diagnoses:   Generalized anxiety disorder; Insomnia; ADHD   Author:   Gabriel Teixeira MD      Impression and Plan   Diagnosis     Generalized anxiety disorder (QJK06-NI F41.1).     Course:  Worsening.    Plan:  increase sertraline to 75 mg daily.    Orders     Orders   Charges (Evaluation and Management):  01778 office outpatient visit 25 minutes (Charge) (Order): Quantity: 1, Insomnia  Generalized anxiety disorder.     Orders (Selected)   Prescriptions  Prescribed  sertraline 50 mg oral tablet: = 1.5 tab(s) ( 75 mg ), PO, Daily, # 45 tab(s), 0 Refill(s), Type: Maintenance, other reason (Rx).     Diagnosis     Insomnia (FEC07-FV G47.00).     Course:  Progressing as expected.    Orders     Orders (Selected)   Prescriptions  Prescribed  zolpidem 5 mg oral tablet: = 1 tab(s) ( 5 mg ), PO, Once a day (at bedtime), PRN: for sleep, # 30 tab(s), 0 Refill(s), Type: Maintenance, other reason (Rx).     Diagnosis     ADHD (CHG51-PH F90.9).     Course:  Progressing as expected.    Orders     Orders (Selected)   Documented Medications  Documented  Adderall 10 mg oral tablet: = 1 tab(s) ( 10 mg ), Oral, qpm, 0 Refill(s), Type: Maintenance  Adderall XR 30 mg oral capsule, extended release: = 1 cap(s) ( 30 mg ), Oral, qam, 0 Refill(s), Type: Maintenance.        Visit Information      Date of Service: 2018 03:40 pm  Performing Location: [facility]  Encounter#: [id number]      Primary Care Provider (PCP):  NONE ,    Visit type:  Scheduled follow-up.    Accompanied by:  No one.    Source of history:  Self.    Referral source:  Self.    History limitation:  None.       Chief Complaint   2018 3:42 PM CST   Pt here for med ck        History of Present Illness             The patient presents Attention Deficit Discorder.  There are no modifying  factors.  Associated symptoms consist of denies decrease in social interaction, denies fatigue, denies impaired judgement, denies insomnia, denies loss of appetite and denies weight loss.  Additional pertinent history: none.               The patient presents with insomnia.  The severity of the insomnia is severe.  The insomnia is constant.  The insomnia has lasted for several years.  There are no modifying factors.  Associated symptoms consist of fatigue.  Additional pertinent history: none.               The patient presents with anxiety.  The anxiety is characterized by difficulty concentrating, irritability, nervousness and restlessness.  The severity of the anxiety is moderate.  The anxiety is constant.  The context of the anxiety: occurred in association with the inability to cope with stress.  Exacerbating factors consist of emotional stress and social change.  Relieving factors consist of medication and stress reduction.        Review of Systems   Constitutional:  Negative.    Eye:  Negative.    Ear/Nose/Mouth/Throat:  Negative.    Respiratory:  Negative.    Cardiovascular:  Negative.    Gastrointestinal:  Negative.    Genitourinary:  Negative.    Hematology/Lymphatics:  Negative.    Endocrine:  Negative.    Immunologic:  Negative.    Musculoskeletal:  Negative.    Integumentary:  Negative.    Neurologic:  Negative.    Psychiatric:  Negative except as documented in history of present illness.    All other systems reviewed and negative      Health Status   Allergies:    Allergic Reactions (Selected)  Severity Not Documented  Concerta (No reactions were documented)  Glutens (No reactions were documented)  Milk Products (No reactions were documented)   Medications:  (Selected)   Prescriptions  Prescribed  zolpidem 5 mg oral tablet: = 1 tab(s) ( 5 mg ), PO, Once a day (at bedtime), PRN: for sleep, # 30 tab(s), 0 Refill(s), Type: Maintenance, other reason (Rx)  Documented Medications  Documented  Adderall 10 mg  oral tablet: = 1 tab(s) ( 10 mg ), Oral, qpm, 0 Refill(s), Type: Maintenance  Adderall XR 30 mg oral capsule, extended release: = 1 cap(s) ( 30 mg ), Oral, qam, 0 Refill(s), Type: Maintenance  sertraline 25 mg oral tablet: = 1 tab(s) ( 25 mg ), Oral, daily, 0 Refill(s), Type: Maintenance   Problem list:    All Problems  ADHD / SNOMED CT 8644784734 / Confirmed  ADHD - Attention deficit disorder with hyperactivity / SNOMED CT 7720535769 / Confirmed  JULITO - Generalized anxiety disorder / SNOMED CT 995978914 / Confirmed  Generalized anxiety disorder / SNOMED CT 17558229 / Confirmed  Hip pain, bilateral / SNOMED CT 95141217 / Confirmed  Insomnia / SNOMED CT 079858534 / Confirmed  Low back pain / SNOMED CT 091767212 / Confirmed      Histories   Past Medical History:    Active  ADHD - Attention deficit disorder with hyperactivity (0137302830)  JULITO - Generalized anxiety disorder (424903856)  Low back pain (849834220)  Hip pain, bilateral (07314614)   Family History:    Attention deficit disorder  Brother  Anxiety  Father  Hypertension  Father  Heart disease  Grandfather (M)  Pancreatic cancer  Grandfather (M)  Migraine  Mother  Lyme disease  Sister  Anxiety  Father  Colon cancer  Grandmother (P) ()  Comments:  10/29/2018 12:11 PM Tiffany Hawthorne  70s  ADHD (attention deficit hyperactivity disorder)....  Brother  Insomnia  Mother     Procedure history:    Cyst of breast (SNOMED CT 9537543252) on 2014 at 34 Years.  Comments:  10/29/2018 12:08 PM Tiffany Hawthorne  Right  Biopsy (SNOMED CT 431662063) on 2014 at 34 Years.  Comments:  10/29/2018 11:54 AM Tiffany Hawthorne  Benign fibroadipose tissue.    10/29/2018 11:52 AM Tiffany Hawthorne  Needle biopsy of right breast.  Caesarean section (SNOMED CT 75765913).  Tubal ligation (SNOMED CT 156567639).  Tooth extraction, multiple (SNOMED CT 93028601).      Physical Examination   Vital Signs   2018 3:42 PM CST Peripheral Pulse Rate 86 bpm    Systolic Blood  Pressure 132 mmHg  HI    Diastolic Blood Pressure 80 mmHg    Mean Arterial Pressure 97 mmHg    BP Site Right arm    Oxygen Saturation 99 %      Measurements from flowsheet : Measurements   12/27/2018 3:42 PM CST Height Measured - Standard 66 in    Weight Measured - Standard 172 lb    BSA 1.9 m2    Body Mass Index 27.76 kg/m2  HI      PHQ 9 score: 3  JULITO 7 score: 9   General:  No acute distress.    Neck:  Supple, No lymphadenopathy, No thyromegaly.    Respiratory:  Lungs are clear to auscultation, Respirations are non-labored, Breath sounds are equal, Symmetrical chest wall expansion.    Cardiovascular:  Normal rate, Regular rhythm, No murmur, No gallop, Good pulses equal in all extremities, Normal peripheral perfusion, No edema.    Gastrointestinal:  Soft, Non-tender, Non-distended, Normal bowel sounds, No organomegaly.    Integumentary:  Warm, Dry, Pink.    Neurologic:  Alert, Oriented.    Psychiatric:  Cooperative, Appropriate mood & affect, Non-suicidal.         Mood and affect: Calm.         Behavior: Relaxed.         Judgment: Able to make sensible decisions, Appropriate in social situations.         Thought process: Appropriate.

## 2022-02-16 NOTE — TELEPHONE ENCOUNTER
---------------------  From: Dino Nidhi RIVAS (zzeRx Pool ([id number]_[facility]))   To: Serjio RIVASLali;     Sent: 9/1/2020 4:57:59 PM CDT  Subject: FW: Prescription renewal request           ---------------------  From: JACKIE SALMON  To: [facility]  Sent: 08/27/2020 05:48 p.m. CDT  Subject: Prescription renewal request  JACKIE SALMON is requesting renewal of the prescription(s) below and has submitted the following details:  Prescription(s) to be renewed  Medication: ALPRAZolam 0.5 mg oral tablet  Dose: 1 tab(s)  Frequency: every 6 hours as needed  Rx date: 08/11/2020  Prescribed by: Gabriel Teixeira MD  Reason for renewal:   Quantity:   Delivery option  [place] [address]  Contact Information  By Secure Message  Comments  The buspirone is helping with my nervousness. But I had to get a restraining order against my sister in law { Nidhi Guzman sister}  to protect my parents, and my 13 year old son. The ALPRAZolam works great but is their anyway I could get a higher dose? I will break them in half. I promise I will not abuse this, its just getting really bad and the threats towards my parents are heartbreaking. The Law is on board with everything happening. To many 911 calls. Please know I m just trying to hold everything together. Thanks, Jackie Salmon

## 2022-02-16 NOTE — PROGRESS NOTES
Chief Complaint    c/o panic attack, is taking Alprazolam 4 times a day instead of 2 times a day, needing enough medication until refill on 10/18/21  History of Present Illness      Chief complaint as above reviewed and confirmed with patient.  Pt presents to the clinic with concerns re: need for refill of her alprazolam.  she is using 4 tabs per day, one in the am, one after noon and 2 pm.  this is consistent with most recent progress note from PCP.  REcently increased from 3 x per day to 4 x per day.  New RX provided by pcp states such and may be filled on or after 10-8-21. however pt has gone to pharmacy and told she can not fill utnil 10-18-21.  Called pharmacy and they indicate that it can be filled but insurance will not pay. They have tried an overide as the RX has been changed from 3xper day to 4 x per day and it was rejected.  Pharmacist indicates that that she can get 4 day supply of 16 tabs that could be filled to get her through until insurance will fill but it will decrease her total RX for next month.  Review of Systems      Review of systems is negative with the exception of those noted in HPI   Physical Exam   Vitals & Measurements    T: 97.8  F (Tympanic)  HR: 88 (Peripheral)  BP: 132/84     HT: 66 in  WT: 124.9 lb  BMI: 20.16       nad appears well      vitals as per nursing documentation.   Assessment/Plan       1. Generalized anxiety disorder (F41.1)         I reviewed [place] PMDP and it is consistent with history and medical records.  Pt given 16 tablets, no refills as per previous RX.  Pt notified it would decrease from total from next month in terms of insurance coverage.  Pt is not on SSRI or SNRI for her anxiety/panic attack and states she has tried multiple medications over the years.  I have recommended she fu with pcp to reassess over the next 1 week as there will be ongoing problems with insurance coverage and need for this medication over time.    Pt agreeable with plan.       Orders:          ALPRAZolam, See Instructions, Instructions: 1 tab(s) Oral in am, 1 po q afternoon, 2 at bedtime, PRN: for anxiety, # 16 tab(s), 0 Refill(s), Type: Acute, Pharmacy: [id number], 1 tab(s) Oral in am, 1 po q afternoon, 2 at bedtime,PRN:for anxiety, 6..., (Ordered)  Patient Information     Name:HEATHER SALMON      Address:      [address] [place] [phone]     Sex:Female     YOB: 1980     [phone]     Emergency Contact:ZECHARIAH BARNHART     MRN:[id number]     FIN:[id number]     Location[facility]     Date of Service:10/09/2021      Primary Care Physician:       NEGRITA       Attending Physician:       Zoie Wilhelm PA-C, [phone]  Problem List/Past Medical History    Ongoing     ADHD     ADHD - Attention deficit disorder with hyperactivity     JULITO - Generalized anxiety disorder     Generalized anxiety disorder     Hip pain, bilateral     Insomnia     Low back pain     Migraine     Panic attacks     Tobacco user    Historical     No qualifying data  Procedure/Surgical History     Cyst of breast (07/31/2014)      Comments: Right.     Biopsy (07/18/2014)      Comments: Benign fibroadipose tissue.. Needle biopsy of right breast..     Caesarean section     Tooth extraction, multiple     Tubal ligation  Medications    Adderall 30 mg oral tablet, 30 mg= 1 tab(s), Oral, bid    Adderall 30 mg oral tablet, 30 mg= 1 tab(s), Oral, bid    ALPRAZolam 0.5 mg oral tablet, See Instructions, PRN, 2 refills    ALPRAZolam 0.5 mg oral tablet, See Instructions, PRN    Imitrex 50 mg oral tablet, 50 mg= 1 tab(s), Oral, once, PRN, 1 refills  Allergies    Concerta    Glutens    Milk Products  Social History    Smoking Status     Current every day smoker     Alcohol      Past     Electronic Cigarette/Vaping      Electronic Cigarette Use: Never.     Employment/School      Employed, Work/School description: dental assistant, small business owner.     Exercise      Exercise  frequency: 3-4 times/week.     Home/Environment      Marital status: . Spouse/Partner name: Jose Miguel. Lives with Self, Children. 1 children.     Nutrition/Health      Diet restrictions: dairy.     Substance Abuse      Never     Tobacco      4 or less cigarettes(less than 1/4 pack)/day in last 30 days  Family History    ADHD (attention deficit hyperactivity disorder)....: Brother.    Anxiety: Father.    Anxiety: Father.    Attention deficit disorder: Brother.    Colon cancer: Grandmother (P).    Heart disease: Grandfather (M).    Hypertension: Father.    Insomnia: Mother.    Lyme disease: Sister.    Migraine: Mother.    Pancreatic cancer: Grandfather (M).  Immunizations       Scheduled Immunizations       Dose Date(s)       influenza virus vaccine, inactivated       12/02/2013, 10/14/2014, 11/02/2015       Other Immunizations               MMR (measles/mumps/rubella)       07/18/1979, 08/20/1980       IPV       08/19/1981, 07/20/1983       influenza virus vaccine, inactivated       10/07/2016, 11/02/2017, 10/04/2018, 10/28/2019       tetanus/diphth/pertuss (Tdap) adult/adol       04/17/2012       DTaP       08/19/1981, 07/20/1983       SARS-CoV-2 (COVID-19) Moderna-1273       08/19/2021, 09/23/2021

## 2022-02-16 NOTE — TELEPHONE ENCOUNTER
---------------------  From: Lali Bassett CMA   To: Gabriel Teixeira MD;     Sent: 2020 1:25:18 PM CDT  Subject: alprazolam refill     PCP:   ARACELIS    Medication:   alprazolam  Last Filled:  20    Quantity:  30  Refills:  0      Date of last office visit and reason:   20      Note:  [facility]    Pharmacy: _    Resource:   Jackie  Phone:   [phone]  ** Submitted: **  Order:ALPRAZolam (ALPRAZolam 0.5 mg oral tablet)  1 tab(s)  Oral  q6 hrs  Qty:  30 tab(s)        Refills:  0          Substitutions Allowed     PRN  for anxiety      Route To Pharmacy - [facility]    Signed by Gabriel Teixeira MD  2020 6:42:00 PM [facility]

## 2022-02-16 NOTE — TELEPHONE ENCOUNTER
---------------------  From: Malri Abebe CMA (eRx Pool ([id number]_[facility]))   To: [facility] Message Pool ([id number]_[facility]);     Sent: 7/20/2021 2:42:37 PM CDT  Subject: FW: Prescription renewal request       Last visit 6/22/21 ADHD    Comments  I started working today, is there anyway I can go back up to my regular dose of 30mg? Thanks, Jackie Salmon        ---------------------  From: JACKIE SALMON  To: [facility]  Sent: 07/19/2021 04:15 p.m. CDT  Subject: Prescription renewal request  JACKIE SALMON is requesting renewal of the prescription(s) below and has submitted the following details:  Prescription(s) to be renewed  Medication: Adderall 20 mg oral tablet  Dose: 1 tab(s)  Frequency: 2 times a day  Rx date: 06/22/2021  Prescribed by: Gabriel Abarca PA-C  Reason for renewal:   Quantity:   Medication: ALPRAZolam 0.5 mg oral tablet  Dose: 1 tab(s)  Frequency: every 6 hours as needed  Rx date: 06/22/2021  Prescribed by: Gabriel Abarca PA-C  Reason for renewal:   Quantity:   Medication: Adderall 20 mg oral tablet  Dose: 1 tab(s)  Frequency: 2 times a day  Rx date: 06/22/2021  Prescribed by: Gabriel Abarca PA-C  Reason for renewal:   Quantity:   Delivery option  Send to my pharmacy  [address] [address]  Contact Information  By Secure Message  Comments  I started working today, is there anyway I can go back up to my regular dose of 30mg? Thanks, Jackie Salmon---------------------  From: Mary Ann Jimenez LPN ([facility] Message Pool ([id number]_[facility]))   To: Gabriel Abarca PA-C;     Sent: 7/20/2021 3:45:25 PM CDT  Subject: FW: Prescription renewal request---------------------  From: Tevin GOEL, Gabriel GARCIA   To: [facility]ER[facility] [employer] Pool ([id number]_[facility]);     Sent: 7/20/2021 4:15:04 PM CDT  Subject: RE: Prescription renewal request     she has refills available on the alprazolam,  for the Adderall please try the 20 mg dose twice a  day for another monthSunday Mejia,    See Gabriel's message. Please let us know if you have further questions.    Thank you    Kesha---------------------  From: Kesha Rangel CMA ([facility] Message Pool ([id number]_[facility]))   To: HEATHER SALMON    Sent: 7/21/2021 9:20:58 AM CDT  Subject: RE: Prescription renewal request

## 2022-02-16 NOTE — LETTER
(Inserted Image. Unable to display)   [address] [place] [address]  March 29, 2021      HEATHER SALMON      [address] [place] [id number]        Dear HEATHER,    Thank you for selecting [facility] for your healthcare needs.  Below you will find the results of you recent tests done at our clinic.     Your recent drug screen is consistent with your use of Adderall and Alprazolam.  However it is also positive for marijuana use; this is a breach of the controlled substances agreement.  Continued use of marijuana or any other controlled substance not prescribed to you may result   in termination of the controlled substances agreement, and we will no longer be able to   prescribe controlled substances to you.        Result Name Current Result Reference Range   U pH  5.9 3/23/2021 4.5 - 9.0   U Creatinine (mg/dL)  172.8 3/23/2021 > or = 20.0 -    Pain Management Prescribed Drug 1  Alprazolam 3/23/2021    Pain Management Prescribed Drug 2  Adderall(TM) 3/23/2021    U Alpha-hydroxymidazolam (ng/mL)  NEGATIVE 3/23/2021  - <50   U Aminoclonazepam (ng/mL)  NEGATIVE 3/23/2021  - <25   U Aminoclonazepam medMatch  CONSISTENT 3/23/2021    U Amph Scrn (ng/mL) ((H)) >63378 3/23/2021  - <250   U Amphetamines Screen (ng/mL) ((A)) POSITIVE 3/23/2021  - <500   U Amph medMATCH  CONSISTENT 3/23/2021    U aOH Alprazolam (ng/mL) ((H)) 570 3/23/2021  - <25   U aOH Alprazolam medMATCH  CONSISTENT 3/23/2021    U Barbiturate Scrn (ng/mL)  NEGATIVE 3/23/2021  - <300   U Agata medMATCH  CONSISTENT 3/23/2021    U Benzodia Scrn (ng/mL) ((A)) POSITIVE 3/23/2021  - <100   U Cannabis Metabolite Scrn (ng/mL) ((A)) POSITIVE 3/23/2021  - <20   U Cannabis Metabolites (ng/mL) ((H)) 1,592 3/23/2021  - <5   U Cannabis Metabolites medMATCH ((A)) INCONSISTENT 3/23/2021    U Cocaine Metabolite Scrn (ng/mL)  NEGATIVE 3/23/2021  - <150   U Cocaine Metabolite medMATCH  CONSISTENT 3/23/2021    U  Hydroxyethylflurazepam (ng/mL)  NEGATIVE 3/23/2021  - <50   U Hydroxyethylflurazepam medMatch  CONSISTENT 3/23/2021    U Lorazepam (ng/mL) ((H)) 51 3/23/2021  - <50   U Lorazepam medMATCH ((A)) INCONSISTENT 3/23/2021    U medMATCH Comments  See comment 3/23/2021    U Methadone Scrn (ng/mL)  NEGATIVE 3/23/2021  - <100   U Methadone Scrn medMATCH  CONSISTENT 3/23/2021    U Methamphetamines Scrn (ng/mL)  NEGATIVE 3/23/2021  - <250   U Methamph medMATCH  CONSISTENT 3/23/2021    U Midazolam medMATCH  CONSISTENT 3/23/2021    U Nordiazepam (ng/mL)  NEGATIVE 3/23/2021  - <50   U Nordiazepam MedMATCH  CONSISTENT 3/23/2021    U Opiates Scrn (ng/mL)  NEGATIVE 3/23/2021  - <100   U Opiates medMATCH  CONSISTENT 3/23/2021    U Oxazepam (ng/mL)  NEGATIVE 3/23/2021  - <50   U Oxazepam MedMATCH  CONSISTENT 3/23/2021    U Oxidants (mcg/mL)  NEGATIVE 3/23/2021  - <200   U Oxycodone Scrn (ng/mL)  NEGATIVE 3/23/2021  - <100   U Oxycodone Scrn medMATCH  CONSISTENT 3/23/2021    U PCP Scrn (ng/mL)  NEGATIVE 3/23/2021  - <25   U PCP Scrn medMATCH  CONSISTENT 3/23/2021    U Temazepam (ng/mL)  NEGATIVE 3/23/2021  - <50   U Temazepam MedMATCH  CONSISTENT 3/23/2021    U aOH Triazolam (ng/mL)  NEGATIVE 3/23/2021  - <50   U aOH Triazolam medMATCH  CONSISTENT 3/23/2021        Please contact me or my assistant at [phone] if you have any questions.     Sincerely,        Gabriel Abarca PA-C    What do your labs mean?  Below is a glossary of commonly ordered labs:  LDL   Bad Cholesterol   HDL   Good Cholesterol  AST/ALT   Liver Function   Cr/Creatinine   Kidney Function  Microalbumin   Kidney Function  BUN   Kidney Function  PSA   Prostate    TSH   Thyroid Hormone  HgbA1c   Diabetes Test   Hgb (Hemoglobin)   Red Blood Cells

## 2022-02-16 NOTE — TELEPHONE ENCOUNTER
---------------------  From: Josefa Galindo RN (eRx Pool ([address]224_[facility]))   To: ARACELIS Message Pool ([address]_[facility])   ;     Sent: 11/10/2020 8:14:33 AM CST  Subject: FW: Prescription renewal request     Medication Refill Approval Required    PCP:   ARACELIS    Medication:   Alprazolam 0.5 mg  Last Filled:  10/14/20    Quantity:  60  Refills:  0    CSA on file?        Return to Clinic order placed?  Yes, due for ADHD visit 2021    Date of last office visit and reason:   ADHD, 2020    Date of last labs pertaining to condition:  _    Note:  Please advise on refill request. Buspirone already filled 20    Resource:   Patient portal    Phone:   _      ---------------------  From: HEATHER SALMON  To: [facility]  Sent: 11/10/2020 07:55 a.m. CST  Subject: Prescription renewal request  HEATHER SALMON is requesting renewal of the prescription(s) below and has submitted the following details:  Prescription(s) to be renewed  Medication: busPIRone 10 mg oral tablet  Dose: 1 tab(s)  Frequency: 3 times a day  Rx date: 2020  Prescribed by: Gabriel Teixeira MD  Reason for renewal:   Quantity:   Medication: ALPRAZolam 0.5 mg oral tablet  Dose: 1 tab(s)  Frequency: every 6 hours as needed  Rx date: 10/14/2020  Prescribed by: Gabriel Teixeira MD  Reason for renewal:   Quantity:   Delivery option  Send to my pharmacy  [facility] [place] [address]  Contact Information  By Secure Message---------------------  From: Lali Bassett CMA ([facility]ELIS Message [employer] ([address]_[facility])   )   To: Gabriel Teixeira MD;     Sent: 11/10/2020 8:23:35 AM CST  Subject: FW: Prescription renewal request     Pt had said that she is working extra hours and is trying to get a day off to make an appt  ** Submitted: **  Order:ALPRAZolam (ALPRAZolam 0.5 mg oral tablet)  1 tab(s)  Oral  q6 hrs  Qty:  60 tab(s)        Refills:  0          Substitutions Allowed     PRN  for anxiety       Route To Pharmacy - [facility]    Signed by Gabriel Teixeira MD  11/10/2020 2:39:00 PM [facility]

## 2022-02-16 NOTE — NURSING NOTE
CAGE Assessment Entered On:  1/30/2020 9:18 AM CST    Performed On:  1/30/2020 9:18 AM CST by Lali Bassett CMA               Assessment   Have you ever felt you should cut down on your drinking :   No   Have people annoyed you by criticizing your drinking :   No   Have you ever felt bad or guilty about your drinking :   No   Have you ever taken a drink first thing in the morning to steady your nerves or get rid of a hangover (Eye-opener) :   No   CAGE Score :   0    Lali Bassett CMA - 1/30/2020 9:18 AM CST

## 2022-02-16 NOTE — TELEPHONE ENCOUNTER
---------------------  From: Lali Bassett CMA   To: Gabriel Teixeira MD;     Sent: 2019 12:58:53 PM CDT  Subject: refill request-adderall 10 mg     PCP:   ARACELIS    Medication:   adderall 10 mg  Last Filled:   19   Quantity:  30  Refills:  0      Date of last office visit and reason:   4/15/19      Note:  pt called requesting refill    Pharmacy: [facility]    Resource:   Jackie  Phone:   [phone]  ** Submitted: **  Order:amphetamine-dextroamphetamine (Adderall 10 mg oral tablet)  1 tab(s)  Oral  qpm  Qty:  30 tab(s)        Refills:  0          Substitutions Allowed     Route To Pharmacy - [facility]    Signed by Gabriel Teixeira MD  2019 2:10:00 PM

## 2022-02-16 NOTE — TELEPHONE ENCOUNTER
---------------------  From: Lali Bassett CMA   To: Gabriel Teixeira MD;     Sent: 2019 4:30:04 PM CDT  Subject: refill request-zolpidem     PCP:   ARACELIS    Medication:   zolpidem  Last Filled:   4/15/19   Quantity:  30  Refills:  2      Date of last office visit and reason:4/15/19      Note:  pt called requesting refill    Pharmacy: [facility]    Resource:   _  Phone:   [phone]  ** Submitted: **  Order:zolpidem (zolpidem 5 mg oral tablet)  1 tab(s)  PO  Once a day (at bedtime)  Qty:  30 tab(s)        Refills:  0          Substitutions Allowed     PRN  for sleep      Route To Pharmacy - [facility]    Signed by Gabriel Teixeira MD  7/15/2019 12:38:00 PM

## 2022-02-16 NOTE — PROGRESS NOTES
Patient:   HEATHER SALMON            MRN: [id number]            FIN: [id number]               Age:   41 years     Sex:  Female     :  1980   Associated Diagnoses:   Adult ADHD   Author:   Gabriel Abarca PA-C      Chief Complaint   2021 1:20 PM CST    Pt here for and ADHD and Anxiety medcheck.        History of Present Illness   2021: here today for follow up on anxiety and ADHD, also 2nd Moderna shot  she is on 0.5 mg alprazolam tid (lorazepam was not effective for her)  and Adderall 30 mg bid  she filled her alprazolam on  and her Adderall on   JULITO-7 is 2; PHQ-9 is 2  she is having trouble sleeping, is off of zolpidem  she has only had 1 cigarette today     2021:  here today for ADHD and anxiety medcheck  past few week s has been stressful  still taking 4 alprazolam daily (one in morning, one at lunch, and 2 qhs)  filled her Adderall yesterday for a month  JULITO-7 is 4, PHQ-9 is 1  she is down to 2 cigs/day              Review of Systems   Constitutional:  Negative.    Cardiovascular:  Negative.    Gastrointestinal:  Negative.    Musculoskeletal:  Negative.    Neurologic:  Negative.    Psychiatric:  Anxiety, No depression.       Health Status   Allergies:    Allergic Reactions (Selected)  Severity Not Documented  Concerta (No reactions were documented)  Glutens (No reactions were documented)  Milk Products (No reactions were documented)   Problem list:    All Problems  Tobacco user / SNOMED CT 257665302 / Probable  ADHD / SNOMED CT 9712337671 / Confirmed  ADHD - Attention deficit disorder with hyperactivity / SNOMED CT 9936595888 / Confirmed  Insomnia / SNOMED CT 177548309 / Confirmed  Panic attacks / SNOMED CT 212713600 / Confirmed  Generalized anxiety disorder / SNOMED CT 87891633 / Confirmed  Low back pain / SNOMED CT 839189265 / Confirmed  JULITO - Generalized anxiety disorder / SNOMED CT 791972405 / Confirmed  Migraine / SNOMED CT 53042806 / Confirmed  Hip pain, bilateral /  SNOMED CT 78777846 / Confirmed   Medications:  (Selected)   Prescriptions  Prescribed  ALPRAZolam 0.5 mg oral tablet: See Instructions, Instructions: 1 tab(s) Oral qam and in afternoon, 2 tabs qhs  fill on or after 10/8/2021, PRN: for anxiety, # 120 EA, 2 Refill(s), Type: Soft Stop, Pharmacy: [facility] #[id number], 1 tab(s) Oral qam and in afternoon, 2 tabs qhs...  Adderall 30 mg oral tablet: = 1 tab(s) ( 30 mg ), Oral, bid, Instructions: fill on or after 10/8/2021, # 60 tab(s), 0 Refill(s), Type: Maintenance, Pharmacy: [facility] #[id number], 1 tab(s) Oral bid,Instr:fill on or after 10/8/2021, 66, in, 21 16:28:00 CDT, Height Me...  Adderall 30 mg oral tablet: = 1 tab(s) ( 30 mg ), Oral, bid, Instructions: fill on or after 2021, # 60 tab(s), 0 Refill(s), Type: Maintenance, Pharmacy: [facility] #[id number], 1 tab(s) Oral bid,Instr:fill on or after 2021, 66, in, 10/09/21 10:45:00 CDT, Height Me...  Imitrex 50 mg oral tablet: = 1 tab(s) ( 50 mg ), Oral, once, Instructions: may repeat dose in 2 hours if needed, PRN: for migraine headache, # 9 tab(s), 1 Refill(s), Type: Soft Stop, Pharmacy: [facility], 1 tab(s) Oral once,PRN:for migraine headache,Instr:may repeat dos...      Histories   Past Medical History:    Active  ADHD - Attention deficit disorder with hyperactivity (4171228986)  JULITO - Generalized anxiety disorder (555391689)  Low back pain (415105279)  Hip pain, bilateral (04668899)   Family History:    Attention deficit disorder  Brother  Anxiety  Father  Hypertension  Father  Heart disease  Grandfather (M)  Pancreatic cancer  Grandfather (M)  Migraine  Mother  Lyme disease  Sister  Anxiety  Father  Colon cancer  Grandmother (P) ()  Comments:  10/29/2018 12:11 PM [facility] - Tiffany Connolly  70s  ADHD (attention deficit hyperactivity disorder)....  Brother  Insomnia  Mother     Procedure history:    Cyst of breast (9037514519) on 2014 at 34 Years.  Comments:  10/29/2018  "12:08 PM Tiffany Hawthorne  Right  Biopsy ([id number]) on 7/18/2014 at 34 Years.  Comments:  10/29/2018 11:54 AM Tiffany Hawthorne  Benign fibroadipose tissue.    10/29/2018 11:52 AM Tiffany Hawthorne  Needle biopsy of right breast.  Caesarean section (36836797).  Tubal ligation (785940476).  Tooth extraction, multiple (42511319).   Social History:        Electronic Cigarette/Vaping Assessment            Electronic Cigarette Use: Never.      Alcohol Assessment            Past      Tobacco Assessment            4 or less cigarettes(less than 1/4 pack)/day in last 30 days                     Comments:                      06/22/2021 - Jose Daniel Plunkett CMA                     Pt states she is \"down to 2 cigarettes a day\"      Substance Abuse Assessment            Never      Employment and Education Assessment            Employed, Work/School description: dental assistant, small business owner.      Home and Environment Assessment            Marital status: .  Spouse/Partner name: Jose Miguel.  Lives with Self, Children.  1 children.      Nutrition and Health Assessment            Diet restrictions: dairy.      Exercise and Physical Activity Assessment            Exercise frequency: 3-4 times/week.        Physical Examination   Vital Signs   12/8/2021 1:20 PM CST Temperature Tympanic 98.8 DegF    Peripheral Pulse Rate 80 bpm    Respiratory Rate 16 br/min    Systolic Blood Pressure 134 mmHg  HI    Diastolic Blood Pressure 78 mmHg    Mean Arterial Pressure 97 mmHg    BP Site Right arm      Measurements from flowsheet : Measurements   12/8/2021 1:20 PM CST Height Measured - Standard 66 in    Weight Measured - Standard 135 lb    BSA 1.69 m2    Body Mass Index 21.79 kg/m2      General:  No acute distress.    Respiratory:  Lungs are clear to auscultation.    Cardiovascular:  Normal rate, Regular rhythm, No murmur.    Psychiatric:  Cooperative, Appropriate mood & affect.       Impression and Plan   Diagnosis     Adult ADHD " (FHZ70-AC F90.0).     Course:  Progressing as expected, Well controlled.    Plan:  continue current medication, given Adderall Rx for 2 months, will call for the 3rd and 4th months, will be seen every 120 days  renewed her alprazolam for 4 months  [place] PDMP consulted, no irregularities noted       she has used more alprazolam this month than normal due to increased stress, she cannot get her regular Rx until 12/14  will give her a separate Rx for one week.    Orders     Orders   Pharmacy:  Adderall 30 mg oral tablet (Prescribe): = 1 tab(s) ( 30 mg ), Oral, bid, Instructions: fill on or after 01/05/2022, # 60 tab(s), 0 Refill(s), Type: Maintenance, Pharmacy: [facility] #[id number], 1 tab(s) Oral bid,Instr:fill on or after 01/05/2022, 66, in, 12/8/2021 1:20 PM CST, Height Measured, 135, lb, 12/8/2021 1:20 PM CST, Weight Measured  ALPRAZolam 0.5 mg oral tablet (Modify): See Instructions, Instructions: 1 tab(s) Oral qam and in afternoon, 2 tabs qhs  fill on or after 10/8/2021, PRN: for anxiety, # 120 EA, 2 Refill(s), Type: Hard Stop, Pharmacy: [facility] #[id number], 66, in, 09/23/21 16:28:00 CDT, Height Measured, 120, lb, 09/23/21 16:28:00 CDT, Weight Measured  ALPRAZolam 0.5 mg oral tablet (Prescribe): See Instructions, Instructions: 1 tab(s) Oral qam and in afternoon, 2 tabs qhs, PRN: for anxiety, # 120 EA, 3 Refill(s), Type: Soft Stop, Pharmacy: [facility] #[id number], 1 tab(s) Oral qam and in afternoon, 2 tabs qhs,PRN:for anxiety, 66, in, 12/8/2021 1:20 PM CST, Height Measured, 135, lb, 12/8/2021 1:20 PM CST, Weight Measured  Adderall 30 mg oral tablet (Modify): = 1 tab(s) ( 30 mg ), Oral, bid, Instructions: fill on or after 10/8/2021, # 60 tab(s), 0 Refill(s), Type: Hard Stop, Pharmacy: [facility] #[id number], 66, in, 09/23/21 16:28:00 CDT, Height Measured, 120, lb, 09/23/21 16:28:00 CDT, Weight Measured.     Orders   Pharmacy:  ALPRAZolam 0.5 mg oral tablet (Prescribe): See Instructions,  Instructions: 1 tab(s) po qam and at lunch, 2 po qhs, PRN: for anxiety, # 30 tab(s), 0 Refill(s), Type: Maintenance.     Orders   Charges (Evaluation and Management):  07082 office o/p est low 20-29 min (Charge) (Order): Quantity: 1, JULITO - Generalized anxiety disorder  Adult ADHD.     Summary

## 2022-02-16 NOTE — LETTER
(Inserted Image. Unable to display)       October 31, 2019      HEATHER SALMON  [address] [place], [place] [id number]          Dear HEATHER,      Thank you for selecting [facility] for your healthcare needs.     Our records indicate you are due for the following services:     Follow-up Office Visit    To schedule an appointment or if you have further questions, please contact your primary clinic:   [facility]       [phone]   [facility]       [phone]              [facility]     [phone]    Powered by [employer]    Sincerely,    Gabriel Teixeira MD

## 2022-02-16 NOTE — TELEPHONE ENCOUNTER
"---------------------  From: Mateo HARPER, Josefa RICHARDS (Phone Messages Pool ([id number]))   To: [facility] Message Pool ([address] [place] [address]);     Sent: 4/7/2021 3:45:19 PM CDT  Subject: Question about result letter     Phone message    PCP:   Last saw [facility]      Time of Call:  1540       Person Calling:  Pt  Phone number:  [phone], [place] to [facility]    Returned call at: _    Note:   Pt called because she got her result letter stating she may be terminated from controlled substance agreement and not be able to get Adderall or Alprazolam here any more.    Pt states she uses CBD sometimes for her anxiety and is wondering what she has to do to NOT be terminated from agreement.    Please advise.    Last office visit and reason:  ADHD/Anxiety/Panic Attacks, 3/23/21---------------------  From: Jose Daniel Plunkett CMA ([facility] Message Pool ([facility]))   To: Gabriel Abarca PA-C;     Sent: 4/7/2021 4:00:06 PM CDT  Subject: FW: Question about result letter---------------------  From: Gabriel Abarca PA-C   To: [facility] Message Pool ([id number]);     Sent: 4/7/2021 4:13:13 PM CDT  Subject: RE: Question about result letter     Per the controlled substance agreement, she needs to stop using marijuana, CBD or any controlled  substance not prescribed by the clinicI called pt and gave her [facility] message.  Pt states she will stop using CBD \"immediately\"  [phone]  Jose Daniel Plunkett CMA  "

## 2022-02-16 NOTE — TELEPHONE ENCOUNTER
---------------------  From: Lali Bassett CMA (eRx Pool ([address]))   To: Gabriel Teixeira MD;     Sent: 9/21/2020 9:26:02 AM CDT  Subject: FW: Medication Management   Due Date/Time: 9/19/2020 1:13:00 PM CDT             ** Patient matched by Lali Bassett CMA on 9/21/2020 9:25:51 AM CDT **      ------------------------------------------  From: [facility]  To: Gabriel Teixeira MD  Sent: September 18, 2020 1:13:11 PM CDT  Subject: Medication Management  Due: September 9, 2020 4:20:39 PM CDT     ** On Hold Pending Signature **     Drug: amphetamine-dextroamphetamine (amphetamine-dextroamphetamine 30 mg oral capsule, extended release), ONE (1) CAP(S) ORAL EVERY MORNING  Quantity: 30 cap(s)  Days Supply: 30  Refills: 0  Substitutions Allowed  Notes from Pharmacy:     Dispensed Drug: amphetamine-dextroamphetamine (amphetamine-dextroamphetamine 30 mg oral capsule, extended release), ONE (1) CAP(S) ORAL EVERY MORNING  Quantity: 30 cap(s)  Days Supply: 30  Refills: 0  Substitutions Allowed  Notes from Pharmacy:     ** On Hold Pending Signature **     Drug: amphetamine-dextroamphetamine (amphetamine-dextroamphetamine 10 mg oral tablet), ONE (1) TAB(S) ORAL EVERY EVENING  Quantity: 30 tab(s)  Days Supply: 30  Refills: 0  Substitutions Allowed  Notes from Pharmacy:     Dispensed Drug: amphetamine-dextroamphetamine (amphetamine-dextroamphetamine 10 mg oral tablet), ONE (1) TAB(S) ORAL EVERY EVENING  Quantity: 30 tab(s)  Days Supply: 30  Refills: 0  Substitutions Allowed  Notes from Pharmacy:     ** On Hold Pending Signature **     Drug: zolpidem (zolpidem 5 mg oral tablet), TAKE ONE (1) TABLET EACH NIGHT AT BEDTIME AS NEEDED FOR SLEEP  Quantity: 30 tab(s)  Days Supply: 30  Refills: 0  Substitutions Allowed  Notes from Pharmacy:     Dispensed Drug: zolpidem (zolpidem 5 mg oral tablet), TAKE ONE (1) TABLET EACH NIGHT AT BEDTIME AS NEEDED FOR SLEEP  Quantity: 30 tab(s)  Days Supply: 30  Refills:  0  Substitutions Allowed  Notes from Pharmacy:  ------------------------------------------  ** Submitted: **  Order:amphetamine-dextroamphetamine (Adderall 10 mg oral tablet)  1 tab(s)  Oral  qpm  Qty:  30 tab(s)        Refills:  0          Substitutions Allowed     Route To [facility]    Signed by Gabriel Teixeira MD  9/21/2020 5:47:00 PM [facility]    ** Submitted: **  Order:amphetamine-dextroamphetamine (Adderall XR 30 mg oral capsule, extended release)  1 cap(s)  Oral  qam  Qty:  30 cap(s)        Refills:  0          Substitutions Allowed     Route To [facility]    Signed by Gabriel Teixeira MD  9/21/2020 5:47:00 PM [facility]

## 2022-02-16 NOTE — TELEPHONE ENCOUNTER
---------------------  From: Diya Allison   To: Lali Bassett CMA;     Sent: 3/23/2020 9:52:03 AM CDT  Subject: General Message     Requesting refill n prescriptions to [facility].---------------------  From: Lali Bassett CMA   To: Gabriel Teixeira MD;     Sent: 3/23/2020 10:02:11 AM CDT  Subject: FW: General Message     requesting refill of adderall and zolpidem---------------------  From: Gabriel Teixeira MD   To: Lali Bassett CMA;     Sent: 3/23/2020 11:52:50 AM CDT  Subject: RE: General Message     eRx sent

## 2022-02-16 NOTE — TELEPHONE ENCOUNTER
---------------------  From: Lali Bassett CMA   To: Gabriel Teixeira MD;     Sent: 2020 11:03:30 AM CDT  Subject: alprazolam refill     PCP:   ARACELIS    Medication:   alprazolam  Last Filled:  20    Quantity:  30  Refills:  0      Date of last office visit and reason:   20      Note:  [facility]    Pharmacy: _    Resource:   [facility]  Phone:   [phone]  ** Submitted: **  Order:ALPRAZolam (ALPRAZolam 0.5 mg oral tablet)  1 tab(s)  Oral  q6 hrs  Qty:  30 tab(s)        Refills:  0          Substitutions Allowed     PRN  for anxiety      Route To Pharmacy - [facility]    Signed by Gabriel Teixeira MD  2020 6:38:00 PM

## 2022-02-16 NOTE — PROGRESS NOTES
Patient:   HEATHER SALMON            MRN: [id number]            FIN: [id number]               Age:   38 years     Sex:  Female     :  1980   Associated Diagnoses:   Generalized anxiety disorder   Author:   Gabriel Teixeira MD      Impression and Plan   Diagnosis     Generalized anxiety disorder ([id number] F41.1).     Course:  Worsening.    Plan:  Discontinue Sertraline because it is causing headaches.    Orders     Orders   Charges (Evaluation and Management):  77481 office outpatient visit 15 minutes (Charge) (Order): Quantity: 1, Generalized anxiety disorder.     Orders (Selected)   Prescriptions  Prescribed  amitriptyline 50 mg oral tablet: = 1 tab(s) ( 50 mg ), PO, q12 hrs, Instructions: Do NOT take along with Zolpidem, PRN: for anxiety, # 60 tab(s), 1 Refill(s), Type: Maintenance, Pharmacy: [facility], 1 tab(s) Oral q12 hrs,PRN:for anxiety,Instr:Do NOT take along with Zolpidem.        Visit Information      Date of Service: 04/15/2019 04:10 pm  Performing Location: [facility]  Encounter#: [id number]      Primary Care Provider (PCP):  NONE ,    Visit type:  Scheduled follow-up.    Accompanied by:  No one.    Source of history:  Self.    Referral source:  Self.    History limitation:  None.       Chief Complaint   4/15/2019 4:14 PM CDT    Pt here for med ck        History of Present Illness             The patient presents with anxiety.  The anxiety is characterized by nervousness and restlessness.  The severity of the anxiety is moderate.  The anxiety is episodic and fluctuates in intensity.  The context of the anxiety: occurred associated with menstral cycle.  Exacerbating factors consist of none.  Relieving factors consist of medication.  Associated symptoms consist of none.        Review of Systems   Constitutional:  Negative.    Eye:  Negative.    Ear/Nose/Mouth/Throat:  Negative.    Respiratory:  Negative.    Cardiovascular:  Negative.    Gastrointestinal:  Negative.     Genitourinary:  Negative.    Hematology/Lymphatics:  Negative.    Endocrine:  Negative.    Immunologic:  Negative.    Musculoskeletal:  Negative.    Integumentary:  Negative.    Neurologic:  Negative.    Psychiatric:  Anxiety.    All other systems reviewed and negative      Health Status   Allergies:    Allergic Reactions (Selected)  Severity Not Documented  Concerta (No reactions were documented)  Glutens (No reactions were documented)  Milk Products (No reactions were documented)   Medications:  (Selected)   Prescriptions  Prescribed  Adderall 10 mg oral tablet: = 1 tab(s) ( 10 mg ), Oral, qpm, # 30 tab(s), 0 Refill(s), Type: Maintenance, Pharmacy: [facility], 1 tab(s) Oral qpm  Adderall XR 30 mg oral capsule, extended release: = 1 cap(s) ( 30 mg ), Oral, qam, # 30 cap(s), 0 Refill(s), Type: Maintenance, Pharmacy: [facility], 1 cap(s) Oral qam  amitriptyline 50 mg oral tablet: = 1 tab(s) ( 50 mg ), PO, q12 hrs, Instructions: Do NOT take along with Zolpidem, PRN: for anxiety, # 60 tab(s), 1 Refill(s), Type: Maintenance, Pharmacy: [facility], 1 tab(s) Oral q12 hrs,PRN:for anxiety,Instr:Do NOT take along with Zolpidem  zolpidem 5 mg oral tablet: = 1 tab(s) ( 5 mg ), PO, Once a day (at bedtime), PRN: for sleep, # 30 tab(s), 2 Refill(s), Type: Maintenance, Pharmacy: [facility], 1 tab(s) Oral hs,PRN:for sleep   Problem list:    All Problems  ADHD / SNOMED CT 4540566656 / Confirmed  ADHD - Attention deficit disorder with hyperactivity / SNOMED CT 7439815965 / Confirmed  JULITO - Generalized anxiety disorder / SNOMED CT 476928230 / Confirmed  Generalized anxiety disorder / SNOMED CT 82503366 / Confirmed  Hip pain, bilateral / SNOMED CT 03904988 / Confirmed  Insomnia / SNOMED CT 888992225 / Confirmed  Low back pain / SNOMED CT 806538124 / Confirmed      Histories   Past Medical History:    Active  ADHD - Attention deficit disorder with hyperactivity (9668149858)  JULITO - Generalized anxiety  disorder (680691092)  Low back pain (370117362)  Hip pain, bilateral (74593595)   Family History:    Attention deficit disorder  Brother  Anxiety  Father  Hypertension  Father  Heart disease  Grandfather (M)  Pancreatic cancer  Grandfather (M)  Migraine  Mother  Lyme disease  Sister  Anxiety  Father  Colon cancer  Grandmother (P) ()  Comments:  10/29/2018 12:11 PM REYNAT Tiffany Slater  70s  ADHD (attention deficit hyperactivity disorder)....  Brother  Insomnia  Mother     Procedure history:    Cyst of breast (SNOMED CT 3263196996) on 2014 at 34 Years.  Comments:  10/29/2018 12:08 PM REYNAT - Tiffany Connolly  Right  Biopsy (SNOMED CT 696760308) on 2014 at 34 Years.  Comments:  10/29/2018 11:54 AM Tiffany Hawthoren  Benign fibroadipose tissue.    10/29/2018 11:52 AM Tiffany Hawthorne  Needle biopsy of right breast.  Caesarean section (SNOMED CT 73933977).  Tubal ligation (SNOMED CT 252491943).  Tooth extraction, multiple (SNOMED CT 05634447).      Physical Examination   Vital Signs   4/15/2019 4:14 PM CDT Peripheral Pulse Rate 85 bpm    Systolic Blood Pressure 130 mmHg    Diastolic Blood Pressure 82 mmHg  HI    Mean Arterial Pressure 98 mmHg    Oxygen Saturation 99 %      Measurements from flowsheet : Measurements   4/15/2019 4:14 PM CDT Height Measured - Standard 66 in    Weight Measured - Standard 172 lb    BSA 1.9 m2    Body Mass Index 27.76 kg/m2  HI      General:  Alert and oriented X 3, No acute distress, Warm, Pink, Intact.         Appearance: Within normal limits, Well nourished, Calm.         Hydration: Within normal limits.         Psych: Within normal limits, Appropriate mood and affect, Cooperative, Normal judgment.    Psychiatric:  Cooperative, Non-suicidal.         Mood and affect: Calm.         Behavior: Relaxed.         Judgment: Able to make sensible decisions, Appropriate in social situations.         Thought process: Appropriate.

## 2022-02-16 NOTE — TELEPHONE ENCOUNTER
---------------------  From: Vicky Christopher LPN (Phone Messages Pool ([id number]_[facility]))   To: HEATHER SALMON    Sent: 8/16/2021 5:13:58 PM CDT  Subject: FW: Anxiety     Good Afternoon Gabriel Mejia is out of clinic for today but will be back tomorrow morning at 7:00am. You message has been forwarded to his assistant. If you feel that this can not wait until then please contact us back and we can have an on call provided help assist.  Kind Regards,   Vicky IRAHETA      ---------------------  From: HEATHER SALMON  To: [facility]  Sent: 08/16/2021 04:58 p.m. CDT  Subject: Anxiety  So I did 20mg adhd meds & 1 lorazepam this morning. It does not work very good for me. It does not stop the hand tremor & I still had anxiety. The only thing that works is the alprazolam for anxiety / panic attacks. I would not need to take it as much if they legalized medical Marijuana. With that being said.. I really don t know what to say anymore. It isn t easy switching meds all the time.  Feeling like this all the time is not fun. Please contact me as soon as you can.    Thanks,  Heather Salmon

## 2022-02-16 NOTE — TELEPHONE ENCOUNTER
---------------------  From: Zenaida Arredondo LPN (Phone Messages Pool (32224_[facility]))   Sent: 2020 11:11:02 AM CST  Subject: Adderall refill     Phone Message    PCP:   none      Time of Call:  10:45am       Person Calling:  pt  Phone number:  [phone] OK to LM     Returned call at: 11:08am    Note:   Pt LM requesting refills of Adderall 30mg and Adderall 10mg.    Pt was last seen for ADHD on 19  CSA signed 18 to be seen q 90 days- .    Pt very overdue for appt.     Returned call and informed pt she needs to be seen being she has not been in since July for ADHD. Pt transferred to scheduling.    Last office visit and reason:  19 Anxiety   Discharge Information    IV Discontiued Time:  NA    Amount of Fluid Infused:  NA    Discharge Criteria = When patient returns to baseline or as per MD order    Consciousness:  Pt is fully awake    Circulation:  BP +/- 20% of pre-procedure level    Respiration:  Patient is able to breathe deeply    O2 Sat:  Patient is able to maintain O2 Sat >92% on room air    Activity:  Moves 4 extremities on command    Ambulation:  Patient is able to stand and walk or stand and pivot into wheelchair    Dressing:  Clean/dry or No Dressing    Notes:   Discharge instructions and AVS given to patient    Patient meets criteria for discharge?  YES    Admitted to PCU?  No    Responsible adult present to accompany patient home?  Yes    Signature/Title:    Cyndi Mckee RN Care Coordinator  [facility] [place]

## 2022-02-16 NOTE — TELEPHONE ENCOUNTER
---------------------  From: Gladys/Angela OJEDA   To: Brandan Rodas MD;     Sent: 9/18/2020 1:27:47 PM CDT  Subject: Med refills      Phone Message    PCP: Richie     Time of Call: 1059    Phone Number: [phone]    Note: Patient called stating that this is the second time she has called requesting refills. Patient is needing a refill of Adderall 10mg and 30mg and Zolpidem. Please advise on refill     Pharmacy: [facility]

## 2022-02-16 NOTE — TELEPHONE ENCOUNTER
"---------------------  From: Josefa Galindo RN (eRx Pool (32224_[facility]))   To: [employer] ([id number]_[facility]);     Sent: 12/7/2021 10:11:24 AM CST  Subject: FW: Prescription renewal request     Medication Refill Approval Required    PCP:   DERIK    Medication:   Adderall  Last Filled:  On or after 11/6    Quantity:  60  Refills:  0    CSA on file?   Yes     Return to Clinic order placed?  Yes, due for \"anxiety med check\" now    Date of last office visit and reason:   10/9/21, anxiety disorder    Note:  Last ADHD visit 9/23/21. Please advise on med refill.    Resource:   eRx pool patient portal message    Phone:   _      ---------------------  From: HEATHER SALMON  To: [facility]  Sent: 12/07/2021 06:12 a.m. CST  Subject: Prescription renewal request  HEATHER SALMON is requesting renewal of the prescription(s) below and has submitted the following details:  Prescription(s) to be renewed  Medication: Adderall 30 mg oral tablet  Dose: 1 tab(s)  Frequency: 2 times a day  Rx date: 09/23/2021  Prescribed by: Gabriel Abarca PA-C  Reason for renewal:   Quantity:   Delivery option  [place] [address]  Phone: [phone]  Contact Information  By Secure Message---------------------  From: Jose Daniel Plunkett CMA ([facility] Message Pool (32224_[facility]))   To: Gabriel Abarca PA-C;     Sent: 12/7/2021 10:23:53 AM CST  Subject: FW: Prescription renewal request---------------------  From: Gabriel Abraca PA-C   To: [employer] ([id number]_[facility]);     Sent: 12/7/2021 10:44:28 AM CST  Subject: RE: Prescription renewal request     Adderall is filled for a month, needs visit for further refills  [place] PDMP consulted, no irregularities notedShashi Mejia did fill your medication again but you will need a visit before your next refill.  Thank you and have a nice day Jose Daniel Plunkett CMA---------------------  From: Jose Daniel Plunkett CMA (SUMANTH " Message Pool (32224_[facility])   To: HEATHER SALMON    Sent: 12/7/2021 11:04:35 AM CST  Subject: FW: Prescription renewal request

## 2022-02-16 NOTE — TELEPHONE ENCOUNTER
---------------------  From: Sanjana Lowe RN (Phone Messages Pool (66730[facility]))   To: [facility] Message Pool ([id number]);     Sent: 6/18/2021 11:52:05 AM CDT  Subject: Phone Message - Rx questions     Phone Message    PCP:   DERIK      Time of Call:  1145       Person Calling:  Pt  Phone number:  [phone]    Returned call at: _    Note:   Patient called with concerns about Adderall prescription. She states she was taking 30mg XR but was having reaction. She tried taking 10mg TID but states she could not focus at all. She wonders if she should be taking 3 tablets in the morning and 3 in the afternoon. Informed her this is a much higher mg than she would have been taking previously. Did discuss that SUMANTH wanted to follow up with her in 10 days when done with this supply of 10mg Adderall. She requests that message be forwarded on to [facility]. Patient was jumbled with thoughts on dosage. Advised she continue to take Adderall as directed by [facility].Shashi, Please advise.  Jose Daniel Plunkett CMA---------------------  From: Jose Daniel Plunkett CMA ([facility] Message Pool (300[address]))   To: Gabriel Abarca PA-C;     Sent: 6/18/2021 11:53:35 AM CDT  Subject: FW: Phone Message - Rx questions---------------------  From: Gabriel Abarca PA-C   To: [facility] Message Pool ([address]);     Sent: 6/18/2021 12:01:23 PM CDT  Subject: RE: Phone Message - Rx questions     As we discussed previously, because she was having a possible reaction to the 30 mg XR,  she should trial 10 mg tid for the next 10 days (she would still be getting 30 mg of Adderall daily)  Then she should return for an in clinic visit to discuss how to proceed.I called pt and left DERIK message on her personal voicemail.  I reiterated again the importance of her taking her adderall as instructed by [facility] and that she is NOT to increase dose to 30 mg BID.  Pt needs to FU in clinic for further refills per [facility]. [phone]  Jose Daniel Plunkett CMA

## 2022-02-16 NOTE — TELEPHONE ENCOUNTER
---------------------  From: Harriet Guillory MA (Phone Messages Pool ([id number]_[facility]))   To: Gabriel Teixeira MD; JACKIE SALMON    Sent: 2020 4:58:17 PM CDT  Subject: FW: Anxiety..     Sunday Mejia,     Dr Teixeira will be back in on Monday and get your message then, I sent it on to him.     Have a good weekend,   Yeni       ---------------------  From: JACKIE SALMON  To: [facility]  Sent: 2020 02:00 p.m. CDT  Subject: Anxiety..  Sorry to bother you, I m having a difficult time with my anxiety due to a few situations at home. Is there anything the  could recommend for me? I understand about not refilling the normal med. so soon. I just could use some help & I m open to try anything.  Thanks,  Jackie Salmon---------------------  From: Gabriel Teixeira MD   To: Phone Messages Pool (97224_[facility]);     Cc: Lali Bassett CMA;      Sent: 8/10/2020 8:43:45 AM CDT  Subject: RE: Anxiety..     Please tell Jackie that I have sent a prescription for Buspirone to  that she should take in addition to the Alprazolam.

## 2022-02-16 NOTE — TELEPHONE ENCOUNTER
---------------------  From: Lali Bassett CMA   To: Gabriel Teixeira MD;     Sent: 2/19/2019 11:16:26 AM CST  Subject: anxiety     Pt called to say that she has had some bouts of anxiety and need to leave work yesterday because of it.  She is wondering if she can get something to help with this.  She has noticed that it happens more around her period time.  At last appt, ARACELIS had wanted her to increase sertraline to 50 mg but she was getting HA's so she has backed down to 25 mg.    Please advise      Jackie  [phone]---------------------  From: Gabriel Teixeira MD   To: Lali Bassett CMA;     Sent: 2/19/2019 12:08:29 PM CST  Subject: RE: anxiety     eRx SVDPt contacted that rx is ready for  at the pharmacy

## 2022-02-16 NOTE — TELEPHONE ENCOUNTER
---------------------  From: Lali Bassett CMA   To: Gabriel Teixeira MD;     Sent: 4/15/2019 11:14:13 AM CDT  Subject: refill request-zolpidem     PCP:   ARACELIS    Medication:   zolpidem  Last Filled:  1/15/19    Quantity:  30  Refills:  2      Date of last office visit and reason:   18      Note:  pt called requesting refill    Pharmacy: [facility]    Resource:   _  Phone:   [phone]  ** Submitted: **  Order:zolpidem (zolpidem 5 mg oral tablet)  1 tab(s)  PO  Once a day (at bedtime)  Qty:  30 tab(s)        Refills:  2          Substitutions Allowed     PRN  for sleep      Route To Pharmacy - [facility]    Signed by Gabriel Teixeira MD  4/15/2019 11:53:00 AM

## 2022-02-16 NOTE — TELEPHONE ENCOUNTER
---------------------  From: Lali Bassett CMA   To: Gabriel Teixeira MD;     Sent: 2020 11:26:43 AM CST  Subject: refill request-adderall 10 and 30 mg     PCP:   ARACELIS    Medication:    adderall 10mg    Medication:    adderall 30 mg     Last Filled:     19    Last Filled:   19          Date of last office visit and reason:   anxiety 19...pt has appt made for this Thursday morning for med ck      Note:     Pharmacy:     Resource:   Jackie  Phone:  [phone]

## 2022-02-16 NOTE — TELEPHONE ENCOUNTER
---------------------  From: Lali Bassett CMA   To: Gabriel Teixeira MD;     Sent: 5/2/2019 9:42:06 AM CDT  Subject: General Message     Phone Message    PCP:   ARACELIS      Time of Call:  _       Person Calling:  Jackie  Phone number:       Returned call at: _    Note:   Pt called to say that when she was in recently, she was given something for her anxiety and she thought that is was working but she is still having 3-4 anxiety attacks per month and is wondering if something can be prescribed to help her get through those attacks when they happen    Last office visit and reason:  _    Transferred to: ARACELIS---------------------  From: Gabriel Teixeira MD   To: Lali Bassett CMA;     Sent: 5/2/2019 11:42:29 AM CDT  Subject: RE: General Message     Please tell her to increase the Amitriptyline to 75 mg BID (1.5 tabs)Message left via secure voicemail with ARACELIS's instructions

## 2022-02-16 NOTE — TELEPHONE ENCOUNTER
---------------------  From: Lali Bassett CMA   To: Gabriel Teixeira MD;     Sent: 2019 4:44:22 PM CST  Subject: refill request-adderall 10 mg     PCP:   ARACELIS    Medication:   adderall 10 mg  Last Filled:  19    Quantity:  30  Refills:  0      Date of last office visit and reason:   18      Note:  Pt called requesting refill    Pharmacy: [facility]    Resource:   Jackie  Phone:   [phone]  ** Submitted: **  Order:amphetamine-dextroamphetamine (Adderall 10 mg oral tablet)  1 tab(s)  Oral  qpm  Qty:  30 tab(s)        Refills:  0          Substitutions Allowed     Route To Pharmacy - [facility]    Signed by Gabriel Teixeira MD  2019 10:42:00 AM
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
## 2022-02-16 NOTE — TELEPHONE ENCOUNTER
Entered by Jose Daniel Plunkett CMA on August 05, 2021 4:33:04 PM CDT  Shashi, Please advise.  Jose Daniel Plunkett CMA      ---------------------  From: HEATHER SALMON  To: [employer] (32224_[facility])  Sent: 08/05/2021 04:22 p.m. CDT  Subject: RE: FW: Anxiety & Headaches  Ok, I ll call and make an appt. next week. I also need to set one up for my son. My headache finally went away last night! Can I get an anxiety refill to hold me over until then? Ill call and make the appt. right away.    Thanks,  Heather Salmon       Addendum by Jose Daniel Plunkett CMA on August 04, 2021 5:19:14 PM CDT  ---------------------  From: Jose Daniel Plunkett CMA ([facility] Message Pool (32224_[facility]))  To: HEATHER SALMON  Sent: 8/4/2021 5:19:14 PM CDT  Subject: FW: Anxiety & Headaches       Addendum by Jose Daniel Plunkett CMA on August 04, 2021 5:19:08 PM CDT  Shashi Mejia would like you to schedule a visit to further discuss these issues.  He did say a telephone visit would be ok.  Thank you Jose Daniel Plunkett CMA       Addendum by Gabriel Abarca PA-C on August 04, 2021 4:53:19 PM CDT  ---------------------  From: Gabriel Abarca PA-C  To: [employer] (32224_[facility]);  Sent: 8/4/2021 4:53:19 PM CDT  Subject: RE: Anxiety & Headaches       she should schedule a clinic visit (telemedicine is okay) to discuss these issues       Addendum by Jose Daniel Plunkett CMA on August 04, 2021 4:34:33 PM CDT  ---------------------  From: Jose Daniel Plunkett CMA ([facility] Message Pool [id number]_[facility]))  To: Gabriel Abarca PA-C;  Sent: 8/4/2021 4:34:33 PM CDT  Subject: FW: Anxiety & Headaches  ---------------------  From: Lali Bassett CMA ([employer] Messages Pool (47124_[facility]))  To: [employer] Message Pool (66324_[facility]);  Sent: 8/4/2021 4:32:17 PM CDT  Subject: FW: Anxiety & Headaches                      ---------------------  From: HEATHER SALMON  To: [facility]  Sent: 08/04/2021 04:29 p.m. CDT  Subject:  Anxiety & Headaches  I m still having a hard time with my anxiety I m sure I can quit my cig. habit fast if I could possibly get a higher dose? I would cut them in half if I didn t need a full dose. I only have 8 left. 2 days worth.  Also... I get sever headaches when it gets very humid out. I ve tried everything over the counter.  I didn t get them for awhile but with that being said I ve had one for 3 days straight. I don t know if its tension or what but is there anything to help with the pain?  Trust me, I drink ALOT of water so I know its not that...  Its hard to work all day when you cant sleep due to headaches.  And thank you for taking care of the other situation at the pharmacy! If you do choose to help me out with some meds. I will give [facility] one more try.  Thanks for your time and patience,  Heather Salmon---------------------  From: Jose Daniel Plunkett CMA ([employer])   To: Gabriel Abarca PA-C;     Sent: 8/5/2021 4:33:10 PM CDT  Subject: FW: FW: Anxiety & Headaches---------------------  From: Gabriel Abarca PA-C   To: [employer] (32224_[facility]);     Sent: 8/5/2021 4:55:31 PM CDT  Subject: RE: FW: Anxiety & Headaches     I sent in #24, no refills---------------------  From: Jose Daniel Plunkett CMA ([employer] Pool ([facility]   To: HEATHER SALMON    Sent: 8/5/2021 4:56:15 PM CDT  Subject: FW: FW: Anxiety & Headaches

## 2022-02-16 NOTE — NURSING NOTE
Depression Screening Entered On:  3/23/2021 5:38 PM CDT    Performed On:  3/23/2021 5:37 PM CDT by Jose Daniel Plunkett CMA               Depression Juaquin   Little Interest - Pleasure in Activities :   Not at all   Feeling Down, Depressed, Hopeless :   Not at all   Initial Depression Screen Score :   0 Score   Poor Appetite or Overeating :   Not at all   Trouble Falling or Staying Asleep :   Several days   Feeling Tired or Little Energy :   Not at all   Feeling Bad About Yourself :   Not at all   Trouble Concentrating :   Not at all   Moving or Speaking Slowly :   Not at all   Thoughts Better Off Dead or Hurting Self :   Not at all   Jose Daniel Plunkett CMA - 3/23/2021 5:37 PM CDT   Difficulty at Work, Home, Getting Along :   Somewhat difficult   Jose Daniel Plunkett CMA - 3/23/2021 5:38 PM CDT     Detailed Depression Screen Score :   1    Total Depression Screen Score :   1    Jose Daniel Plunkett CMA 3/23/2021 5:37 PM CDT

## 2022-02-16 NOTE — NURSING NOTE
CAGE Assessment Entered On:  9/23/2021 5:11 PM CDT    Performed On:  9/23/2021 5:11 PM CDT by Jose Daniel Plunkett CMA               Assessment   Have you ever felt you should cut down on your drinking :   No   Have people annoyed you by criticizing your drinking :   No   Have you ever felt bad or guilty about your drinking :   No   Have you ever taken a drink first thing in the morning to steady your nerves or get rid of a hangover (Eye-opener) :   No   CAGE Score :   0    Jose Daniel Plunkett CMA - 9/23/2021 5:11 PM CDT

## 2022-02-16 NOTE — PROGRESS NOTES
Patient:   HEATHER SALMON            MRN: [id number]            FIN: [id number]               Age:   39 years     Sex:  Female     :  1980   Associated Diagnoses:   Panic disorder; Stress due to marital problems   Author:   Puneet DURHAM, Gabriel      Impression and Plan   Diagnosis     Panic disorder (QHG28-FI F41.0).     Stress due to marital problems (QIM78-FQ Z63.0).     Course:  Worsening.    Orders     Orders (Selected)   Outpatient Orders  Ordered  Referral (Request): 19 11:43:00 CST, Referred to: Psychology, Referred to: Psychologist at [facility] JULITO - Generalized anxiety disorder  Prescriptions  Prescribed  ALPRAZolam 0.5 mg oral tablet: = 1 tab(s) ( 0.5 mg ), Oral, once, PRN: for anxiety, # 30 tab(s), 0 Refill(s), Type: Soft Stop.        Visit Information      Date of Service: 2019 11:20 am  Performing Location: [facility]  Encounter#: [id number]      Primary Care Provider (PCP):  NONE ,    Visit type:  New symptom.    Accompanied by:  No one.    Source of history:  Self.    History limitation:  None.       Chief Complaint   2019 11:26 AM CST  Pt here for  panic attack        History of Present Illness             The patient presents with anxiety.  The anxiety is characterized by difficulty concentrating, feeling of fear, feeling of dread, irritability, nervousness and restlessness.  The severity of the anxiety is severe.  The anxiety is constant.  The anxiety has lasted for an unknown duration of time.  The context of the anxiety: occurred in association with the inability to cope with stress.  Exacerbating factors consist of emotional stress, sleep deprivation and social change.  Relieving factors consist of rest and stress reduction.  Associated symptoms consist of agitation and altered behavior.        Review of Systems   Constitutional:  Negative.    Eye:  Negative.    Ear/Nose/Mouth/Throat:  Negative.    Respiratory:  Negative.     Cardiovascular:  Negative.    Gastrointestinal:  Negative.    Genitourinary:  Negative.    Hematology/Lymphatics:  Negative.    Endocrine:  Negative.    Immunologic:  Negative.    Musculoskeletal:  Negative.    Integumentary:  Negative.    Neurologic:  Negative.    Psychiatric:  Anxiety.    All other systems reviewed and negative      Health Status   Allergies:    Allergic Reactions (Selected)  Severity Not Documented  Concerta (No reactions were documented)  Glutens (No reactions were documented)  Milk Products (No reactions were documented)   Medications:  (Selected)   Prescriptions  Prescribed  ALPRAZolam 0.5 mg oral tablet: = 1 tab(s) ( 0.5 mg ), Oral, once, PRN: for anxiety, # 30 tab(s), 0 Refill(s), Type: Soft Stop  Adderall 10 mg oral tablet: = 1 tab(s) ( 10 mg ), Oral, qpm, # 30 tab(s), 0 Refill(s), Type: Maintenance, Pharmacy: [facility], 1 tab(s) Oral qpm  Adderall XR 30 mg oral capsule, extended release: = 1 cap(s) ( 30 mg ), Oral, qam, # 30 cap(s), 0 Refill(s), Type: Maintenance, Pharmacy: [facility], 1 cap(s) Oral qam  zolpidem 5 mg oral tablet: 1 tab(s), Oral, qhs, PRN: AS NEEDED FOR SLEEP, # 30 tab(s), 1 Refill(s), Type: Soft Stop, Pharmacy: [facility]   Problem list:    All Problems  ADHD / SNOMED CT 1571439803 / Confirmed  ADHD - Attention deficit disorder with hyperactivity / SNOMED CT 3127888985 / Confirmed  JULITO - Generalized anxiety disorder / SNOMED CT 158982445 / Confirmed  Generalized anxiety disorder / SNOMED CT 05081791 / Confirmed  Hip pain, bilateral / SNOMED CT 88737517 / Confirmed  Insomnia / SNOMED CT 623120274 / Confirmed  Low back pain / SNOMED CT 530049199 / Confirmed      Histories   Past Medical History:    Active  ADHD - Attention deficit disorder with hyperactivity (3502093705)  JULITO - Generalized anxiety disorder (819734040)  Low back pain (065678823)  Hip pain, bilateral (72802780)   Family History:    Attention deficit  disorder  Brother  Anxiety  Father  Hypertension  Father  Heart disease  Grandfather (M)  Pancreatic cancer  Grandfather (M)  Migraine  Mother  Lyme disease  Sister  Anxiety  Father  Colon cancer  Grandmother (P) ()  Comments:  10/29/2018 12:11 PM Tiffany Hawthorne  70s  ADHD (attention deficit hyperactivity disorder)....  Brother  Insomnia  Mother     Procedure history:    Cyst of breast (SNOMED CT 3258946667) on 2014 at 34 Years.  Comments:  10/29/2018 12:08 PM Tiffany Hawthorne  Right  Biopsy (SNOMED CT 063074295) on 2014 at 34 Years.  Comments:  10/29/2018 11:54 AM Tiffany Hawthorne  Benign fibroadipose tissue.    10/29/2018 11:52 AM Tiffany Hawthorne  Needle biopsy of right breast.  Caesarean section (SNOMED CT 20429736).  Tubal ligation (SNOMED CT 326841496).  Tooth extraction, multiple (SNOMED CT 72987948).      Physical Examination   Vital Signs   2019 11:26 AM CST Peripheral Pulse Rate 95 bpm    Systolic Blood Pressure 146 mmHg  HI    Diastolic Blood Pressure 82 mmHg  HI    Mean Arterial Pressure 103 mmHg    Oxygen Saturation 97 %      Measurements from flowsheet : Measurements   2019 11:26 AM CST Height Measured - Standard 66 in    Weight Measured - Standard 172 lb    BSA 1.9 m2    Body Mass Index 27.76 kg/m2  HI      General:  Alert and oriented X 3, No acute distress, Warm, Pink, Intact.         Appearance: Within normal limits, Well nourished, Calm.         Hydration: Within normal limits.         Psych: Within normal limits, Appropriate mood and affect, Cooperative, Normal judgment.    Psychiatric:  Cooperative, Normal judgment, Non-suicidal.         Mood and affect: Anxious, Sad, Tearful.         Behavior: Agitated.         Judgment: Able to make sensible decisions, Appropriate in social situations.         Thought process: Appropriate.

## 2022-02-16 NOTE — TELEPHONE ENCOUNTER
---------------------  From: Jossy Hauser MA (Phone Messages Pool ([id number]))   To: [place] Message Pool ([id number]);     Sent: 2021 11:36:58 AM CDT  Subject: Adderall refill     Pt called stating that pharmacy won't refill her Adderall 20mg bid rx.   CSS called  Drug to verify why they aren't refilling Adderall 20mg bid rx and was told that pt got rx for Adderall 30mg ER and 10mg 2021 and then Adderall 20mg bid 2021.   CSS called  Drug to verify why they aren't refilling her Adderall 20mg bid rx.  Please see previous phone/consumer messages re: issue.  Also per pharmacy, they want to speak w/ [facility] re: rx.   Please call/LM for pt at [phone]--pt very upset.Shashi, Pharmacy([facility]) requesting to speak with you Directly [phone]  Jose Daniel Plunkett CMA---------------------  From: Jose Daniel Plunkett CMA ([facility] Message Pool ([id number]))   To: Gabriel Abarca PA-C;     Sent: 2021 11:42:24 AM CDT  Subject: FW: Adderall refill---------------------  From: Gabriel Abarca PA-C   To: [facility] Message Pool ([id number]_[facility]);     Sent: 2021 11:56:45 AM CDT  Subject: RE: Adderall refill     I spoke with the [facility] pharmacist.  He told me that someone from our clinic told him not to fill any Adderall   for this patient.  I explained to him that I was aware of the multiple Rxs she had but I will take her word that she destroyed  the Rxs for 30 mg and 10 mg (or turned them in to [employer])  I had written 2 Rxs for 20 mg Adderall bid in .  She filled the first one.  The second one was destroyed  after the phone call from the clinic.  I resent the second Rx in today so it could be filled for her.Jackie, Please see Shashi's message below.  He did send in a new Rx today after speaking with your pharmacy.  We are sorry about any inconvenience.  Please let us know if you have any problems filling your prescriptions in the future.  Jose Daniel Plunkett  ROB---------------------  From: Jose Daniel Plunkett CMA ([facility] Message Pool ([id number]_[facility]))   To: HEATHER SALMON    Sent: 7/26/2021 12:11:06 PM CDT  Subject: FW: Adderall refillPatient updated.

## 2022-02-16 NOTE — NURSING NOTE
Comprehensive Intake Entered On:  1/30/2020 8:25 AM CST    Performed On:  1/30/2020 8:23 AM CST by Lali Bassett CMA               Summary   Chief Complaint :   Pt here for med ck   Weight Measured :   172 lb(Converted to: 172 lb 0 oz, 78.02 kg)    Height Measured :   66 in(Converted to: 5 ft 6 in, 167.64 cm)    Body Mass Index :   27.76 kg/m2 (HI)    Body Surface Area :   1.9 m2   Systolic Blood Pressure :   126 mmHg   Diastolic Blood Pressure :   78 mmHg   Mean Arterial Pressure :   94 mmHg   Peripheral Pulse Rate :   95 bpm   Oxygen Saturation :   99 %   Lali Bassett CMA - 1/30/2020 8:23 AM CST   Health Status   Allergies Verified? :   Yes   Medication History Verified? :   Yes   Medical History Verified? :   Yes   Pre-Visit Planning Status :   Completed   Lali Bassett CMA - 1/30/2020 8:23 AM CST   Consents   Consent for Immunization Exchange :   Consent Granted   Consent for Immunizations to Providers :   Consent Granted   Lali Bassett CMA - 1/30/2020 8:23 AM CST   Meds / Allergies   (As Of: 1/30/2020 8:25:56 AM CST)   Allergies (Active)   Concerta  Estimated Onset Date:   Unspecified ; Created By:   Lali Bassett CMA; Reaction Status:   Active ; Category:   Drug ; Substance:   Concerta ; Type:   Allergy ; Updated By:   Lali Bassett CMA; Reviewed Date:   1/30/2020 8:24 AM CST      Glutens  Estimated Onset Date:   Unspecified ; Created By:   Tiffany Connolly; Reaction Status:   Active ; Category:   Food ; Substance:   Glutens ; Type:   Allergy ; Updated By:   Tiffany Connolly; Reviewed Date:   1/30/2020 8:24 AM CST      Milk Products  Estimated Onset Date:   Unspecified ; Created By:   Tiffany Connolly; Reaction Status:   Active ; Category:   Food ; Substance:   Milk Products ; Type:   Allergy ; Updated By:   Tiffany Connolly; Reviewed Date:   1/30/2020 8:24 AM CST        Medication List   (As Of: 1/30/2020 8:25:56 AM CST)   Prescription/Discharge Order    ALPRAZolam  :   ALPRAZolam ; Status:   Prescribed ;  Ordered As Mnemonic:   ALPRAZolam 0.5 mg oral tablet ; Simple Display Line:   0.5 mg, 1 tab(s), Oral, q6 hrs, PRN: for anxiety, 30 tab(s), 0 Refill(s) ; Ordering Provider:   Gabriel Teixeira MD; Catalog Code:   ALPRAZolam ; Order Dt/Tm:   1/9/2020 1:17:37 PM CST          amphetamine-dextroamphetamine  :   amphetamine-dextroamphetamine ; Status:   Prescribed ; Ordered As Mnemonic:   Adderall 10 mg oral tablet ; Simple Display Line:   10 mg, 1 tab(s), Oral, qpm, 30 tab(s), 0 Refill(s) ; Ordering Provider:   Gabriel Teixeira MD; Catalog Code:   amphetamine-dextroamphetamine ; Order Dt/Tm:   1/27/2020 12:11:36 PM CST          amphetamine-dextroamphetamine  :   amphetamine-dextroamphetamine ; Status:   Prescribed ; Ordered As Mnemonic:   Adderall XR 30 mg oral capsule, extended release ; Simple Display Line:   30 mg, 1 cap(s), Oral, qam, 30 cap(s), 0 Refill(s) ; Ordering Provider:   Gabriel Teixeira MD; Catalog Code:   amphetamine-dextroamphetamine ; Order Dt/Tm:   1/27/2020 12:11:20 PM CST          zolpidem 5 mg oral tablet  :   zolpidem 5 mg oral tablet ; Status:   Prescribed ; Ordered As Mnemonic:   zolpidem 5 mg oral tablet ; Simple Display Line:   1 tab(s), Oral, qhs, PRN: AS NEEDED FOR SLEEP, 30 tab(s), 1 Refill(s) ; Ordering Provider:   Gabriel Teixeira MD; Catalog Code:   zolpidem ; Order Dt/Tm:   10/16/2019 4:21:26 PM CDT

## 2022-02-16 NOTE — TELEPHONE ENCOUNTER
---------------------  From: Lali Bassett CMA   To: HEATHER SALMON    Sent: 3/23/2021 10:20:54 AM CDT  Subject: refills     Heather Teixeira is out of the clinic on medical leave and unfortunately will be for an extended amount of time.  In order to get your refills, you are going to have to make an appt with another provider either by face to face in the clinic or a virtual visit like video or telephone.  Another provider will not fill your medication without an office visit.    ROB Saha with Dr Teixeira

## 2022-02-16 NOTE — PROGRESS NOTES
Patient:   HEATHER SALMON            MRN: [id number]            FIN: [id number]               Age:   40 years     Sex:  Female     :  1980   Associated Diagnoses:   Acute reaction to situational stress; Panic attacks   Author:   Gabriel Teixeira MD      Impression and Plan   Diagnosis     Acute reaction to situational stress (USD85-OT F43.0).     Panic attacks (HIE72-FG F41.0).     Course:  Worsening.    Orders     Orders   Charges (Evaluation and Management):  [id number] physician telephone evaluation 11-20 min (Charge) (Order): Quantity: 1, Panic attacks  Acute reaction to situational stress.     Orders (Selected)   Prescriptions  Prescribed  ALPRAZolam 0.5 mg oral tablet: = 1 tab(s) ( 0.5 mg ), Oral, q6 hrs, PRN: for anxiety, # 60 tab(s), 0 Refill(s), Type: Soft Stop, Pharmacy: [facility], 1 tab(s) Oral q6 hrs,PRN:for anxiety, 66, in, 20 11:05:00 CDT, Height Measured, 172, lb, 20 8:23:00 CST, Weight....        Visit Information      Date of Service: 2020 10:32 am  Performing Location: [facility]  Encounter#: [id number]      Primary Care Provider (PCP):  Gabriel Teixeira MD    NPI# [id number]      Referring Provider:  Gabriel Teixeira MD# [id number]   Visit type:  Telephone Encounter.    Source of history:  Self.    Location of patient:  _Home  Call Start Time:  1149  Call End Time:  1200   Referral source:  Self.    History limitation:  None.       Chief Complaint   2020 11:05 AM CDT    consent for telephone visit to discuss anxiety     _      History of Present Illness   Today's visit was conducted via telephone due to the COVID-19 pandemic.     Reason for visit:  _             The patient presents with This is a telephone visit conducted due to the coronavirus pandemic.  The patient is a 40-year-old female who calls in with concerns about an upsetting family situation that is causing increased stress, anxiety and panic attacks.  The patient has a history of  generalized anxiety disorder and commonly uses alprazolam 0.5 mg every 6 hours as needed.  She ran out of her prescription earlier this month and requests another prescription to help deal with the acute problem.  She is hoping that the problem will resolve in the near future and hopes that she will no longer need a benzodiazepine once the situation is resolved.  The patient is not suicidal and in no immediate danger.  .        Review of Systems   Constitutional:  Negative.    Eye:  Negative.    Ear/Nose/Mouth/Throat:  Negative.    Respiratory:  Negative.    Cardiovascular:  Negative.    Gastrointestinal:  Negative.    Genitourinary:  Negative.    Hematology/Lymphatics:  Negative.    Endocrine:  Negative.    Immunologic:  Negative.    Musculoskeletal:  Negative.    Integumentary:  Negative.    Neurologic:  Negative.    Psychiatric:  Negative.    All other systems reviewed and negative      Health Status   Allergies:    Allergic Reactions (Selected)  Severity Not Documented  Concerta (No reactions were documented)  Glutens (No reactions were documented)  Milk Products (No reactions were documented)   Medications:  (Selected)   Prescriptions  Prescribed  ALPRAZolam 0.5 mg oral tablet: = 1 tab(s) ( 0.5 mg ), Oral, q6 hrs, PRN: for anxiety, # 60 tab(s), 0 Refill(s), Type: Soft Stop, Pharmacy: [facility], 1 tab(s) Oral q6 hrs,PRN:for anxiety, 66, in, 09/02/20 11:05:00 CDT, Height Measured, 172, lb, 01/30/20 8:23:00 CST, Weight...  Adderall 10 mg oral tablet: = 1 tab(s) ( 10 mg ), Oral, qpm, # 30 tab(s), 0 Refill(s), Type: Maintenance, Pharmacy: [facility], 1 tab(s) Oral qpm, 66, in, 04/20/20 10:42:00 CDT, Height Measured, Weight Measured  Adderall XR 30 mg oral capsule, extended release: = 1 cap(s) ( 30 mg ), Oral, qam, # 30 cap(s), 0 Refill(s), Type: Maintenance, Pharmacy: [facility], 1 cap(s) Oral qam, 66, in, 04/20/20 10:42:00 CDT, Height Measured, Weight Measured  busPIRone 10 mg oral tablet:  = 1 tab(s) ( 10 mg ), Oral, tid, # 90 tab(s), 1 Refill(s), Type: Maintenance, Pharmacy: [facility], 1 tab(s) Oral tid, 66, in, 04/20/20 10:42:00 CDT, Height Measured, 172, lb, 01/30/20 8:23:00 CST, Weight Measured  zolpidem 5 mg oral tablet: = 1 tab(s), Oral, qhs, PRN: AS NEEDED FOR SLEEP, # 30 tab(s), 0 Refill(s), Type: Soft Stop, Pharmacy: [facility], 1 tab(s) Oral qhs,PRN:AS NEEDED FOR SLEEP, 66, in, 04/20/20 10:42:00 CDT, Height Measured, Weight Measured,    Medications          *denotes recorded medication          ALPRAZolam 0.5 mg oral tablet: 0.5 mg, 1 tab(s), Oral, q6 hrs, PRN: for anxiety, 60 tab(s), 0 Refill(s).          Adderall 10 mg oral tablet: 10 mg, 1 tab(s), Oral, qpm, 30 tab(s), 0 Refill(s).          Adderall XR 30 mg oral capsule, extended release: 30 mg, 1 cap(s), Oral, qam, 30 cap(s), 0 Refill(s).          busPIRone 10 mg oral tablet: 10 mg, 1 tab(s), Oral, tid, 90 tab(s), 1 Refill(s).          zolpidem 5 mg oral tablet: 1 tab(s), Oral, qhs, PRN: AS NEEDED FOR SLEEP, 30 tab(s), 0 Refill(s).       Problem list:    All Problems  ADHD / SNOMED CT 3402893319 / Confirmed  ADHD - Attention deficit disorder with hyperactivity / SNOMED CT 6849854164 / Confirmed  JULITO - Generalized anxiety disorder / SNOMED CT 338686117 / Confirmed  Generalized anxiety disorder / SNOMED CT 17403817 / Confirmed  Hip pain, bilateral / SNOMED CT 51808913 / Confirmed  Insomnia / SNOMED CT 983059866 / Confirmed  Low back pain / SNOMED CT 099171924 / Confirmed  Panic attacks / SNOMED CT 078214453 / Confirmed      Histories   Past Medical History:    Active  ADHD - Attention deficit disorder with hyperactivity (6514092880)  JULITO - Generalized anxiety disorder (525581015)  Low back pain (603458703)  Hip pain, bilateral (83522647)   Family History:    Attention deficit disorder  Brother  Anxiety  Father  Hypertension  Father  Heart disease  Grandfather (M)  Pancreatic cancer  Grandfather (M)  Migraine  Mother  Lyme  disease  Sister  Anxiety  Father  Colon cancer  Grandmother (P) ()  Comments:  10/29/2018 12:11 PM Tiffany Hawthorne  70s  ADHD (attention deficit hyperactivity disorder)....  Brother  Insomnia  Mother     Procedure history:    Cyst of breast (SNOMED CT 9904175873) on 2014 at 34 Years.  Comments:  10/29/2018 12:08 PM Tiffany Hawthorne  Right  Biopsy (SNOMED CT 816784150) on 2014 at 34 Years.  Comments:  10/29/2018 11:54 AM Tiffany Hawthorne  Benign fibroadipose tissue.    10/29/2018 11:52 AM Tiffany Hawthorne  Needle biopsy of right breast.  Caesarean section (SNOMED CT 95410342).  Tubal ligation (SNOMED CT 589544947).  Tooth extraction, multiple (SNOMED CT 31455682).   Social History:        Alcohol Assessment            Past      Tobacco Assessment            Former smoker, quit more than 30 days ago                     Comments:                      10/11/2018 - Tevin GOEL, Gabriel GARCIA                     quit       Substance Abuse Assessment            Never      Employment and Education Assessment            Employed, Work/School description: dental assistant, small business owner.      Home and Environment Assessment            Marital status: .  Spouse/Partner name: Jose Miguel.  Lives with Self, Children.  1 children.      Nutrition and Health Assessment            Diet restrictions: dairy.      Exercise and Physical Activity Assessment            Exercise frequency: 3-4 times/week.        Physical Examination   General:  Alert and oriented, No acute distress.    Respiratory:  Respirations are non-labored.    Psychiatric:  Cooperative, Appropriate mood & affect, Normal judgment.       Health Maintenance      Recommendations     Pending (in the next year)        Due            Influenza Vaccine due  20  and every 1  year(s)           Cervical Cancer Screen (if sexually active) due  20  Variable frequency           HIV Screen (if sexually active) (Female) due  20  and  every 1  year(s)           Intimate Partner Violence Screening due  09/02/20  and every 1  year(s)           Lipid Disorders Screen (Female) due  09/02/20  and every 1  year(s)           STD Counseling (if sexually active) (Female) due  09/02/20  and every 1  year(s)           Syphilis Screen (if sexually active) (Female) due  09/02/20  and every 1  year(s)           Type 2 Diabetes Mellitus Screen (Female) due  09/02/20  Variable frequency        Due In Future            Body Mass Index Check (Female) not due until  01/30/21  and every 1  year(s)           High Blood Pressure Screen (Female) not due until  01/30/21  and every 1  year(s)           Alcohol Misuse Screen (Female) not due until  01/30/21  and every 1  year(s)           Depression Screen (Female) not due until  01/30/21  and every 1  year(s)           Tobacco Use Screen (Female) not due until  04/20/21  and every 1  year(s)     Satisfied (in the past 1 year)        Satisfied            Alcohol Misuse Screen (Female) on  01/30/20.           Body Mass Index Check (Female) on  01/30/20.           Body Mass Index Check (Female) on  11/21/19.           Depression Screen (Female) on  01/30/20.           Depression Screen (Female) on  01/30/20.           Depression Screen (Female) on  01/30/20.           High Blood Pressure Screen (Female) on  01/30/20.           High Blood Pressure Screen (Female) on  11/21/19.           Influenza Vaccine on  10/28/19.           Tobacco Use Screen (Female) on  04/20/20.           Tobacco Use Screen (Female) on  11/21/19.

## 2022-02-16 NOTE — TELEPHONE ENCOUNTER
---------------------  From: Marli Abebe CMA (eRx Pool (32224_[facility]))   To: ARACELIS Message Pool (32224_[facility])   ;     Sent: 10/14/2020 6:47:52 AM CDT  Subject: FW: Prescription renewal request     Alprazolam: last filled 9/2/20 #60  Zolpidem: last filled 9/18/20 #7    9/21/20 video visit  12/27/18 [facility]      ---------------------  From: JACKIE SALMON  To: [facility]  Sent: 10/13/2020 03:56 p.m. CDT  Subject: Prescription renewal request  JACKIE SALMON is requesting renewal of the prescription(s) below and has submitted the following details:  Prescription(s) to be renewed  Medication: ALPRAZolam 0.5 mg oral tablet  Dose: 1 tab(s)  Frequency: every 6 hours as needed  Rx date: 09/02/2020  Prescribed by: Gabriel Teixeira MD  Reason for renewal:   Quantity:   Medication: zolpidem 5 mg oral tablet  Dose: 1 tab(s)  Frequency: at bedtime as needed  Rx date: 09/18/2020  Prescribed by: Brandan Rodas MD  Reason for renewal:   Quantity:   Delivery option  [place] [address]  Phone: [phone]  Contact Information  By Secure Message---------------------  From: Lali Bassett CMA (ARACELIS Message Pool (32224_[facility])   )   To: Gabriel Teixeira MD;     Sent: 10/14/2020 7:35:57 AM CDT  Subject: FW: Prescription renewal request---------------------  From: Lali Bassett CMA (ARACELIS Message Pool (32224_[facility])   )   To: JACKIE SALMON    Sent: 10/14/2020 12:29:03 PM CDT  Subject: FW: Prescription renewal request     Jackie Barnes refills have been sent to the pharmacy    ROB Saha with Dr Teixeira

## 2022-02-16 NOTE — NURSING NOTE
Generalized Anxiety Disorder Screening Entered On:  9/23/2021 5:12 PM CDT    Performed On:  9/23/2021 5:11 PM CDT by Jose Daniel Plunkett CMA               JULITO-7   JULITO Nervous, Anxious On Edge :   Not at all   JULITO Control Worrying B :   Not at all   JULITO Worrying Too Much :   Not at all   JULITO Trouble Relaxing :   More than half the days   JULITO Restless :   Not at all   JULITO Easily Annoyed/Irritable :   Not at all   JULITO Afraid :   Not at all   JULITO Total Screening Score :   2    JULITO Difficulty with Work, Home, Others :   Somewhat difficult   Jose Daniel Plunkett CMA - 9/23/2021 5:11 PM CDT

## 2022-02-16 NOTE — PROGRESS NOTES
Patient:   HEATHER SALMON            MRN: [id number]            FIN: [id number]               Age:   39 years     Sex:  Female     :  1980   Associated Diagnoses:   ADHD; Generalized anxiety disorder; Panic attacks; Insomnia   Author:   Gabriel Teixeira MD      Impression and Plan   Diagnosis     ADHD (DAF21-FB F90.9).     Course:  Progressing as expected.    Orders     Orders   Charges (Evaluation and Management):  64185 office outpatient visit 25 minutes (Charge) (Order): Quantity: 1, Generalized anxiety disorder  ADHD  Insomnia.     Orders (Selected)   Prescriptions  Prescribed  Adderall 10 mg oral tablet: = 1 tab(s) ( 10 mg ), Oral, qpm, # 30 tab(s), 0 Refill(s), Type: Maintenance, Pharmacy: [facility], 1 tab(s) Oral qpm  Adderall XR 30 mg oral capsule, extended release: = 1 cap(s) ( 30 mg ), Oral, qam, # 30 cap(s), 0 Refill(s), Type: Maintenance, Pharmacy: [facility], 1 cap(s) Oral qam.     Diagnosis     Generalized anxiety disorder (XPG86-EY F41.1).     Panic attacks (ACV98-SJ F41.0).     Course:  Progressing as expected.    Orders     Orders (Selected)   Prescriptions  Prescribed  ALPRAZolam 0.5 mg oral tablet: = 1 tab(s) ( 0.5 mg ), Oral, q6 hrs, PRN: for anxiety, # 30 tab(s), 0 Refill(s), Type: Soft Stop, Pharmacy: [facility], 1 tab(s) Oral q6 hrs,PRN:for anxiety.     Diagnosis     Insomnia (HBZ34-GQ G47.00).     Course:  Progressing as expected.    Orders     Orders (Selected)   Prescriptions  Prescribed  zolpidem 5 mg oral tablet: = 1 tab(s), Oral, qhs, PRN: AS NEEDED FOR SLEEP, # 30 tab(s), 1 Refill(s), Type: Soft Stop, Pharmacy: [facility], 1 tab(s) Oral qhs,PRN:AS NEEDED FOR SLEEP.        Visit Information      Date of Service: 2020 08:19 am  Performing Location: [facility]  Encounter#: [id number]      Primary Care Provider (PCP):  NONE ,       Referring Provider:  Gabriel Teixeira MD    NPI# [id number]   Visit type:  Scheduled  follow-up.    Accompanied by:  No one.    Source of history:  Self.    Referral source:  Self.    History limitation:  None.       Chief Complaint   1/30/2020 8:23 AM CST    Pt here for [facility] ck        History of Present Illness             The patient presents Attention Deficit Discorder.  There are no modifying factors.  Associated symptoms consist of denies decrease in social interaction, denies fatigue, denies impaired judgement, denies insomnia, denies loss of appetite and denies weight loss.  Additional pertinent history: none.               The patient presents with insomnia.  The severity of the insomnia is severe.  The insomnia is constant.  The insomnia has lasted for several years.  There are no modifying factors.  Associated symptoms consist of fatigue.  Additional pertinent history: none.               The patient presents with anxiety.  The anxiety is characterized by difficulty concentrating, irritability, nervousness and restlessness.  The severity of the anxiety is moderate.  The anxiety is constant.  The context of the anxiety: occurred in association with the inability to cope with stress.  Exacerbating factors consist of emotional stress and social change.  Relieving factors consist of medication and stress reduction.        Review of Systems   Constitutional:  Negative.    Eye:  Negative.    Ear/Nose/Mouth/Throat:  Negative.    Respiratory:  Negative.    Cardiovascular:  Negative.    Gastrointestinal:  Negative.    Genitourinary:  Negative.    Hematology/Lymphatics:  Negative.    Endocrine:  Negative.    Immunologic:  Negative.    Musculoskeletal:  Negative.    Integumentary:  Negative.    Neurologic:  Negative.    Psychiatric:  Negative except as documented in history of present illness.    All other systems reviewed and negative      Health Status   Allergies:    Allergic Reactions (Selected)  Severity Not Documented  Concerta (No reactions were documented)  Glutens (No reactions were  documented)  Milk Products (No reactions were documented)   Medications:  (Selected)   Prescriptions  Prescribed  ALPRAZolam 0.5 mg oral tablet: = 1 tab(s) ( 0.5 mg ), Oral, q6 hrs, PRN: for anxiety, # 30 tab(s), 0 Refill(s), Type: Soft Stop, Pharmacy: [facility], 1 tab(s) Oral q6 hrs,PRN:for anxiety  Adderall 10 mg oral tablet: = 1 tab(s) ( 10 mg ), Oral, qpm, # 30 tab(s), 0 Refill(s), Type: Maintenance, Pharmacy: [facility], 1 tab(s) Oral qpm  Adderall XR 30 mg oral capsule, extended release: = 1 cap(s) ( 30 mg ), Oral, qam, # 30 cap(s), 0 Refill(s), Type: Maintenance, Pharmacy: [facility], 1 cap(s) Oral qam  zolpidem 5 mg oral tablet: = 1 tab(s), Oral, qhs, PRN: AS NEEDED FOR SLEEP, # 30 tab(s), 1 Refill(s), Type: Soft Stop, Pharmacy: [facility], 1 tab(s) Oral qhs,PRN:AS NEEDED FOR SLEEP   Problem list:    All Problems  ADHD / SNOMED CT 8585920095 / Confirmed  ADHD - Attention deficit disorder with hyperactivity / SNOMED CT 6333216091 / Confirmed  JULITO - Generalized anxiety disorder / SNOMED CT 586966150 / Confirmed  Generalized anxiety disorder / SNOMED CT 94077005 / Confirmed  Hip pain, bilateral / SNOMED CT 71339555 / Confirmed  Insomnia / SNOMED CT 688286600 / Confirmed  Low back pain / SNOMED CT 271322026 / Confirmed      Histories   Past Medical History:    Active  ADHD - Attention deficit disorder with hyperactivity (6966157134)  JULITO - Generalized anxiety disorder (206820009)  Low back pain (395816154)  Hip pain, bilateral (82628863)   Family History:    Attention deficit disorder  Brother  Anxiety  Father  Hypertension  Father  Heart disease  Grandfather (M)  Pancreatic cancer  Grandfather (M)  Migraine  Mother  Lyme disease  Sister  Anxiety  Father  Colon cancer  Grandmother (P) ()  Comments:  10/29/2018 12:11 PM WILMER - Tiffany Connolly  70s  ADHD (attention deficit hyperactivity disorder)....  Brother  Insomnia  Mother     Procedure history:    Cyst of breast (SNOMED CT  0516988634) on 7/31/2014 at 34 Years.  Comments:  10/29/2018 12:08 PM REYNAT Tiffany Slater  Right  Biopsy (SNOMED CT 853857504) on 7/18/2014 at 34 Years.  Comments:  10/29/2018 11:54 AM Tiffany Hawthorne  Benign fibroadipose tissue.    10/29/2018 11:52 AM Tiffany Hawthorne  Needle biopsy of right breast.  Caesarean section (SNOMED CT 75518583).  Tubal ligation (SNOMED CT 732072710).  Tooth extraction, multiple (SNOMED CT 27824454).   Social History:        Alcohol Assessment            Past      Tobacco Assessment            Former smoker, quit more than 30 days ago                     Comments:                      10/11/2018 - Tevin GOEL, Gabriel GARCIA                     quit 2010      Substance Abuse Assessment            Never      Employment and Education Assessment            Employed, Work/School description: dental assistant, small business owner.      Home and Environment Assessment            Marital status: .  Spouse/Partner name: Jose Miguel.  Lives with Self, Children.  1 children.      Nutrition and Health Assessment            Diet restrictions: dairy.      Exercise and Physical Activity Assessment            Exercise frequency: 3-4 times/week.        Physical Examination   Vital Signs   1/30/2020 8:23 AM CST Peripheral Pulse Rate 95 bpm    Systolic Blood Pressure 126 mmHg    Diastolic Blood Pressure 78 mmHg    Mean Arterial Pressure 94 mmHg    Oxygen Saturation 99 %      Measurements from flowsheet : Measurements   1/30/2020 8:23 AM CST Height Measured - Standard 66 in    Weight Measured - Standard 172 lb    BSA 1.9 m2    Body Mass Index 27.76 kg/m2  HI      PHQ 9 score: 5  JULITO 7 score: 12   General:  No acute distress.    Neck:  Supple, No lymphadenopathy, No thyromegaly.    Respiratory:  Lungs are clear to auscultation, Respirations are non-labored, Breath sounds are equal, Symmetrical chest wall expansion.    Cardiovascular:  Normal rate, Regular rhythm, No murmur, No gallop, Good pulses equal in all  extremities, Normal peripheral perfusion, No edema.    Gastrointestinal:  Soft, Non-tender, Non-distended, Normal bowel sounds, No organomegaly.    Integumentary:  Warm, Dry, Pink.    Neurologic:  Alert, Oriented.    Psychiatric:  Cooperative, Appropriate mood & affect, Non-suicidal.         Mood and affect: Calm.         Behavior: Relaxed.         Judgment: Able to make sensible decisions, Appropriate in social situations.         Thought process: Appropriate.

## 2022-02-16 NOTE — TELEPHONE ENCOUNTER
Entered by Chas Meza MD on June 06, 2021 3:25:40 PM CDT  ---------------------  From: Chas Meza MD   To: [facility]    Sent: 6/6/2021 3:25:40 PM CDT  Subject: Medication Management     ** Not Approved: Patient needs appointment **  ALPRAZolam (ALPRAZOLAM 0.5MG)  TAKE ONE (1) TABLET BY MOUTH EVERY SIX (6) HOURS AS NEEDED FOR ANXIETY  Qty:  60 tab(s)        Days Supply:  15        Refills:  0          Substitutions Allowed     Route To Pharmacy - [facility]   Note from Pharmacy:  ervin  Signed by Chas Meza MD            ------------------------------------------  From: [facility]  To: Gabriel Teixeira MD  Sent: June 4, 2021 9:06:48 AM CDT  Subject: Medication Management  Due: June 4, 2021 8:26:15 PM CDT     ** On Hold Pending Signature **     Drug: ALPRAZolam (ALPRAZolam 0.5 mg oral tablet), TAKE ONE (1) TABLET BY MOUTH EVERY SIX (6) HOURS AS NEEDED FOR ANXIETY  Quantity: 60 tab(s)  Days Supply: 15  Refills: 0  Substitutions Allowed  Notes from Pharmacy:     Dispensed Drug: ALPRAZolam (ALPRAZolam 0.5 mg oral tablet), TAKE ONE (1) TABLET BY MOUTH EVERY SIX (6) HOURS AS NEEDED FOR ANXIETY  Quantity: 60 tab(s)  Days Supply: 15  Refills: 0  Substitutions Allowed  Notes from Pharmacy: ervin  ------------------------------------------

## 2022-02-16 NOTE — TELEPHONE ENCOUNTER
---------------------  From: Lali Bassett CMA   To: Gabriel Teixeira MD;     Sent: 2020 11:07:17 AM CST  Subject: refill request-alprazolam     PCP:   ARACELIS    Medication:   alprazolam  Last Filled:  19    Quantity:  30  Refills:  0      Date of last office visit and reason:   19      Note:  Pt requesting refill    Pharmacy: [facility]    Resource:   Jackie  Phone:   [phone] left via secure voicemail that refill went to the pharmacy

## 2022-02-16 NOTE — TELEPHONE ENCOUNTER
---------------------From: Zenaida Arredondo LPN (Phone Messages Pool (32224_[facility])) To: TFS Message Pool (32224_[facility]);   Sent: 9/27/2019 3:36:46 PM CDTSubject: adderall/CSA FYI- Pt scheduled with TFS todayPhone MessagePCP:   ARACELIS  Time of Call:  2:47/2:54pm   Person Calling:  Moon number:  [phone] call at: 3:25pmNote:   Pt LM stating she needs a Rx refill. Pt says something is mixed up.See note 9/26 (attached). Verified with [facility] that Rx sent 8/27 was from ARACELIS not Dr. Sood- they thinks it must have been put in incorrectly so now shows up in PDMP as Dr. Sood prescribing.Returned call and informed pt of note from 9/26.  Pt prescribed hydrocodone in September- she says it was for a surgery she had on a tooth. Told her that per CSA she is to inform provider of any narcotics/controlled substances provided by any other provider. Pt says she was not aware of this- told her it is listed in the CSA that she signed in December 2018. Pt wants her Adderall- says he needs it for Monday. Told her she has to be seen. Pt wants to be seen today to discuss with someone because she has to work all week. Transferred to scheduling.Last office visit and reason:  7/29/19 LUCASI spoke with patient prior to receiving this message.  Per TFS okay to fill for 1 month.  Because TFS does not prescribe electronically, and the confusion of this request and the late hour on Friday, asked [facility] to fill rx's.  Patient informed and advised to f/up with ARACELIS prior to additional refills.

## 2022-02-16 NOTE — TELEPHONE ENCOUNTER
Entered by Josefa Galindo RN on August 09, 2021 4:53:30 PM CDT  ---------------------  From: Josefa Galindo RN   To: [facility]    Sent: 8/9/2021 4:53:30 PM CDT  Subject: Medication Management     ** Not Approved: Patient needs appointment, Scheduled appt 8/11/21. Wait until then for refill **  ALPRAZolam (ALPRAZOLAM 0.5MG)  ONE (1) TAB(S) ORAL EVERY 6 HOURS HRS AS NEEDED FOR ANXIETY  Qty:  60 tab(s)        Days Supply:  20        Refills:  1          Substitutions Allowed     Route To Pharmacy - [facility]   Signed by Josefa Galindo RN            ------------------------------------------  From: [facility]  To: Gabriel Abarca PA-C  Sent: August 7, 2021 10:16:25 AM CDT  Subject: Medication Management  Due: August 7, 2021 10:34:17 AM CDT     ** On Hold Pending Signature **     Drug: ALPRAZolam (ALPRAZolam 0.5 mg oral tablet), ONE (1) TAB(S) ORAL EVERY 6 HOURS HRS AS NEEDED FOR ANXIETY  Quantity: 60 tab(s)  Days Supply: 20  Refills: 1  Substitutions Allowed  Notes from Pharmacy:     Dispensed Drug: ALPRAZolam (ALPRAZolam 0.5 mg oral tablet), ONE (1) TAB(S) ORAL EVERY 6 HOURS HRS AS NEEDED FOR ANXIETY  Quantity: 60 tab(s)  Days Supply: 20  Refills: 1  Substitutions Allowed  Notes from Pharmacy:  ------------------------------------------

## 2022-02-16 NOTE — TELEPHONE ENCOUNTER
Entered by Jose Daniel Plunkett CMA on September 10, 2021 10:48:34 AM CDT  PCP:   [facility]    Medication:   adderall 30 mg  Last Filled:  8/11/21    Quantity:  60  Refills:  0      alprazolam 8/11/21-#90 and 0 refills    Date of last office visit and reason:   8/11/21 anxiety/ADHD    Date of last Med Check / Px:     Date of last labs pertaining to condition:  Drug screen 3/23/21    Note:  [facility] is OC until 9/20.  Please advise on refills.  Jose Daniel Plunkett CMA    Return to Clinic order placed?  yes    Resource:   _  Phone:   _  Fax:  _          ---------------------  From: HEATHER SALMON  To: [facility]  Sent: 09/09/2021 05:42 p.m. CDT  Subject: Prescription renewal request  HEATHER SALMON is requesting renewal of the prescription(s) below and has submitted the following details:  Prescription(s) to be renewed  Medication: Adderall 30 mg oral tablet  Dose: 1 tab(s)  Frequency: 2 times a day  Rx date: 08/11/2021  Prescribed by: Gabriel Abarca PA-C  Reason for renewal:   Quantity:   Medication: ALPRAZolam 0.5 mg oral tablet  Dose: 1 tab(s)  Frequency: 3 times a day as needed  Rx date: 08/17/2021  Prescribed by: Gabriel Abarca PA-C  Reason for renewal:   Quantity:   Delivery option  [place] [address]  Phone: [phone]  Contact Information  By Secure Message  Comments  Thank you---------------------  From: Jose Daniel Plunkett CMA (eRx Pool (61924_[facility]))   To: Advanced Practice Provider Pool (33424_[facility]);     Sent: 9/10/2021 10:48:40 AM CDT  Subject: FW: Prescription renewal request---------------------  From: Keisha Bustos (Advanced Practice Provider Pool (96756_[facility]))   To: eRx Pool (89783_[place] [facility] [place]);     Sent: 9/10/2021 2:18:13 PM CDT  Subject: RE: Prescription renewal request     I refilled  the Xanax can't be picked up till 9/17, 90 tabs needs to last 30 days  The [place] Prescription Drug  Monitoring Program website queried today, no concernsMichelle, Please see Keisha Plunkett CMA---------------------  From: Jose Daniel Plunkett CMA (eRx Pool (32224_[facility]))   To: HEATHER SALMON    Sent: 9/10/2021 2:21:38 PM CDT  Subject: FW: Prescription renewal request

## 2022-02-16 NOTE — TELEPHONE ENCOUNTER
---------------------  From: Lali Bassett CMA   To: Gabriel Teixeira MD;     Sent: 3/7/2019 1:34:50 PM CST  Subject: General Message     Pt is wanting a refill of the alprazolam.. she states that she ended up having to take 2 tabs for them to work.    I instructed pt that she needs to come in and discuss meds with ARACELIS.  She is making an appt for next Thursday as that is the first day she will be able to leave work.    Are you willing to refill alprazolam until appt?---------------------  From: Gabriel Teixeira MD   To: Lali Bassett CMA;     Sent: 3/7/2019 3:37:37 PM CST  Subject: RE: General Message     It was supposed to be a short term prescription - I did not intend for her to keep taking it. I do want her to come in next week to discuss this and other treatment options.Pt voiced understanding

## 2022-02-16 NOTE — TELEPHONE ENCOUNTER
---------------------  From: Beti Curtis RN (Phone Messages Pool (35247_[facility]))   To: JACKIE SALMON    Sent: 10/14/2021 8:33:33 AM CDT  Subject: RE: Med. check     Yefri Mejia,     According to your last note, it sounds like your insurance is the reason that you are not able to  your prescription until the 18th.  The prescription that was sent over on 9/23/21 was to be filled on or after the 8th. I would call your pharmacy to see if they can do anything to help.      Thank you,  Ayden HARPER      ---------------------  From: JACKIE SALMON  To: [facility]  Sent: 10/14/2021 07:06 a.m. CDT  Subject: Med. check  Hi, I was wondering if I had to make another appointment because I just saw Dr. Abarca about a month ago about my anxiety? He put me on a routine and refilled my prescription but the pharmacy couldn t refill due to insurance reasons. Will I be able to pick my monthly refill today for the alprolizspam? I was in sat. Morning and the nice  Helped me out with a 4 day supply. I am most worried about my blood pressure rising again and this is the only med that helps with everything. Including my genetic hand tremors, headaches, insomnia, cig. Cravings & of course my anxiety/ panic.  Thank you for your time and patience, hopefully someday soon I won t need anything but right know I need help.  Jackie salmon
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
## 2022-02-16 NOTE — TELEPHONE ENCOUNTER
---------------------  From: Lali Bassett CMA (eRx Pool (32224_[facility]))   To: JACKIE SALMON    Sent: 8/11/2020 2:25:55 PM CDT  Subject: RE: Prescription renewal request     Jackie Barnes prescription has been sent to the pharmacy.    ROB Saha with Dr Teixeira      ---------------------  From: JACKIE SALMON  To: [facility]  Sent: 08/10/2020 12:05 p.m. CDT  Subject: Prescription renewal request  JACKIE SALMON is requesting renewal of the prescription(s) below and has submitted the following details:  Prescription(s) to be renewed  Medication: ALPRAZolam 0.5 mg oral tablet  Dose: 1 tab(s)  Frequency: every 6 hours as needed  Rx date: 07/13/2020  Prescribed by: Gabriel Teixeira MD  Reason for renewal:   Quantity:   Delivery option  [place] [address]  Phone: [phone]  Contact Information  By Secure Message

## 2022-02-16 NOTE — TELEPHONE ENCOUNTER
"---------------------  From: Vicky Christopher LPN (Phone Messages Pool ([id number]_[facility]))   To: [employer] Message Pool ([id number]);     Sent: 8/16/2021 5:14:23 PM CDT  Subject: FW: Anxiety           ---------------------  From: JACKIE MCDONOUGH  To: [facility]  Sent: 08/16/2021 04:58 p.m. CDT  Subject: Anxiety  So I did 20mg adhd meds & 1 lorazepam this morning. It does not work very good for me. It does not stop the hand tremor & I still had anxiety. The only thing that works is the alprazolam for anxiety / panic attacks. I would not need to take it as much if they legalized medical Marijuana. With that being said.. I really don t know what to say anymore. It isn t easy switching meds all the time.  Feeling like this all the time is not fun. Please contact me as soon as you can.    Thanks,  Jackie Mcdonough---------------------  From: Isabel Bonds CMA ([employer] Message Pool ([id number]))   To: Gabriel Abarca PA-C;     Sent: 8/17/2021 7:08:19 AM CDT  Subject: FW: Anxiety---------------------  From: Gabriel Abarca PA-C   To: [employer] Pool ([id number]_[facility]);     Sent: 8/17/2021 7:24:37 AM CDT  Subject: RE: Anxiety     It will take some time to adjust to the lorazepam.  I do think it will last longer and help more   than the alprazolam.  Try staying with it another week.---------------------  From: Jose Daniel Pluknett CMA ([employer]head Message Pool ([id number]_[facility]))   To: JACKIE MCDONOUGH    Sent: 8/17/2021 8:18:58 AM CDT  Subject: FW: Anxietypt calling at 1110 stating she missed a phone call  advised of [facility]'s recommendation and she doesn't see how another wk of this will help her.  States in a \"full blown\" panic attack right now.  Felt this way yesterday also.  States the lorazepam hasn't done anything - making her more tired, still very nervous and hasn't helped with her hand tremors at all.  She hadn't increased her Adderall yest - wanted to give the " lorazepam a chance first.  Recommended that she may want to try the increased dose.  She states she took the 20mg BID yest and this am took 20mg and then just took 30mg at noon with her next scheduled dose.  Offered an appt with you to discuss and she states she's at work and asking if she could just send another portal message.  I told her I would relay the message and would advised further.---------------------  From: Jose Daniel Plunkett CMA ([employer] Message Pool [id number])   To: Gabriel Abarca PA-C;     Sent: 8/17/2021 11:51:12 AM CDT  Subject: FW: Anxiety---------------------  From: Gabriel Abarca PA-C   To: [employer] ([id number]_[facility]);     Sent: 8/17/2021 12:04:10 PM CDT  Subject: RE: Anxiety     Please have her stop the lorazepam,  will send in new Rx for alprazolam to take tid prn anxiety  Follow up in a month  Please call [facility] and let them knowI called [facility] and gave them [employer] message.  Provider message forwarded to pt via portal.  Jose Daniel Plunkett CMA---------------------  From: Jose Daniel Plunkett CMA ([employer] Message Pool ([id number]_[facility]))   To: HEATHER SALMON    Sent: 8/17/2021 1:22:05 PM CDT  Subject: FW: Anxiety

## 2022-02-16 NOTE — TELEPHONE ENCOUNTER
---------------------  From: Lali Bassett CMA   To: Gabriel Teixeira MD;     Sent: 2019 9:24:17 AM CDT  Subject: refill request-adderall 10 mg     PCP:   ARACELIS    Medication:   adderall 10 mg  Last Filled:   19  Quantity:  30  Refills:  0      Date of last office visit and reason:   4/15/19      Note:  pt called requesting refill    Pharmacy: [facility]    Resource:   Jackie  Phone:   [phone]---------------------  From: Gabriel Teixeira MD   To: Lali Bassett CMA;     Sent: 2019 10:50:07 AM CDT  Subject: RE: refill request-adderall 10 mg     due for clinic visitpt notified......and transferred to scheduling

## 2022-02-16 NOTE — TELEPHONE ENCOUNTER
---------------------  From: Serjio RIVAS Lali (Phone Messages Pool ([id number]))   To: [employer] Message Pool ([id number]);     Sent: 8/4/2021 4:32:17 PM CDT  Subject: FW: Anxiety & Headaches           ---------------------  From: HEATHER SALMON  To: [facility]  Sent: 08/04/2021 04:29 p.m. CDT  Subject: Anxiety & Headaches  I m still having a hard time with my anxiety I m sure I can quit my cig. habit fast if I could possibly get a higher dose? I would cut them in half if I didn t need a full dose. I only have 8 left. 2 days worth.  Also... I get sever headaches when it gets very humid out. I ve tried everything over the counter.  I didn t get them for awhile but with that being said I ve had one for 3 days straight. I don t know if its tension or what but is there anything to help with the pain?  Trust me, I drink ALOT of water so I know its not that...  Its hard to work all day when you cant sleep due to headaches.  And thank you for taking care of the other situation at the pharmacy! If you do choose to help me out with some meds. I will give [facility] one more try.    Thanks for your time and patience,  Heather Salmon---------------------  From: Jose Daniel Plunkett CMA ([employer] Message Pool ([id number]))   To: Gabriel Abarca PA-C;     Sent: 8/4/2021 4:34:33 PM CDT  Subject: FW: Anxiety & Headaches---------------------  From: Gabriel Abarca PA-C   To: [employer] Message Pool ([id number]);     Sent: 8/4/2021 4:53:19 PM CDT  Subject: RE: Anxiety & Headaches     she should schedule a clinic visit (telemedicine is okay) to discuss these issuesMichelle, Shashi would like you to schedule a visit to further discuss these issues.  He did say a telephone visit would be ok.  Thank you Jose Daniel Plunkett CMA---------------------  From: Jose Daniel Plunkett CMA ([facility] Message Pool ([id number]_[facility]))   To: HEATHER SALMON    Sent: 8/4/2021 5:19:14 PM CDT  Subject:  FW: Anxiety & Headaches

## 2022-02-16 NOTE — TELEPHONE ENCOUNTER
Entered by Lulu Ramirez CMA on June 24, 2020 2:36:53 PM CDT  ---------------------  From: Lulu Ramirez CMA   To: [facility]    Sent: 6/24/2020 2:36:49 PM CDT  Subject: Medication Management     ** Not Approved: Patient has requested refill too soon, Filled 6/22/2020 **  amphetamine-dextroamphetamine (AMPHETAMINE/DEXTR ER 30MG)  ONE (1) CAP(S) ORAL EVERY MORNING  Qty:  30 cap(s)        Days Supply:  30        Refills:  0          Substitutions Allowed     Route To Pharmacy [facility]   Signed by Lulu Ramirez CMA            ** Not Approved: Patient has requested refill too soon, Filled 6/22/2020 **  amphetamine-dextroamphetamine (AMPHETAMINE/DEXTR 10MG)  ONE (1) TAB(S) ORAL EVERY EVENING  Qty:  30 tab(s)        Days Supply:  30        Refills:  0          Substitutions Allowed     Route To [facility]   Signed by Lulu Ramirez CMA              ** Patient matched by Lulu Ramirez CMA on 6/24/2020 2:11:33 PM CDT **      ------------------------------------------  From: [facility]  To: Gabriel Teixeira MD  Sent: June 19, 2020 3:52:28 PM CDT  Subject: Medication Management  Due: June 17, 2020 4:19:06 PM CDT     ** On Hold Pending Signature **     Drug: amphetamine-dextroamphetamine (amphetamine-dextroamphetamine 30 mg oral capsule, extended release), ONE (1) CAP(S) ORAL EVERY MORNING  Quantity: 30 cap(s)  Days Supply: 30  Refills: 0  Substitutions Allowed  Notes from Pharmacy:     Dispensed Drug: amphetamine-dextroamphetamine (amphetamine-dextroamphetamine 30 mg oral capsule, extended release), ONE (1) CAP(S) ORAL EVERY MORNING  Quantity: 30 cap(s)  Days Supply: 30  Refills: 0  Substitutions Allowed  Notes from Pharmacy:     ** On Hold Pending Signature **     Drug: amphetamine-dextroamphetamine (amphetamine-dextroamphetamine 10 mg oral tablet), ONE (1) TAB(S) ORAL EVERY EVENING  Quantity: 30 tab(s)  Days Supply: 30  Refills: 0  Substitutions Allowed  Notes from Pharmacy:      Dispensed Drug: amphetamine-dextroamphetamine (amphetamine-dextroamphetamine 10 mg oral tablet), ONE (1) TAB(S) ORAL EVERY EVENING  Quantity: 30 tab(s)  Days Supply: 30  Refills: 0  Substitutions Allowed  Notes from Pharmacy:  ------------------------------------------

## 2022-02-16 NOTE — NURSING NOTE
Comprehensive Intake Entered On:  4/20/2020 10:43 AM CDT    Performed On:  4/20/2020 10:42 AM CDT by Lali Bassett CMA               Summary   Chief Complaint :   verbal consent given for telephone visit for med refills   Height Measured :   66 in(Converted to: 5 ft 6 in, 167.64 cm)    Lali Bassett CMA - 4/20/2020 10:42 AM CDT   Health Status   Allergies Verified? :   Yes   Medication History Verified? :   Yes   Medical History Verified? :   Yes   Tobacco Use? :   Former smoker   Lali Bassett CMA - 4/20/2020 10:42 AM CDT   Consents   Consent for Immunization Exchange :   Consent Granted   Consent for Immunizations to Providers :   Consent Granted   Lali Bassett CMA - 4/20/2020 10:42 AM CDT   Meds / Allergies   (As Of: 4/20/2020 10:43:16 AM CDT)   Allergies (Active)   Concerta  Estimated Onset Date:   Unspecified ; Created By:   Lali Bassett CMA; Reaction Status:   Active ; Category:   Drug ; Substance:   Concerta ; Type:   Allergy ; Updated By:   Lali Bassett CMA; Reviewed Date:   4/20/2020 10:43 AM CDT      Glutens  Estimated Onset Date:   Unspecified ; Created By:   Tiffany Connolly; Reaction Status:   Active ; Category:   Food ; Substance:   Glutens ; Type:   Allergy ; Updated By:   Tiffany Connolly; Reviewed Date:   4/20/2020 10:43 AM CDT      Milk Products  Estimated Onset Date:   Unspecified ; Created By:   Tiffany Connolly; Reaction Status:   Active ; Category:   Food ; Substance:   Milk Products ; Type:   Allergy ; Updated By:   Tiffany Connolly; Reviewed Date:   4/20/2020 10:43 AM CDT        Medication List   (As Of: 4/20/2020 10:43:16 AM CDT)   Prescription/Discharge Order    ALPRAZolam  :   ALPRAZolam ; Status:   Prescribed ; Ordered As Mnemonic:   ALPRAZolam 0.5 mg oral tablet ; Simple Display Line:   0.5 mg, 1 tab(s), Oral, q6 hrs, PRN: for anxiety, 30 tab(s), 0 Refill(s) ; Ordering Provider:   Speedy Bill MD; Catalog Code:   ALPRAZolam ; Order Dt/Tm:   4/13/2020 12:19:03 PM CDT           amphetamine-dextroamphetamine  :   amphetamine-dextroamphetamine ; Status:   Prescribed ; Ordered As Mnemonic:   Adderall 10 mg oral tablet ; Simple Display Line:   10 mg, 1 tab(s), Oral, qpm, 30 tab(s), 0 Refill(s) ; Ordering Provider:   Gabriel Teixeira MD; Catalog Code:   amphetamine-dextroamphetamine ; Order Dt/Tm:   3/23/2020 11:52:04 AM CDT          amphetamine-dextroamphetamine  :   amphetamine-dextroamphetamine ; Status:   Prescribed ; Ordered As Mnemonic:   Adderall XR 30 mg oral capsule, extended release ; Simple Display Line:   30 mg, 1 cap(s), Oral, qam, 30 cap(s), 0 Refill(s) ; Ordering Provider:   Gabriel Teixeira MD; Catalog Code:   amphetamine-dextroamphetamine ; Order Dt/Tm:   3/23/2020 11:52:09 AM CDT          zolpidem  :   zolpidem ; Status:   Prescribed ; Ordered As Mnemonic:   zolpidem 5 mg oral tablet ; Simple Display Line:   1 tab(s), Oral, qhs, PRN: AS NEEDED FOR SLEEP, 30 tab(s), 0 Refill(s) ; Ordering Provider:   Gabriel Teixeira MD; Catalog Code:   zolpidem ; Order Dt/Tm:   3/23/2020 11:51:55 AM CDT

## 2022-02-16 NOTE — TELEPHONE ENCOUNTER
"---------------------  From: Lali Bassett CMA   To: Gabriel Teixeira MD;     Sent: 5/7/2019 11:44:52 AM CDT  Subject: General Message     Phone Message    PCP:   ARACELIS     Time of Call:  _       Person Calling:  Jackie  Phone number:  [phone]    Returned call at: _    Note:   Pt called to say that she thinks that the new anxiety medicine is working but she has noticed that in the afternoons she isn't able to \"focus\"... she is wondering if this is a side effect ?  please advise    Last office visit and reason:  _    Transferred to: _---------------------  From: Gabriel Teixeira MD   To: Lali Bassett CMA;     Sent: 5/7/2019 11:47:24 AM CDT  Subject: RE: General Message     It very well could be a side effect.  I recommend giving her body more time to \"get used\" to the medication.spoke with pt and informed of ARACELIS's suggestion... pt will give it a couple more days and if not getting better she will call the clinic  "

## 2022-02-16 NOTE — NURSING NOTE
Generalized Anxiety Disorder Screening Entered On:  8/11/2021 6:15 PM CDT    Performed On:  8/11/2021 6:14 PM CDT by Jose Daniel Plunkett CMA               JULITO-7   JULITO Nervous, Anxious On Edge :   Several days   JULITO Control Worrying B :   Several days   JULITO Worrying Too Much :   Several days   JULITO Trouble Relaxing :   More than half the days   JULITO Restless :   Not at all   JULITO Easily Annoyed/Irritable :   Not at all   JULITO Afraid :   Not at all   JULITO Total Screening Score :   5    JULITO Difficulty with Work, Home, Others :   Not difficult at all   Jose Daniel Plunkett CMA - 8/11/2021 6:14 PM CDT

## 2022-02-16 NOTE — NURSING NOTE
Comprehensive Intake Entered On:  12/8/2021 1:24 PM CST    Performed On:  12/8/2021 1:20 PM CST by Jose Daniel Plunkett CMA               Summary   Chief Complaint :   Pt here for and ADHD and Anxiety medcheck.   Weight Measured :   135 lb(Converted to: 135 lb 0 oz, 61.235 kg)    Height Measured :   66 in(Converted to: 5 ft 6 in, 167.64 cm)    Body Mass Index :   21.79 kg/m2   Body Surface Area :   1.69 m2   Systolic Blood Pressure :   134 mmHg (HI)    Diastolic Blood Pressure :   78 mmHg   Mean Arterial Pressure :   97 mmHg   Peripheral Pulse Rate :   80 bpm   BP Site :   Right arm   Temperature Tympanic :   98.8 DegF(Converted to: 37.1 DegC)    Respiratory Rate :   16 br/min   Jose Daniel Plunkett CMA - 12/8/2021 1:20 PM CST   Health Status   Allergies Verified? :   Yes   Medication History Verified? :   Yes   Medical History Verified? :   Yes   Pre-Visit Planning Status :   Not completed   Tobacco Use? :   Current every day smoker   Jose Daniel Plunkett CMA - 12/8/2021 1:20 PM CST   Meds / Allergies   (As Of: 12/8/2021 1:24:53 PM CST)   Allergies (Active)   Concerta  Estimated Onset Date:   Unspecified ; Created By:   Lali Bassett CMA; Reaction Status:   Active ; Category:   Drug ; Substance:   Concerta ; Type:   Allergy ; Updated By:   Lali Bassett CMA; Reviewed Date:   12/8/2021 1:23 PM CST      Glutens  Estimated Onset Date:   Unspecified ; Created By:   Tiffany Connolly; Reaction Status:   Active ; Category:   Food ; Substance:   Glutens ; Type:   Allergy ; Updated By:   Tiffany Connolly; Reviewed Date:   12/8/2021 1:23 PM CST      Milk Products  Estimated Onset Date:   Unspecified ; Created By:   Tiffany Connolly; Reaction Status:   Active ; Category:   Food ; Substance:   Milk Products ; Type:   Allergy ; Updated By:   Tiffany Connolly; Reviewed Date:   12/8/2021 1:23 PM CST        Medication List   (As Of: 12/8/2021 1:24:53 PM CST)   Prescription/Discharge Order    amphetamine-dextroamphetamine  :   amphetamine-dextroamphetamine ;  "Status:   Prescribed ; Ordered As Mnemonic:   Adderall 30 mg oral tablet ; Simple Display Line:   30 mg, 1 tab(s), Oral, bid, fill on or after 12/7/2021, 60 tab(s), 0 Refill(s) ; Ordering Provider:   Gabriel Abarca PA-C; Catalog Code:   amphetamine-dextroamphetamine ; Order Dt/Tm:   12/7/2021 10:43:20 AM CST          amphetamine-dextroamphetamine  :   amphetamine-dextroamphetamine ; Status:   Prescribed ; Ordered As Mnemonic:   Adderall 30 mg oral tablet ; Simple Display Line:   30 mg, 1 tab(s), Oral, bid, fill on or after 10/8/2021, 60 tab(s), 0 Refill(s) ; Ordering Provider:   Gabriel Abarca PA-C; Catalog Code:   amphetamine-dextroamphetamine ; Order Dt/Tm:   9/23/2021 4:58:20 PM CDT          ALPRAZolam  :   ALPRAZolam ; Status:   Prescribed ; Ordered As Mnemonic:   ALPRAZolam 0.5 mg oral tablet ; Simple Display Line:   See Instructions, 1 tab(s) Oral qam and in afternoon, 2 tabs qhs  fill on or after 10/8/2021, PRN: for anxiety, 120 EA, 2 Refill(s) ; Ordering Provider:   Gabriel Abarca PA-C; Catalog Code:   ALPRAZolam ; Order Dt/Tm:   9/23/2021 4:55:59 PM CDT          SUMAtriptan  :   SUMAtriptan ; Status:   Prescribed ; Ordered As Mnemonic:   Imitrex 50 mg oral tablet ; Simple Display Line:   50 mg, 1 tab(s), Oral, once, may repeat dose in 2 hours if needed, PRN: for migraine headache, 9 tab(s), 1 Refill(s) ; Ordering Provider:   Gabriel Abarca PA-C; Catalog Code:   SUMAtriptan ; Order Dt/Tm:   8/11/2021 6:07:06 PM CDT            Social History   Social History   (As Of: 12/8/2021 1:24:53 PM CST)   Alcohol:        Past   (Last Updated: 10/11/2018 3:46:51 PM CDT by Gabriel Abarca PA-C)          Tobacco:        4 or less cigarettes(less than 1/4 pack)/day in last 30 days   Comments:  6/22/2021 7:54 AM - Jose Daniel Plunkett CMA: Pt states she is \"down to 2 cigarettes a day\"   (Last Updated: 6/22/2021 7:54:08 AM CDT by Jose Daniel Plunkett CMA)          Electronic Cigarette/Vaping:        Electronic Cigarette Use: Never.  "  (Last Updated: 3/23/2021 4:11:19 PM CDT by Jose Daniel Plunkett CMA)          Substance Abuse:        Never   (Last Updated: 10/11/2018 3:45:06 PM CDT by Gabriel Abarca PA-C)          Employment/School:        Employed, Work/School description: dental assistant, small business owner.   (Last Updated: 10/11/2018 3:46:38 PM CDT by Gabriel Abarca PA-C)          Home/Environment:        Marital status: .  Spouse/Partner name: Jose Miguel.  Lives with Self, Children.  1 children.   (Last Updated: 10/11/2018 3:45:57 PM CDT by Gabriel Abarca PA-C)          Nutrition/Health:        Diet restrictions: dairy.   (Last Updated: 10/16/2018 2:34:54 PM CDT by Ann Llanes)          Exercise:        Exercise frequency: 3-4 times/week.   (Last Updated: 10/11/2018 3:45:19 PM CDT by Gabriel Abarca PA-C)

## 2022-02-16 NOTE — TELEPHONE ENCOUNTER
---------------------  From: Lali Bassett CMA   To: HEATHER SALMON    Sent: 11/10/2020 10:16:20 AM CST  Subject: prescription     Heather    Your refill has been sent to the pharmacy.  Have a great day    ROB Saha with Dr Teixeira

## 2022-02-16 NOTE — TELEPHONE ENCOUNTER
Entered by Jose Daniel Plunkett CMA on October 23, 2020 10:14:39 AM CDT  PCP:   ARACELIS    Medication:   adderall 10 mg  Last Filled:  9/21/20    Quantity:  30  Refills:  0    Date of last office visit and reason:   9/21/20 ADHD    Date of last Med Check / Px:     Date of last labs pertaining to condition:      Note:  Please advise on refills.  CSA signed 12/27/18  Jose Daniel Plunkett CMA    Return to Clinic order placed?  yes-pt due 1/2021    Resource:   _  Phone:   _  Fax:  _          ---------------------  From: HEATHER SALMON  To: [facility]  Sent: 10/22/2020 04:12 p.m. CDT  Subject: Prescription renewal request  HEATHER SALMON is requesting renewal of the prescription(s) below and has submitted the following details:  Prescription(s) to be renewed  Medication: Adderall 10 mg oral tablet  Dose: 1 tab(s)  Frequency: once a day (in the evening)  Rx date: 09/21/2020  Prescribed by: Gabriel Teixeira MD  Reason for renewal:   Quantity:   Medication: Adderall XR 30 mg oral capsule, extended release  Dose: 1 cap(s)  Frequency: every morning  Rx date: 09/21/2020  Prescribed by: Gabriel Teixeira MD  Reason for renewal:   Quantity:   Delivery option  Send to my pharmacy  [facility] [place] [address]  Contact Information  By Secure Message---------------------  From: Jose Daniel Plunkett CMA ([employer] Pool (32224_[facility]))   To: [employer] (32224_[facility])   ;     Sent: 10/23/2020 10:14:56 AM CDT  Subject: FW: Prescription renewal request---------------------  From: Lali Bassett CMA ([employer] (32224_[facility])   )   To: Gabriel Teixeira MD;     Sent: 10/26/2020 9:04:52 AM CDT  Subject: FW: Prescription renewal request---------------------  From: Lali Bassett CMA ([facility] Pool (32224_[facility])   )   To: HEATHER SALMON    Sent: 10/26/2020 2:45:37 PM CDT  Subject: RE: Prescription renewal request     Heather    Your refills have been sent to the  pharmacy

## 2022-02-16 NOTE — TELEPHONE ENCOUNTER
---------------------  From: Asif Chambers LPN   To: ARACELIS Message Pool ([id number]_[facility])   ;     Sent: 3/22/2021 1:47:08 PM CDT  Subject: General Message     Medication Refill Approval Required    PCP:   [facility]    Medication:   _ PSYCH MEDS  Last Filled:  _    Quantity:  _  Refills:  _    CSA on file?   _     Return to Clinic order placed?  _ YES    Date of last office visit and reason:   _ 9-2020, MED CHECK    Date of last labs pertaining to condition:  _    Note:  _ Patient has appt scheduled for 3-23-21 @ 420pm and would like to be able to  meds prior to working all day tomorrow.      Resource:   _    Phone:   _ [phone]

## 2022-02-16 NOTE — TELEPHONE ENCOUNTER
---------------------  From: Lali Bassett CMA   To: Speedy Bill MD;     Sent: 2020 11:08:39 AM CDT  Subject: refill request-ARACELIS pt     PCP:   ARACELIS    Medication:   alprazolam  Last Filled:  3/17/20    Quantity:  30  Refills:  0      Date of last office visit and reason:   20      Note:  [facility]    Pharmacy: _    Resource:   Jackie  Phone:   [phone]  ** Submitted: **  Order:ALPRAZolam (ALPRAZolam 0.5 mg oral tablet)  1 tab(s)  Oral  q6 hrs  Qty:  30 tab(s)        Refills:  0          Substitutions Allowed     PRN  for anxiety      Route To Pharmacy - [facility]    Signed by Speedy Bill MD  2020 5:19:00 PM---------------------  From: Speedy Bill MD   To: Lali Bassett CMA;     Sent: 2020 12:19:34 PM CDT  Subject: RE: refill request-ARACELIS ptcontacted pt via secure voicemail that rx was sent to the pharmacy

## 2022-02-16 NOTE — TELEPHONE ENCOUNTER
---------------------  From: Lali Bassett CMA   To: Gabriel Teixeira MD;     Sent: 2/20/2019 10:41:40 AM CST  Subject: panic attacks     Pt called to say that what was prescribed yesterday for her anxiety attacks is not working at all      Jackie  [phone]---------------------  From: Gabriel Teixeira MD   To: Lali Bassett CMA;     Sent: 2/20/2019 1:57:33 PM CST  Subject: RE: panic attacks     Tell her it is specifically for anxiety and panic attacks.  Tell her to take it three times a day for a few days and give it time to kick in.Pt informed

## 2022-02-16 NOTE — PROGRESS NOTES
Patient:   HEATHER SALMON            MRN: [id number]            FIN: [id number]               Age:   38 years     Sex:  Female     :  1980   Associated Diagnoses:   Generalized anxiety disorder; Insomnia   Author:   Gabriel Abarca PA-C      Chief Complaint   10/11/2018 2:59 PM CDT   Pt here for sleep issues      History of Present Illness   for several months has had trouble falling asleep  has tried nortryptyline, buspirone, paroxetine, trazodone  has also tried melatonin, Advil PM, Zyquil  has also tried exercising  she wonders if this could be pre-menopausal sxs  has been on 25 mg sertraline for a few months, had tried increasing the dose but it did not help  has also used 0.5 mg lorazepam prn  is also on Adderall  usually seen at [facility] in [place]         Review of Systems   Constitutional:  Negative.    Ear/Nose/Mouth/Throat:  Negative.    Respiratory:  Negative.    Gastrointestinal:  Negative.       Health Status   Allergies:    Allergic Reactions (Selected)  Severity Not Documented  Concerta (No reactions were documented)   Medications:  (Selected)   Documented Medications  Documented  Adderall XR 30 mg oral capsule, extended release: = 1 cap(s) ( 30 mg ), Oral, qam, 0 Refill(s), Type: Maintenance  LORazepam 0.5 mg oral tablet: = 1 tab(s) ( 0.5 mg ), Oral, tid, 0 Refill(s), Type: Maintenance  sertraline 25 mg oral tablet: = 1 tab(s) ( 25 mg ), Oral, daily, 0 Refill(s), Type: Maintenance,    Medications          *denotes recorded medication          *LORazepam 0.5 mg oral tablet: 0.5 mg, 1 tab(s), Oral, tid, 0 Refill(s).          *Adderall XR 30 mg oral capsule, extended release: 30 mg, 1 cap(s), Oral, qam, 0 Refill(s).          *sertraline 25 mg oral tablet: 25 mg, 1 tab(s), Oral, daily, 0 Refill(s).        Histories   Past Medical History:    No active or resolved past medical history items have been selected or recorded.   Family History:    No family history items have been selected or  recorded.   Procedure history:    No active procedure history items have been selected or recorded.   Social History:             No active social history items have been recorded.      Physical Examination   Vital Signs   10/11/2018 2:59 PM CDT Peripheral Pulse Rate 90 bpm    Systolic Blood Pressure 136 mmHg  HI    Diastolic Blood Pressure 76 mmHg    Mean Arterial Pressure 96 mmHg    BP Site Left arm    Oxygen Saturation 99 %      Measurements from flowsheet : Measurements   10/11/2018 2:59 PM CDT Height Measured - Standard 66 in    Weight Measured - Standard 180.6 lb    BSA 1.95 m2    Body Mass Index 29.15 kg/m2  HI      General:  No acute distress.    HENT:  Tympanic membranes are clear, No pharyngeal erythema, No sinus tenderness.    Neck:  Supple, Non-tender, No lymphadenopathy.    Respiratory:  Lungs are clear to auscultation.    Cardiovascular:  Normal rate, Regular rhythm, No murmur.    Psychiatric:  Appropriate mood & affect.       Impression and Plan   Diagnosis     Generalized anxiety disorder (JOE70-PP F41.1).     Insomnia (IAS44-NU G47.00).     Summary:  she has failed many medications to help with her insomnia, will do a trial of zolpidem qhs prn to see if that helps her sleep  follow up in 4-6 weeks here or at her regular clinic for a recheck,  we will have her sign a records release form so we can get her records from [facility] in [place]  if her sxs are due to pre-menopause, stabilizing her hormones with BCP would also be a consideration.    Orders     Orders   Pharmacy:  zolpidem 5 mg oral tablet (Prescribe): = 1 tab(s) ( 5 mg ), PO, Once a day (at bedtime), PRN: for sleep, # 30 tab(s), 1 Refill(s), Type: Maintenance, Pharmacy: [facility], 1 tab(s) Oral hs,PRN:for sleep.     Orders   Charges (Evaluation and Management):  87070 office outpatient new 20 minutes (Charge) (Order): Quantity: 1, Insomnia  Generalized anxiety disorder.

## 2022-02-16 NOTE — TELEPHONE ENCOUNTER
---------------------  From: Marli Abebe CMA (eRx Pool (32224_[facility]))   To: Advanced Practice Provider Pool (32224_[facility]);     Sent: 3/22/2021 7:08:11 AM CDT  Subject: FW: Prescription renewal request     ARACELIS out of clinic      Last visit 20  RTC overdue     Adderall 10m21 #30, 0 refills  Adderall 30m21 #30, 0 refills  alprazolam 0.5m21 #60, 0 refills    ---------------------  From: HEATHER SALMON  To: [facility]  Sent: 2021 05:02 p.m. CDT  Subject: Prescription renewal request  HEATHER SALMON is requesting renewal of the prescription(s) below and has submitted the following details:  Prescription(s) to be renewed  Medication: Adderall 10 mg oral tablet  Dose: 1 tab(s)  Frequency: once a day (in the evening)  Rx date: 2021  Prescribed by: Gabriel Teixeira MD  Reason for renewal:   Quantity:   Medication: Adderall XR 30 mg oral capsule, extended release  Dose: 1 cap(s)  Frequency: every morning  Rx date: 2021  Prescribed by: Gabriel Teixeira MD  Reason for renewal:   Quantity:   Medication: ALPRAZolam 0.5 mg oral tablet  Dose: 1 tab(s)  Frequency: every 6 hours as needed  Rx date: 2021  Prescribed by: Gabriel Teixeira MD  Reason for renewal:   Quantity:   Delivery option  [place] [address]  Contact Information  Home Phone: [phone]  Mobile Phone: [phone]---------------------  From: Gabriel Abarca PA-C (Advanced Practice Provider Pool (32224_[facility]))   To: eRx Pool (32224_[place] [facility] [place]);     Sent: 3/22/2021 7:23:41 AM CDT  Subject: RE: Prescription renewal request     Request denied, was supposed to return to clinic in  for med check,  she is now overdue by 2 months  she needs clinic visit---------------------  From: Marli Abebe CMA (eRx Pool (32224_[facility]))   To: HEATHER SALMON    Sent: 3/22/2021 7:26:57 AM  CDT  Subject: RE: Prescription renewal request         It looks like you were due for a medication follow up in January. Please schedule this to get further refills.

## 2022-02-16 NOTE — TELEPHONE ENCOUNTER
---------------------  From: Mateo HARPER, Josefa RICHARDS (eRx Pool (32224_[facility]))   To: HEATHER SALMON    Sent: 11/10/2020 8:12:25 AM CST  Subject: RE: Prescription renewal request     Yefri Mejia,    Your Buspirone was filled for a 6 month supply on 11/4/2020. I will forward your request for the Alprazolam to Dr. Teixeira to fill as he sees fit.    Thank you,    Josefa Galindo RN      ---------------------  From: HEATHER SALMON  To: [facility]  Sent: 11/10/2020 07:55 a.m. CST  Subject: Prescription renewal request  HEATHER SALMON is requesting renewal of the prescription(s) below and has submitted the following details:  Prescription(s) to be renewed  Medication: busPIRone 10 mg oral tablet  Dose: 1 tab(s)  Frequency: 3 times a day  Rx date: 11/04/2020  Prescribed by: Gabriel Teixeira MD  Reason for renewal:   Quantity:   Medication: ALPRAZolam 0.5 mg oral tablet  Dose: 1 tab(s)  Frequency: every 6 hours as needed  Rx date: 10/14/2020  Prescribed by: Gabriel Teixeira MD  Reason for renewal:   Quantity:   Delivery option  Send to my pharmacy  [facility] [address]  Contact Information  By Secure Message

## 2022-02-16 NOTE — TELEPHONE ENCOUNTER
---------------------  From: Mateo HARPER, Josefa RICHARDS (Phone Messages Pool ([id number]_[facility]))   To: JACKIE SALMON    Sent: 12/7/2021 8:26:01 AM CST  Subject: RE: Prescription renewal request     Yefri Mejia,    You are due for an appointment to have your medications refilled.    Please call [phone] or message us back to help you set up an appointment time that works for you.    Thank you,    Josefa Galindo RN      ---------------------  From: JACKIE SALMON  To: [facility]  Sent: 12/07/2021 06:00 a.m. CST  Subject: FW: Prescription renewal request  I was checking in on my medication renewal.  Thanks  Jackie salmon  ---------------------  From: JACKIE SALMON  To: [facility]  Sent: 12/05/2021 04:55 p.m. CST  Subject: Prescription renewal request  JACKIE SALMON is requesting renewal of the prescription(s) below and has submitted the following details:  Prescription(s) to be renewed  Medication: ALPRAZolam 0.5 mg oral tablet  Dose: See Instructions  Frequency: as needed  Rx date: 09/23/2021  Prescribed by: Gabriel Abarca PA-C  Reason for renewal:  Quantity:  Medication: Adderall 30 mg oral tablet  Dose: 1 tab(s)  Frequency: 2 times a day  Rx date: 09/23/2021  Prescribed by: Gabriel Abarca PA-C  Reason for renewal:  Quantity:  Delivery option  [place] [address]  Phone: [phone]  Contact Information  By Secure Message  Comments  I do not need to  the anxiety for a couple of weeks. I was just hoping to get them both sent there at the same time. I will need the Adderall in a couple of days If I am able to? Thank you for your time, Jackie Salmon

## 2022-02-16 NOTE — TELEPHONE ENCOUNTER
---------------------  From: Lali Bassett CMA (eRx Pool (32224_[facility]))   To: JACKIE SALMON    Sent: 8/19/2020 10:02:34 AM CDT  Subject: RE: Prescription renewal request     Jackie [place] refills have been sent to the pharmacy    ROB Saha with Dr Teixeira      ---------------------  From: JACKIE SALMON  To: [facility]  Sent: 08/17/2020 01:15 p.m. CDT  Subject: Prescription renewal request  JACKIE SALMON is requesting renewal of the prescription(s) below and has submitted the following details:  Prescription(s) to be renewed  Medication: Adderall 10 mg oral tablet  Dose: 1 tab(s)  Frequency: once a day (in the evening)  Rx date: 07/20/2020  Prescribed by: Gabriel Teixeira MD  Reason for renewal:   Quantity:   Medication: Adderall XR 30 mg oral capsule, extended release  Dose: 1 cap(s)  Frequency: every morning  Rx date: 07/20/2020  Prescribed by: Gabriel Teixeira MD  Reason for renewal:   Quantity:   Medication: zolpidem 5 mg oral tablet  Dose: 1 tab(s)  Frequency: at bedtime as needed  Rx date: 07/20/2020  Prescribed by: Gabriel Teixeira MD  Reason for renewal:   Quantity:   Delivery option  [place] [address]  Contact Information  By Secure Message

## 2022-02-16 NOTE — TELEPHONE ENCOUNTER
---------------------  From: Lali Bassett CMA (Phone Messages Pool (47924_[facility]))   To: [employer] Message Pool (83424_[facility]);     Sent: 7/26/2021 8:19:58 AM CDT  Subject: FW: Meds error           ---------------------  From: JACKIE SALMON  To: [facility]  Sent: 07/24/2021 09:15 p.m. CDT  Subject: Meds error  I went through my med. record and it shows that you gave me 2 months of the 20mg. adhd. I never got the second bottle from [facility] pharmacy. He flat out told me he was not giving it to me. I printed it off for this month, it says I can pick it up 7/20/21 or after. Please explain to me why this keeps happening?    Jackie Zhong? this is the correct pickup date right?  Jose Daniel Plunkett CMA---------------------  From: Jose Daniel Plunkett CMA ([employer] Message Pool (97124_[facility]))   To: Gabriel Abarca PA-C;     Sent: 7/26/2021 8:22:25 AM CDT  Subject: FW: Meds error---------------------  From: Gabriel Abarca PA-C   To: [employer] Pool ([id number]_[facility]);     Sent: 7/26/2021 8:25:31 AM CDT  Subject: RE: Meds error     yes, she had a second Rx that she should have been able to  on 7/20.  Can you please call [facility] and see what the problem is?There has been another encounter regarding this medication.  I will close this encounter and continue with new encounter.  Jose Daniel Plunkett CMA
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
## 2022-02-16 NOTE — LETTER
(Inserted Image. Unable to display)     August 21, 2020      HEATHER SALMON  [address] [place], [place] [id number]          Dear HEATHER,      Thank you for selecting [facility] for your [facility] needs.      Our records indicate you are due for the following services:     Follow-up office visit / Medication Check.      To schedule an appointment or if you have further questions, please contact your primary clinic:   [facility]       [phone]   [facility]       [phone]              [facility]     [phone]      Powered by [employer]    Sincerely,    Gabriel Teixeira MD

## 2022-02-16 NOTE — NURSING NOTE
Generalized Anxiety Disorder Screening Entered On:  6/22/2021 7:59 AM CDT    Performed On:  6/22/2021 7:59 AM CDT by Jose Daniel Plunkett CMA               JULITO-7   JULITO Nervous, Anxious On Edge :   Several days   JULITO Control Worrying B :   More than half the days   JULITO Worrying Too Much :   Several days   JULITO Trouble Relaxing :   Several days   JULITO Restless :   Several days   JULITO Easily Annoyed/Irritable :   Not at all   JULITO Afraid :   Several days   JULITO Total Screening Score :   7    JULITO Difficulty with Work, Home, Others :   Not difficult at all   Jose Daniel Plunkett CMA - 6/22/2021 7:59 AM CDT

## 2022-02-16 NOTE — PROGRESS NOTES
Patient:   HEATHER SALOMN            MRN: [id number]            FIN: [id number]               Age:   40 years     Sex:  Female     :  1980   Associated Diagnoses:   ADHD   Author:   Gabriel Teixeira MD      Impression and Plan   Diagnosis     ADHD (QCT17-JY F90.9).     Course:  Improving.    Orders     Orders   Charges (Evaluation and Management):  27012 office outpatient visit 15 minutes (Charge) (Order): Quantity: 1, ADHD.     Orders (Selected)   Prescriptions  Prescribed  Adderall 10 mg oral tablet: = 1 tab(s) ( 10 mg ), Oral, qpm, # 30 tab(s), 0 Refill(s), Type: Maintenance, Pharmacy: [facility], 1 tab(s) Oral qpm, 66, in, 20 11:05:00 CDT, Height Measured, 172, lb, 20 8:23:00 CST, Weight Measured  Adderall XR 30 mg oral capsule, extended release: = 1 cap(s) ( 30 mg ), Oral, qam, # 30 cap(s), 0 Refill(s), Type: Maintenance, Pharmacy: [facility], 1 cap(s) Oral qam, 66, in, 20 11:05:00 CDT, Height Measured, 172, lb, 20 8:23:00 CST, Weight Measured.        Visit Information      Date of Service: 2020 07:46 am  Performing Location: [facility]  Encounter#: [id number]      Primary Care Provider (PCP):  Gabriel Teixeira MD    NPI# [id number]      Referring Provider:  Gabriel Teixeira MD# [id number]   Visit type:  Video Visit via Ente[employer].    Participants in room during visit:  _Patient only   Accompanied by:  No one.    Source of history:  Self.    Location of patient:  _home  Location of provider:  _ Clinic  Video Start Time:  _1200  Video End Time:   _1218    Today's visit was conducted via video conference due to the COVID-19 pandemic.  The patient's consent to proceed with a video visit has been obtained and documented.   Referral source:  Self.    History limitation:  None.       Chief Complaint   2020 12:57 PM CDT   consent for video visit for refills        History of Present Illness   Patient is a _40 year old _f who is being  evaluated via a billable video visit.  Needs to renew Adderall Rx.  Medication is working well and she is satisfied with the current dosing.  Anxiety problems have settled down since her family crisis has resolved.  No new medical issues identified.      Review of Systems   Constitutional:  Negative.    Eye:  Negative.    Ear/Nose/Mouth/Throat:  Negative.    Respiratory:  Negative.    Cardiovascular:  Negative.    Gastrointestinal:  Negative.    Genitourinary:  Negative.    Immunologic:  Negative.    Musculoskeletal:  Negative.    Integumentary:  Negative.    Neurologic:  Negative.    Psychiatric:  Negative.       Health Status   Allergies:    Allergic Reactions (Selected)  Severity Not Documented  Concerta (No reactions were documented)  Glutens (No reactions were documented)  Milk Products (No reactions were documented)   Medications:  (Selected)   Prescriptions  Prescribed  ALPRAZolam 0.5 mg oral tablet: = 1 tab(s) ( 0.5 mg ), Oral, q6 hrs, PRN: for anxiety, # 60 tab(s), 0 Refill(s), Type: Soft Stop, Pharmacy: [facility], 1 tab(s) Oral q6 hrs,PRN:for anxiety, 66, in, 09/02/20 11:05:00 CDT, Height Measured, 172, lb, 01/30/20 8:23:00 CST, Weight...  Adderall 10 mg oral tablet: = 1 tab(s) ( 10 mg ), Oral, qpm, # 30 tab(s), 0 Refill(s), Type: Maintenance, Pharmacy: [facility], 1 tab(s) Oral qpm, 66, in, 09/02/20 11:05:00 CDT, Height Measured, 172, lb, 01/30/20 8:23:00 CST, Weight Measured  Adderall XR 30 mg oral capsule, extended release: = 1 cap(s) ( 30 mg ), Oral, qam, # 30 cap(s), 0 Refill(s), Type: Maintenance, Pharmacy: [facility], 1 cap(s) Oral qam, 66, in, 09/02/20 11:05:00 CDT, Height Measured, 172, lb, 01/30/20 8:23:00 CST, Weight Measured  busPIRone 10 mg oral tablet: = 1 tab(s) ( 10 mg ), Oral, tid, # 90 tab(s), 1 Refill(s), Type: Maintenance, Pharmacy: [facility], 1 tab(s) Oral tid, 66, in, 04/20/20 10:42:00 CDT, Height Measured, 172, lb, 01/30/20 8:23:00 CST, Weight  Measured  zolpidem 5 mg oral tablet: = 1 tab(s), Oral, qhs, PRN: AS NEEDED FOR SLEEP, # 7 tab(s), 0 Refill(s), Type: Soft Stop, Pharmacy: [facility], 1 tab(s) Oral qhs,PRN:AS NEEDED FOR SLEEP, 66, in, 20 11:05:00 CDT, Height Measured, 172, lb, 20 8:23:00 CST, Weight M...,    Medications          *denotes recorded medication          ALPRAZolam 0.5 mg oral tablet: 0.5 mg, 1 tab(s), Oral, q6 hrs, PRN: for anxiety, 60 tab(s), 0 Refill(s).          Adderall 10 mg oral tablet: 10 mg, 1 tab(s), Oral, qpm, 30 tab(s), 0 Refill(s).          Adderall XR 30 mg oral capsule, extended release: 30 mg, 1 cap(s), Oral, qam, 30 cap(s), 0 Refill(s).          busPIRone 10 mg oral tablet: 10 mg, 1 tab(s), Oral, tid, 90 tab(s), 1 Refill(s).          zolpidem 5 mg oral tablet: 1 tab(s), Oral, qhs, PRN: AS NEEDED FOR SLEEP, 7 tab(s), 0 Refill(s).       Problem list:    All Problems  ADHD / SNOMED CT 5200850986 / Confirmed  ADHD - Attention deficit disorder with hyperactivity / SNOMED CT 3234195454 / Confirmed  JULITO - Generalized anxiety disorder / SNOMED CT 351375160 / Confirmed  Generalized anxiety disorder / SNOMED CT 29257049 / Confirmed  Hip pain, bilateral / SNOMED CT 19839143 / Confirmed  Insomnia / SNOMED CT 305892365 / Confirmed  Low back pain / SNOMED CT 843935123 / Confirmed  Panic attacks / SNOMED CT 610004993 / Confirmed      Histories   Past Medical History:    Active  ADHD - Attention deficit disorder with hyperactivity (6311949442)  JULITO - Generalized anxiety disorder (425902551)  Low back pain (234034108)  Hip pain, bilateral (14202838)   Family History:    Attention deficit disorder  Brother  Anxiety  Father  Hypertension  Father  Heart disease  Grandfather (M)  Pancreatic cancer  Grandfather (M)  Migraine  Mother  Lyme disease  Sister  Anxiety  Father  Colon cancer  Grandmother (P) ()  Comments:  10/29/2018 12:11 PM Tiffany Hawthorne  70s  ADHD (attention deficit hyperactivity  disorder)....  Brother  Insomnia  Mother     Procedure history:    Cyst of breast (SNOMED CT 9910574306) on 7/31/2014 at 34 Years.  Comments:  10/29/2018 12:08 PM WILMER Franklin Cathleen  Tiffany  Right  Biopsy (SNOMED CT 531085697) on 7/18/2014 at 34 Years.  Comments:  10/29/2018 11:54 AM WILMER Franklin Tiffany Connolly  Benign fibroadipose tissue.    10/29/2018 11:52 AM REYNAJANEE Tiffany Slater  Needle biopsy of right breast.  Caesarean section (SNOMED CT 60707980).  Tubal ligation (SNOMED CT 372551615).  Tooth extraction, multiple (SNOMED CT 88882922).   Social History:        Alcohol Assessment            Past      Tobacco Assessment            Former smoker, quit more than 30 days ago                     Comments:                      10/11/2018 - Tevin GOEL, Gabriel GARCIA                     quit 2010      Substance Abuse Assessment            Never      Employment and Education Assessment            Employed, Work/School description: dental assistant, small business owner.      Home and Environment Assessment            Marital status: .  Spouse/Partner name: Jose Miguel.  Lives with Self, Children.  1 children.      Nutrition and Health Assessment            Diet restrictions: dairy.      Exercise and Physical Activity Assessment            Exercise frequency: 3-4 times/week.        Physical Examination   General:  Alert and oriented, No acute distress.    Eye:  Pupils are equal, round and reactive to light, Extraocular movements are intact, Normal conjunctiva.    Respiratory:  Respirations are non-labored.    Integumentary:  Warm, Dry, Pink.    Neurologic:  Normal motor function.    Psychiatric:  Cooperative, Appropriate mood & affect, Normal judgment, Non-suicidal.         Mood and affect: Calm.         Behavior: Relaxed.         Judgment: Able to make sensible decisions, Appropriate in social situations.         Thought process: Appropriate.       Health Maintenance      Recommendations     Pending (in the next year)        Due             Influenza Vaccine due  08/31/20  and every 1  year(s)           Cervical Cancer Screen (if sexually active) due  09/21/20  Variable frequency           HIV Screen (if sexually active) (Female) due  09/21/20  and every 1  year(s)           Intimate Partner Violence Screening due  09/21/20  and every 1  year(s)           Lipid Disorders Screen (Female) due  09/21/20  and every 1  year(s)           STD Counseling (if sexually active) (Female) due  09/21/20  and every 1  year(s)           Syphilis Screen (if sexually active) (Female) due  09/21/20  and every 1  year(s)           Type 2 Diabetes Mellitus Screen (Female) due  09/21/20  Variable frequency        Due In Future            Body Mass Index Check (Female) not due until  01/30/21  and every 1  year(s)           High Blood Pressure Screen (Female) not due until  01/30/21  and every 1  year(s)           Alcohol Misuse Screen (Female) not due until  01/30/21  and every 1  year(s)           Depression Screen (Female) not due until  01/30/21  and every 1  year(s)     Satisfied (in the past 1 year)        Satisfied            Alcohol Misuse Screen (Female) on  01/30/20.           Body Mass Index Check (Female) on  01/30/20.           Body Mass Index Check (Female) on  11/21/19.           Depression Screen (Female) on  01/30/20.           Depression Screen (Female) on  01/30/20.           Depression Screen (Female) on  01/30/20.           High Blood Pressure Screen (Female) on  01/30/20.           High Blood Pressure Screen (Female) on  11/21/19.           Influenza Vaccine on  10/28/19.           Tobacco Use Screen (Female) on  09/21/20.           Tobacco Use Screen (Female) on  04/20/20.           Tobacco Use Screen (Female) on  11/21/19.

## 2022-02-16 NOTE — TELEPHONE ENCOUNTER
---------------------  From: Beti Curtis RN (Phone Messages Pool ([id number]_[facility])   To: HEATHER SALMON    Sent: 9/20/2021 4:16:16 PM CDT  Subject: FW: FW: Meds, and missed appointment     Yefri Mejia,     The Moderna must have 4 weeks/ 28 days between the first and second dose.  It looks like you got the first one on 8/19/21 so you would be in the time frame to get your second dose now.  You can call and schedule that whenever you are ready.     Have a great day,  Ayden SHELDON RN         ---------------------  From: HEATHER SALMON  To: [facility]  Sent: 09/20/2021 04:12 p.m. CDT  Subject: RE: FW: Meds, and missed appointment  Yes, I picked up the anxiety meds. Thank you for that. What is the time frame for the second shot?  Thanks,  MS  ---------------------  From: Jossy Hauser MA (Phone Messages Pool ([id number]_[facility]))  To: HEATHER SALMON  Sent: 9/20/2021 8:49:56 AM CDT  Subject: FW: Meds, and missed appointment       Sunday Mejia,       We received your message.  I was reviewing your chart and it looks like Gabriel Abarca PA-C wants to see you again for a follow up med check for your depression/anxiety.  It looks like a refill of the alprazolam was sent in 9/10/2021 with a refill date of 9/17/2021 and that it was sent to [facility].  You can call the clinic to schedule that follow up appointment with Gabriel Abarca as well as your COVID shot.       Sincerely,       Jossy JETER CMA  ---------------------  From: HEATHER SALMON  To: [facility]  Sent: 09/18/2021 05:07 p.m. CDT  Subject: Meds, and missed appointment  Sorry to bother you again but I need some advise about my anxiety med. With my new job I take 1 in the morning & 1 around noon. Sometimes I need one in the late afternoon & 1 at night before bed. 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 I need to stay calm so these kids stay calm. Its hard & I m hoping to overcome this in time. Last week I ran out early & I didn t sleep for days. That made it very hard to work because I was in panic attack for about 4 days. I also realized I forgot to get my 2nd shot. Please tell me its not to late for me to get it?  Thanks for your time,  Jackie Mcdonough

## 2022-02-16 NOTE — NURSING NOTE
Comprehensive Intake Entered On:  4/15/2019 4:17 PM CDT    Performed On:  4/15/2019 4:14 PM CDT by Lali Bassett CMA               Summary   Chief Complaint :   Pt here for med ck   Weight Measured :   172 lb(Converted to: 172 lb 0 oz, 78.02 kg)    Height Measured :   66 in(Converted to: 5 ft 6 in, 167.64 cm)    Body Mass Index :   27.76 kg/m2 (HI)    Body Surface Area :   1.9 m2   Systolic Blood Pressure :   130 mmHg   Diastolic Blood Pressure :   82 mmHg (HI)    Mean Arterial Pressure :   98 mmHg   Peripheral Pulse Rate :   85 bpm   Oxygen Saturation :   99 %   Lali Bassett CMA - 4/15/2019 4:14 PM CDT   Health Status   Allergies Verified? :   Yes   Medication History Verified? :   Yes   Medical History Verified? :   Yes   Pre-Visit Planning Status :   Not completed   Tobacco Use? :   Former smoker   Lali Bassett CMA - 4/15/2019 4:14 PM CDT   Consents   Consent for Immunization Exchange :   Consent Granted   Consent for Immunizations to Providers :   Consent Granted   Lali Bassett CMA - 4/15/2019 4:14 PM CDT   Meds / Allergies   (As Of: 4/15/2019 4:17:50 PM CDT)   Allergies (Active)   Concerta  Estimated Onset Date:   Unspecified ; Created By:   Lali Bassett CMA; Reaction Status:   Active ; Category:   Drug ; Substance:   Concerta ; Type:   Allergy ; Updated By:   Lali Bassett CMA; Reviewed Date:   4/15/2019 4:16 PM CDT      Glutens  Estimated Onset Date:   Unspecified ; Created By:   Tiffany Connolly; Reaction Status:   Active ; Category:   Food ; Substance:   Glutens ; Type:   Allergy ; Updated By:   Tiffany Connolly; Reviewed Date:   4/15/2019 4:16 PM CDT      Milk Products  Estimated Onset Date:   Unspecified ; Created By:   Tiffany Connolly; Reaction Status:   Active ; Category:   Food ; Substance:   Milk Products ; Type:   Allergy ; Updated By:   Tiffany Connolly; Reviewed Date:   4/15/2019 4:16 PM CDT        Medication List   (As Of: 4/15/2019 4:17:50 PM CDT)   Prescription/Discharge Order     amphetamine-dextroamphetamine  :   amphetamine-dextroamphetamine ; Status:   Prescribed ; Ordered As Mnemonic:   Adderall 10 mg oral tablet ; Simple Display Line:   10 mg, 1 tab(s), Oral, qpm, 30 tab(s), 0 Refill(s) ; Ordering Provider:   Gabriel Teixeira MD; Catalog Code:   amphetamine-dextroamphetamine ; Order Dt/Tm:   3/28/2019 12:17:53 PM          amphetamine-dextroamphetamine  :   amphetamine-dextroamphetamine ; Status:   Prescribed ; Ordered As Mnemonic:   Adderall XR 30 mg oral capsule, extended release ; Simple Display Line:   30 mg, 1 cap(s), Oral, qam, 30 cap(s), 0 Refill(s) ; Ordering Provider:   Gabriel Teixeira MD; Catalog Code:   amphetamine-dextroamphetamine ; Order Dt/Tm:   4/2/2019 12:06:26 PM          busPIRone  :   busPIRone ; Status:   Processing ; Ordered As Mnemonic:   busPIRone 10 mg oral tablet ; Ordering Provider:   Gabriel Teixeira MD; Action Display:   Complete ; Catalog Code:   busPIRone ; Order Dt/Tm:   4/15/2019 4:16:13 PM          sertraline  :   sertraline ; Status:   Prescribed ; Ordered As Mnemonic:   sertraline 50 mg oral tablet ; Simple Display Line:   25 mg, 0.5 tab(s), PO, Daily, 135 tab(s), 0 Refill(s) ; Ordering Provider:   Gabriel Teixeira MD; Catalog Code:   sertraline ; Order Dt/Tm:   2/4/2019 12:54:03 PM          zolpidem  :   zolpidem ; Status:   Prescribed ; Ordered As Mnemonic:   zolpidem 5 mg oral tablet ; Simple Display Line:   5 mg, 1 tab(s), PO, Once a day (at bedtime), PRN: for sleep, 30 tab(s), 2 Refill(s) ; Ordering Provider:   Gabriel Teixeira MD; Catalog Code:   zolpidem ; Order Dt/Tm:   4/15/2019 11:53:10 AM          zolpidem  :   zolpidem ; Status:   Completed ; Ordered As Mnemonic:   zolpidem 5 mg oral tablet ; Simple Display Line:   5 mg, 1 tab(s), PO, Once a day (at bedtime), PRN: for sleep, 30 tab(s), 2 Refill(s) ; Ordering Provider:   Gabriel Teixeira MD; Catalog Code:   zolpidem ; Order Dt/Tm:   1/15/2019 3:35:07 PM

## 2022-02-16 NOTE — TELEPHONE ENCOUNTER
---------------------  From: Arnulfo IRAHETAZenaida (Phone Messages Pool ([id number]_[facility]))   To: ARACELIS Message Pool ([id number]_[facility])   ;     Sent: 11/4/2020 8:59:41 AM CST  Subject: CONSUMER MESSAGE FW: Advice ~ anxiety           ---------------------  From: JACKIE SALMON  To: [facility]  Sent: 11/04/2020 08:57 a.m. CST  Subject: Advice ~ anxiety  I m sorry to bother you again about my anxiety issues. I know everyone  is going through issues this year. However dealing with my brother (Tim Quintana) and now my fathers medical scare(Shen Quintana) I am under alot of stess. 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 I wont even get into what my Dentist said to me about my teeth and I have no choice but to take care of my teeth as well. ([facility]). She also told me she was going to contact Dr. Willard about what she thinks could be the reason why my teeth are the way they are. 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 These anxiety meds I m on are the only thing that helps me but I also understand they are a controlled substance. I am more then willing to do what  Im told. I am talking to a therapist as well. I was advised to just open up and ask?  Thanks for your time.  Jackie Salmon.---------------------  From: Lali Bassett CMA (ARACELIS Message Pool ([id number]_[facility])   )   To: Gabriel Teixeira MD;     Sent: 11/4/2020 10:09:06 AM CST  Subject: FW: CONSUMER MESSAGE FW: Advice ~ anxiety---------------------  From: Lali Bassett CMA (As Seen on TV Pool ([id number]_[place])   )   To: JACKIE SALMON    Sent: 11/4/2020 3:54:03 PM CST  Subject: FW: CONSUMER MESSAGE FW: Advice ~ anxiety     Jackie Teixeira says that you can take more of the alprazolam for the weekend but he would like you to  schedule a visit next week to discuss further treatment options.  You can call [phone] to schedule an in person visit or a video visit.    Have a great night  ROB Saha with Dr Teixeira

## 2022-02-16 NOTE — TELEPHONE ENCOUNTER
---------------------  From: Lali Bassett CMA   To: Gabriel Teixeira MD;     Sent: 2019 3:59:31 PM CST  Subject: refill request-adderall 10 and 30 mg     PCP:   ARACELIS    Medication:    adderall 10mg    Medication:    adderall 30 mg     Last Filled:     19    Last Filled:   19          Date of last office visit and reason:   anxiety 19      Note:  Pt will  on Thursday    Pharmacy: [facility]    Resource:   Jackie  Phone:  [phone]

## 2022-02-16 NOTE — TELEPHONE ENCOUNTER
---------------------  From: Serjio Lali RIVAS (Phone Messages Pool ([id number]_[facility]))   To: [employer] Message Pool ([id number]_[facility]);     Sent: 7/7/2021 8:06:13 AM CDT  Subject: FW: Meds           ---------------------  From: JACKIE SALMON  To: [facility]  Sent: 07/06/2021 07:27 p.m. CDT  Subject: Meds  The last time we talked everything seemed better. The last 2 weeks my stress and anxiety are terrible due to my marriage & looking for a job. My marriage is ending and I will need a refill on my anxiety meds with in a few days if this is possible. I have a job interview tomorrow and I sure hope the 20mg will be enough of a dose. I m more worried about my anxiety and keeping calm through my divorce. It will not be an easy one. Please call or message me asap. As soon as I can find the time I plan on seeing a therapist. But right know I need to stay calm for my child.    Thank you,  Jackie Salmon ([facility])---------------------  From: Dimitrios RIVASJose Daniel ([employer] Message Pool ([id number]_[facility]))   To: Gabriel Abarca PA-C;     Sent: 7/7/2021 11:40:03 AM CDT  Subject: FW: Meds---------------------  From: Gabriel Abarca PA-C   To: [employer] Message Pool ([id number]_[facility]);     Sent: 7/7/2021 1:02:42 PM CDT  Subject: RE: Meds     Called patient, informed her that she does have refills left on her alprazolam.  She is unsure about  the 20 mg bid dose of Adderall but she is given reassurance about that and encouraged to try and stay with that.  We may need to increase the dose at a later date.    Her  is having psychological problems right now and she is considering a divorce.  Advised to seek counseling  for herself.  But if she or her family feels unsafe then she is advised to inform the police.noted.  Jose Daniel Plunkett CMA

## 2022-02-16 NOTE — NURSING NOTE
Generalized Anxiety Disorder Screening Entered On:  1/30/2020 9:19 AM CST    Performed On:  1/30/2020 9:18 AM CST by Lali Bassett CMA               Generalized Anxiety Disorder Screening   JULITO Nervous, Anxious On Edge :   More than half the days   JULITO Control Worrying B :   More than half the days   JULITO Worrying Too Much :   Nearly every day   JULITO Restless :   Several days   JULITO Easily Annoyed/Irritable :   Several days   JULITO Afraid :   Several days   JULITO Trouble Relaxing :   More than half the days   JULITO Total Screening Score :   12    JULITO Difficulty with Work, Home, Others :   Somewhat difficult   Lali Bassett CMA - 1/30/2020 9:18 AM CST

## 2022-02-16 NOTE — NURSING NOTE
Depression Screening Entered On:  8/11/2021 6:15 PM CDT    Performed On:  8/11/2021 6:14 PM CDT by Jose Daniel Plunkett CMA               Depression Screening   Little Interest - Pleasure in Activities :   Several days   Feeling Down, Depressed, Hopeless :   Not at all   Initial Depression Screen Score :   1 Score   Poor Appetite or Overeating :   Not at all   Trouble Falling or Staying Asleep :   Several days   Feeling Tired or Little Energy :   Not at all   Feeling Bad About Yourself :   Not at all   Trouble Concentrating :   Several days   Moving or Speaking Slowly :   Not at all   Thoughts Better Off Dead or Hurting Self :   Not at all   Difficulty at Work, Home, Getting Along :   Not difficult at all   Detailed Depression Screen Score :   2    Total Depression Screen Score :   3    Jose Daniel Plunkett CMA - 8/11/2021 6:14 PM CDT

## 2022-02-16 NOTE — TELEPHONE ENCOUNTER
---------------------  From: Dustin Peralta (Phone Messages Pool ([id number]_[facility]))   To: Advanced Practice Provider [place] ([id number]_[facility]);     Sent: 3/22/2021 3:42:30 PM CDT  Subject: Refills      Phone Message    PCP:   ARACELIS (OC) seeing [facility]                             Time of Call:  335pm                                        Person Calling:  Pt   Phone number:  [phone]     Note:   Pt has an appointment tomorrow for a med check w/ [facility] at 420pm. Pt called several times already wanted a refill sent today since she won't have much time to pick them up tomorrow. She wanted this to be sent to a covering provider. Informed her since she is seeing another provider and has an appointment as soon as tomorrow, this probably wont happened. She states that she will keep appointment but would like Rx to be sent today. Rx for Alprazolam, Adderall 10mg and 30mg. Please advise.     Last office visit and reason:  09/21/2021; Video visit for ADHD med check.---------------------  From: Brodie Bar PA-C (Advanced Practice Provider Pool ([id number]_[facility]))   To: Phone Messages Pool ([id number]_[place] [facility] [place]);     Sent: 3/22/2021 4:28:12 PM CDT  Subject: RE: Refills      Can fill at visit tomorrow.    KAHInformed pt that Rx will be filled tomorrow. She is okay with this and expressed understanding. She had no further concerns.

## 2022-02-16 NOTE — NURSING NOTE
Comprehensive Intake Entered On:  6/22/2021 7:58 AM CDT    Performed On:  6/22/2021 7:49 AM CDT by Jose Daniel Plunkett CMA               Summary   Chief Complaint :   Verbal consent given for a video visit. Pt is needing refills on here ADHD and anxiety medications medications.  Pt states since rash has gone away since stopping adderal 30 mg XR.   Height Measured :   66 in(Converted to: 5 ft 6 in, 167.64 cm)    Jose Daniel Plunkett CMA - 6/22/2021 7:49 AM CDT   Health Status   Allergies Verified? :   Yes   Medication History Verified? :   Yes   Medical History Verified? :   Yes   Pre-Visit Planning Status :   Not completed   Tobacco Use? :   Current every day smoker   Tobacco Cessation Review :   Not ready to quit   Jose Daniel Plunkett CMA - 6/22/2021 7:49 AM CDT   Meds / Allergies   (As Of: 6/22/2021 7:58:59 AM CDT)   Allergies (Active)   Concerta  Estimated Onset Date:   Unspecified ; Created By:   Lali Bassett CMA; Reaction Status:   Active ; Category:   Drug ; Substance:   Concerta ; Type:   Allergy ; Updated By:   Lali Bassett CMA; Reviewed Date:   6/22/2021 7:53 AM CDT      Glutens  Estimated Onset Date:   Unspecified ; Created By:   Tiffany Connolly; Reaction Status:   Active ; Category:   Food ; Substance:   Glutens ; Type:   Allergy ; Updated By:   Tiffany Connolly; Reviewed Date:   6/22/2021 7:53 AM CDT      Milk Products  Estimated Onset Date:   Unspecified ; Created By:   Tiffany Connolly; Reaction Status:   Active ; Category:   Food ; Substance:   Milk Products ; Type:   Allergy ; Updated By:   Tiffany Connolly; Reviewed Date:   6/22/2021 7:53 AM CDT        Medication List   (As Of: 6/22/2021 7:58:59 AM CDT)   Prescription/Discharge Order    amphetamine-dextroamphetamine  :   amphetamine-dextroamphetamine ; Status:   Prescribed ; Ordered As Mnemonic:   Adderall 10 mg oral tablet ; Simple Display Line:   10 mg, 1 tab(s), Oral, qpm, fill on or after 6/15/2021, 30 tab(s), 0 Refill(s) ; Ordering Provider:   Gabriel Abarca PA-C;  Catalog Code:   amphetamine-dextroamphetamine ; Order Dt/Tm:   6/15/2021 1:23:43 PM CDT          amphetamine-dextroamphetamine  :   amphetamine-dextroamphetamine ; Status:   Prescribed ; Ordered As Mnemonic:   Adderall XR 30 mg oral capsule, extended release ; Simple Display Line:   30 mg, 1 cap(s), Oral, qam, fill on or after 6/15/2021, 30 cap(s), 0 Refill(s) ; Ordering Provider:   Gabriel Abarca PA-C; Catalog Code:   amphetamine-dextroamphetamine ; Order Dt/Tm:   6/15/2021 1:23:27 PM CDT          ALPRAZolam  :   ALPRAZolam ; Status:   Prescribed ; Ordered As Mnemonic:   ALPRAZolam 0.5 mg oral tablet ; Simple Display Line:   0.5 mg, 1 tab(s), Oral, q6 hrs, PRN: for anxiety, 60 tab(s), 0 Refill(s) ; Ordering Provider:   Gabriel Abarca PA-C; Catalog Code:   ALPRAZolam ; Order Dt/Tm:   6/4/2021 3:41:08 PM CDT          amphetamine-dextroamphetamine  :   amphetamine-dextroamphetamine ; Status:   Prescribed ; Ordered As Mnemonic:   Adderall 10 mg oral tablet ; Simple Display Line:   10 mg, 1 tab(s), Oral, qhs, 30 tab(s), 0 Refill(s) ; Ordering Provider:   Zoie Wilhelm PA-C; Catalog Code:   amphetamine-dextroamphetamine ; Order Dt/Tm:   5/19/2021 11:56:21 AM CDT          amphetamine-dextroamphetamine  :   amphetamine-dextroamphetamine ; Status:   Processing ; Ordered As Mnemonic:   Adderall XR 30 mg oral capsule, extended release ; Ordering Provider:   Zoie Wilhelm PA-C; Action Display:   Complete ; Catalog Code:   amphetamine-dextroamphetamine ; Order Dt/Tm:   6/22/2021 7:51:54 AM CDT          amphetamine-dextroamphetamine  :   amphetamine-dextroamphetamine ; Status:   Processing ; Ordered As Mnemonic:   Adderall XR 30 mg oral capsule, extended release ; Ordering Provider:   Gabriel Abarca PA-C; Action Display:   Complete ; Catalog Code:   amphetamine-dextroamphetamine ; Order Dt/Tm:   6/22/2021 7:51:56 AM CDT          amphetamine-dextroamphetamine  :   amphetamine-dextroamphetamine ; Status:    "Processing ; Ordered As Mnemonic:   Adderall 10 mg oral tablet ; Ordering Provider:   Gabriel Abarca PA-C; Action Display:   Complete ; Catalog Code:   amphetamine-dextroamphetamine ; Order Dt/Tm:   6/22/2021 7:51:59 AM CDT          busPIRone  :   busPIRone ; Status:   Prescribed ; Ordered As Mnemonic:   busPIRone 10 mg oral tablet ; Simple Display Line:   10 mg, 1 tab(s), Oral, tid, 90 tab(s), 1 Refill(s) ; Ordering Provider:   Gabriel Abarca PA-C; Catalog Code:   busPIRone ; Order Dt/Tm:   3/23/2021 4:37:18 PM CDT          zolpidem  :   zolpidem ; Status:   Prescribed ; Ordered As Mnemonic:   zolpidem 5 mg oral tablet ; Simple Display Line:   1 tab(s), Oral, qhs, PRN: AS NEEDED FOR SLEEP, 30 tab(s), 3 Refill(s) ; Ordering Provider:   Gabriel Abarca PA-C; Catalog Code:   zolpidem ; Order Dt/Tm:   3/23/2021 4:37:06 PM CDT            Social History   Social History   (As Of: 6/22/2021 7:58:59 AM CDT)   Alcohol:        Past   (Last Updated: 10/11/2018 3:46:51 PM CDT by Gabriel Abarca PA-C)          Tobacco:        4 or less cigarettes(less than 1/4 pack)/day in last 30 days   Comments:  6/22/2021 7:54 AM - Jose Daniel Plunkett CMA: Pt states she is \"down to 2 cigarettes a day\"   (Last Updated: 6/22/2021 7:54:08 AM CDT by Jose Daniel Plunkett CMA)          Electronic Cigarette/Vaping:        Electronic Cigarette Use: Never.   (Last Updated: 3/23/2021 4:11:19 PM CDT by Jose Daniel Plunkett CMA)          Substance Abuse:        Never   (Last Updated: 10/11/2018 3:45:06 PM CDT by Gabriel Abarca PA-C)          Employment/School:        Employed, Work/School description: dental assistant, small business owner.   (Last Updated: 10/11/2018 3:46:38 PM CDT by Gabriel Abarca PA-C)          Home/Environment:        Marital status: .  Spouse/Partner name: oJse Miguel.  Lives with Self, Children.  1 children.   (Last Updated: 10/11/2018 3:45:57 PM CDT by Gabriel Abarca PA-C)          Nutrition/Health:        Diet restrictions: dairy.   (Last " Updated: 10/16/2018 2:34:54 PM CDT by Ann Llanes)          Exercise:        Exercise frequency: 3-4 times/week.   (Last Updated: 10/11/2018 3:45:19 PM CDT by Tevin GOEL, Gabriel GARCIA)

## 2022-02-16 NOTE — PROGRESS NOTES
Patient:   HEATHER SALMON            MRN: [id number]            FIN: [id number]               Age:   40 years     Sex:  Female     :  1980   Associated Diagnoses:   ADHD - Attention deficit disorder with hyperactivity; JULITO - Generalized anxiety disorder; Insomnia; Panic attacks   Author:   Gabriel Abarca PA-C      Chief Complaint   3/23/2021 4:10 PM CDT    Pt here for an ADHD and anxiety medcheck.        History of Present Illness   Chief complaint and symptoms noted above and confirmed with patient   she has hx of anxiety and ADHD  she is on alprazolam, Adderall, buspirone and zolpidem  PHQ-9 is 1, JULITO-7 is 11  she has been on Adderall for over 10 years  she takes alprazolam 1-2 times per day  she signs a new CSA today             Review of Systems   Constitutional:  Negative.    Ear/Nose/Mouth/Throat:  Negative.    Respiratory:  Negative.    Cardiovascular:  Negative.    Gastrointestinal:  Negative.       Health Status   Allergies:    Allergic Reactions (Selected)  Severity Not Documented  Concerta (No reactions were documented)  Glutens (No reactions were documented)  Milk Products (No reactions were documented)   Medications:  (Selected)   Prescriptions  Prescribed  ALPRAZolam 0.5 mg oral tablet: = 1 tab(s) ( 0.5 mg ), Oral, q6 hrs, PRN: for anxiety, # 60 tab(s), 0 Refill(s), Type: Soft Stop, Pharmacy: [facility], 1 tab(s) Oral q6 hrs,PRN:for anxiety, 66, in, 20 12:57:00 CDT, Height Measured, 172, lb, 20 8:23:00 CST, Weight...  Adderall 10 mg oral tablet: = 1 tab(s) ( 10 mg ), Oral, qpm, # 30 tab(s), 0 Refill(s), Type: Maintenance, Pharmacy: [facility], 1 tab(s) Oral qpm, 66, in, 20 12:57:00 CDT, Height Measured, 172, lb, 20 8:23:00 CST, Weight Measured  Adderall XR 30 mg oral capsule, extended release: = 1 cap(s) ( 30 mg ), Oral, qam, # 30 cap(s), 0 Refill(s), Type: Maintenance, Pharmacy: [facility], 1 cap(s) Oral qam, 66, in, 20 12:57:00 CDT,  Height Measured, 172, lb, 20 8:23:00 CST, Weight Measured  busPIRone 10 mg oral tablet: = 1 tab(s) ( 10 mg ), Oral, tid, # 90 tab(s), 1 Refill(s), Type: Maintenance, Pharmacy: [facility], 1 tab(s) Oral tid, 66, in, 20 12:57:00 CDT, Height Measured, 172, lb, 20 8:23:00 CST, Weight Measured  zolpidem 5 mg oral tablet: = 1 tab(s), Oral, qhs, PRN: AS NEEDED FOR SLEEP, # 30 tab(s), 0 Refill(s), Type: Soft Stop, Pharmacy: [facility], 1 tab(s) Oral qhs,PRN:AS NEEDED FOR SLEEP, 66, in, 20 12:57:00 CDT, Height Measured, 172, lb, 20 8:23:00 CST, Weight...   Problem list:    All Problems  ADHD / SNOMED CT 3556788977 / Confirmed  ADHD - Attention deficit disorder with hyperactivity / SNOMED CT 8916101897 / Confirmed  Insomnia / SNOMED CT 061792995 / Confirmed  Panic attacks / SNOMED CT 286815699 / Confirmed  Generalized anxiety disorder / SNOMED CT 70420013 / Confirmed  Low back pain / SNOMED CT 840729148 / Confirmed  JULITO - Generalized anxiety disorder / SNOMED CT 857400271 / Confirmed  Hip pain, bilateral / SNOMED CT 09171557 / Confirmed      Histories   Past Medical History:    Active  ADHD - Attention deficit disorder with hyperactivity (3369642068)  JULITO - Generalized anxiety disorder (095167410)  Low back pain (510151560)  Hip pain, bilateral (37787425)   Family History:    Attention deficit disorder  Brother  Anxiety  Father  Hypertension  Father  Heart disease  Grandfather (M)  Pancreatic cancer  Grandfather (M)  Migraine  Mother  Lyme disease  Sister  Anxiety  Father  Colon cancer  Grandmother (P) ()  Comments:  10/29/2018 12:11 PM Tiffany Hawthorne  70s  ADHD (attention deficit hyperactivity disorder)....  Brother  Insomnia  Mother     Procedure history:    Cyst of breast (1775066023) on 2014 at 34 Years.  Comments:  10/29/2018 12:08 PM Tiffany Hawthorne  Right  Biopsy ([id number]) on 2014 at 34 Years.  Comments:  10/29/2018 11:54 AM WILMER Connolly  Tiffany  Benign fibroadipose tissue.    10/29/2018 11:52 AM CDT - Tiffany Connolly  Needle biopsy of right breast.  Caesarean section ([id number]).  Tubal ligation (125326286).  Tooth extraction, multiple ([id number]).   Social History:        Electronic Cigarette/Vaping Assessment            Electronic Cigarette Use: Never.      Alcohol Assessment            Past      Tobacco Assessment            Former smoker, quit more than 30 days ago                     Comments:                      10/11/2018 - Tevin GOEL, Gabriel GARCIA                     quit 2010      Substance Abuse Assessment            Never      Employment and Education Assessment            Employed, Work/School description: dental assistant, small business owner.      Home and Environment Assessment            Marital status: .  Spouse/Partner name: Jose Miguel.  Lives with Self, Children.  1 children.      Nutrition and Health Assessment            Diet restrictions: dairy.      Exercise and Physical Activity Assessment            Exercise frequency: 3-4 times/week.        Physical Examination   Vital Signs   3/23/2021 4:10 PM CDT Temperature Tympanic 97 DegF  LOW    Peripheral Pulse Rate 88 bpm    Pulse Site Radial artery    Respiratory Rate 16 br/min    Systolic Blood Pressure 134 mmHg  HI    Diastolic Blood Pressure 80 mmHg    Mean Arterial Pressure 98 mmHg    BP Site Right arm      Measurements from flowsheet : Measurements   3/23/2021 4:10 PM CDT Height Measured - Standard 66 in    Weight Measured - Standard 137 lb    BSA 1.7 m2    Body Mass Index 22.11 kg/m2      General:  No acute distress.    Respiratory:  Lungs are clear to auscultation.    Cardiovascular:  Normal rate, Regular rhythm, No murmur.    Psychiatric:  Appropriate mood & affect.       Impression and Plan   Diagnosis     ADHD - Attention deficit disorder with hyperactivity (HAG29-VF F90.9).     JULITO - Generalized anxiety disorder (BHI56-KQ F41.1).     Insomnia (SMS38-NU G47.00).     Panic attacks  (GQR00-AW F41.0).     Plan:  new CSA is signed, will get a drug screen today  will fill her Adderall for 2 months, can call for 3rd and 4th months, should be seen every 4 months, will fill her alprazolam for 4 months  advised to try and reduce the use of alprazolam.    Orders     Orders   Lab (Gen Lab  Reference Lab):  Pain Management Profile 1 with Confirmation, Urine* ([facility]) (Order): Specimen Type: Urine, Collection Date: 3/23/2021 4:35 PM CDT.     Orders   Pharmacy:  Adderall XR 30 mg oral capsule, extended release (Prescribe): = 1 cap(s) ( 30 mg ), Oral, qam, Instructions: fill on or after 4/21/2021, # 30 cap(s), 0 Refill(s), Type: Maintenance, Pharmacy: [facility] #[id number], 1 cap(s) Oral qam,Instr:fill on or after 4/21/2021, 66, in, 3/23/2021 4:10 PM CDT, Height Measured, 137, lb, 3/23/2021 4:10 PM CDT, Weight Measured  Adderall XR 30 mg oral capsule, extended release (Prescribe): = 1 cap(s) ( 30 mg ), Oral, qam, Instructions: fill on or after 3/23/2021, # 30 cap(s), 0 Refill(s), Type: Maintenance, Pharmacy: [facility] #[id number], 1 cap(s) Oral qam,Instr:fill on or after 3/23/2021, 66, in, 3/23/2021 4:10 PM CDT, Height Measured, 137, lb, 3/23/2021 4:10 PM CDT, Weight Measured  Adderall 10 mg oral tablet (Prescribe): = 1 tab(s) ( 10 mg ), Oral, qpm, Instructions: fill on or after 4/21/2021, # 30 tab(s), 0 Refill(s), Type: Maintenance, Pharmacy: [facility] #[id number], 1 tab(s) Oral qpm,Instr:fill on or after 4/21/2021, 66, in, 3/23/2021 4:10 PM CDT, Height Measured, 137, lb, 3/23/2021 4:10 PM CDT, Weight Measured  Adderall 10 mg oral tablet (Prescribe): = 1 tab(s) ( 10 mg ), Oral, qpm, Instructions: fill on or after 3/23/2021, # 30 tab(s), 0 Refill(s), Type: Maintenance, Pharmacy: [facility] #[id number], 1 tab(s) Oral qpm,Instr:fill on or after 3/23/2021, 66, in, 3/23/2021 4:10 PM CDT, Height Measured, 137, lb, 3/23/2021 4:10 PM CDT, Weight Measured  busPIRone 10 mg oral tablet  (Prescribe): = 1 tab(s) ( 10 mg ), Oral, tid, # 90 tab(s), 1 Refill(s), Type: Maintenance, Pharmacy: [facility] #[id number], 1 tab(s) Oral tid, 66, in, 3/23/2021 4:10 PM CDT, Height Measured, 137, lb, 3/23/2021 4:10 PM CDT, Weight Measured  zolpidem 5 mg oral tablet (Prescribe): = 1 tab(s), Oral, qhs, PRN: AS NEEDED FOR SLEEP, # 30 tab(s), 3 Refill(s), Type: Soft Stop, Pharmacy: [facility] #[id number], 1 tab(s) Oral qhs,PRN:AS NEEDED FOR SLEEP, 66, in, 3/23/2021 4:10 PM CDT, Height Measured, 137, lb, 3/23/2021 4:10 PM CDT, Weight Measured  ALPRAZolam 0.5 mg oral tablet (Prescribe): = 1 tab(s) ( 0.5 mg ), Oral, q6 hrs, PRN: for anxiety, # 60 tab(s), 3 Refill(s), Type: Soft Stop, Pharmacy: [facility] #[id number], 1 tab(s) Oral q6 hrs,PRN:for anxiety, 66, in, 3/23/2021 4:10 PM CDT, Height Measured, 137, lb, 3/23/2021 4:10 PM CDT, Weight Measured  busPIRone 10 mg oral tablet (Modify): = 1 tab(s) ( 10 mg ), Oral, tid, # 90 tab(s), 1 Refill(s), Type: Hard Stop, Pharmacy: [facility], 66, in, 09/21/20 12:57:00 CDT, Height Measured, 172, lb, 01/30/20 8:23:00 CST, Weight Measured  ALPRAZolam 0.5 mg oral tablet (Modify): = 1 tab(s) ( 0.5 mg ), Oral, q6 hrs, PRN: for anxiety, # 60 tab(s), 0 Refill(s), Type: Hard Stop, Pharmacy: [facility], 66, in, 09/21/20 12:57:00 CDT, Height Measured, 172, lb, 01/30/20 8:23:00 CST, Weight Measured  Adderall XR 30 mg oral capsule, extended release (Modify): = 1 cap(s) ( 30 mg ), Oral, qam, # 30 cap(s), 0 Refill(s), Type: Hard Stop, Pharmacy: [facility], 66, in, 09/21/20 12:57:00 CDT, Height Measured, 172, lb, 01/30/20 8:23:00 CST, Weight Measured  Adderall 10 mg oral tablet (Modify): = 1 tab(s) ( 10 mg ), Oral, qpm, # 30 tab(s), 0 Refill(s), Type: Hard Stop, Pharmacy: [facility], 66, in, 09/21/20 12:57:00 CDT, Height Measured, 172, lb, 01/30/20 8:23:00 CST, Weight Measured  zolpidem 5 mg oral tablet (Modify): = 1 tab(s), Oral, qhs, PRN: AS NEEDED  FOR SLEEP, # 30 tab(s), 0 Refill(s), Type: Hard Stop, Pharmacy: [facility], 66, in, 09/21/20 12:57:00 CDT, Height Measured, 172, lb, 01/30/20 8:23:00 CST, Weight Measured.     Orders   Charges (Evaluation and Management):  [id number] office o/p est mod 30-39 min (Charge) (Order): Quantity: 1, JULITO - Generalized anxiety disorder  ADHD - Attention deficit disorder with hyperactivity  Insomnia  Panic attacks.

## 2022-02-16 NOTE — TELEPHONE ENCOUNTER
---------------------  From: Jossy Hauser MA (Phone Messages Pool ([id number]_[facility]))   To: JACKIE SALMON    Sent: 9/20/2021 8:49:56 AM CDT  Subject: FW: Meds, and missed appointment     Sunday Mejia,    We received your message.  I was reviewing your chart and it looks like Gabriel Abarca PA-C wants to see you again for a follow up med check for your depression/anxiety.  It looks like a refill of the alprazolam was sent in 9/10/2021 with a refill date of 9/17/2021 and that it was sent to [facility].  You can call the clinic to schedule that follow up appointment with Gabriel Abarca as well as your COVID shot.    Sincerely,       Jossy JETER CMA  ---------------------  From: JACKIE SALMON  To: [facility]  Sent: 09/18/2021 05:07 p.m. CDT  Subject: Meds, and missed appointment  Sorry to bother you again but I need some advise about my anxiety med. With my new job I take 1 in the morning & 1 around noon. Sometimes I need one in the late afternoon & 1 at night before bed. 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 I need to stay calm so these kids stay calm. Its hard & I m hoping to overcome this in time. Last week I ran out early & I didn t sleep for days. That made it very hard to work because I was in panic attack for about 4 days. I also realized I forgot to get my 2nd shot. Please tell me its not to late for me to get it?  Thanks for your time,  Jackie Salmon

## 2022-02-16 NOTE — NURSING NOTE
"Comprehensive Intake Entered On:  8/11/2021 5:51 PM CDT    Performed On:  8/11/2021 5:40 PM CDT by Jose Daniel Plunkett CMA               Summary   Chief Complaint :   Verbal consent given for a video visit.  Pt is needing refills on ADHD  and anxiety medcheck. Pt would also like to discuss migraines \"when things get humid\"   Height Measured :   66 in(Converted to: 5 ft 6 in, 167.64 cm)    Jose Daniel Plunkett CMA - 8/11/2021 5:40 PM CDT   Health Status   Allergies Verified? :   Yes   Medication History Verified? :   Yes   Medical History Verified? :   Yes   Pre-Visit Planning Status :   Completed   Tobacco Use? :   Current some day smoker   Tobacco Cessation Review :   Not ready to quit   Jose Daniel Plunkett CMA - 8/11/2021 5:40 PM CDT   Meds / Allergies   (As Of: 8/11/2021 5:51:23 PM CDT)   Allergies (Active)   Concerta  Estimated Onset Date:   Unspecified ; Created By:   Lali Bassett CMA; Reaction Status:   Active ; Category:   Drug ; Substance:   Concerta ; Type:   Allergy ; Updated By:   Lali Bassett CMA; Reviewed Date:   8/11/2021 5:51 PM CDT      Glutens  Estimated Onset Date:   Unspecified ; Created By:   Tiffany Connolly; Reaction Status:   Active ; Category:   Food ; Substance:   Glutens ; Type:   Allergy ; Updated By:   Tiffany Connolly; Reviewed Date:   8/11/2021 5:51 PM CDT      Milk Products  Estimated Onset Date:   Unspecified ; Created By:   Tiffany Connolly; Reaction Status:   Active ; Category:   Food ; Substance:   Milk Products ; Type:   Allergy ; Updated By:   Tiffany Connolly; Reviewed Date:   8/11/2021 5:51 PM CDT        Medication List   (As Of: 8/11/2021 5:51:23 PM CDT)   Prescription/Discharge Order    ALPRAZolam  :   ALPRAZolam ; Status:   Prescribed ; Ordered As Mnemonic:   ALPRAZolam 0.5 mg oral tablet ; Simple Display Line:   0.5 mg, 1 tab(s), Oral, q6 hrs, PRN: for anxiety, 24 tab(s), 0 Refill(s) ; Ordering Provider:   Gabriel Abarca PA-C; Catalog Code:   ALPRAZolam ; Order Dt/Tm:   8/5/2021 4:53:57 PM CDT        " "  amphetamine-dextroamphetamine  :   amphetamine-dextroamphetamine ; Status:   Prescribed ; Ordered As Mnemonic:   Adderall 20 mg oral tablet ; Simple Display Line:   20 mg, 1 tab(s), Oral, bid, fill on or after 7/26/2021, 60 tab(s), 0 Refill(s) ; Ordering Provider:   Gabriel Abarca PA-C; Catalog Code:   amphetamine-dextroamphetamine ; Order Dt/Tm:   7/26/2021 11:52:56 AM CDT          amphetamine-dextroamphetamine  :   amphetamine-dextroamphetamine ; Status:   Prescribed ; Ordered As Mnemonic:   Adderall 20 mg oral tablet ; Simple Display Line:   20 mg, 1 tab(s), Oral, bid, fill on or after 6/22/2021, 60 tab(s), 0 Refill(s) ; Ordering Provider:   Gabriel Abarca PA-C; Catalog Code:   amphetamine-dextroamphetamine ; Order Dt/Tm:   6/22/2021 8:28:31 AM CDT            Social History   Social History   (As Of: 8/11/2021 5:51:23 PM CDT)   Alcohol:        Past   (Last Updated: 10/11/2018 3:46:51 PM CDT by Gabriel Abarca PA-C)          Tobacco:        4 or less cigarettes(less than 1/4 pack)/day in last 30 days   Comments:  6/22/2021 7:54 AM - Jose Daniel Plunkett CMA: Pt states she is \"down to 2 cigarettes a day\"   (Last Updated: 6/22/2021 7:54:08 AM CDT by Jose Daniel Plunkett CMA)          Electronic Cigarette/Vaping:        Electronic Cigarette Use: Never.   (Last Updated: 3/23/2021 4:11:19 PM CDT by Jose Daniel Plunkett CMA)          Substance Abuse:        Never   (Last Updated: 10/11/2018 3:45:06 PM CDT by Gabriel Abarca PA-C)          Employment/School:        Employed, Work/School description: dental assistant, small business owner.   (Last Updated: 10/11/2018 3:46:38 PM CDT by Gabriel Abarca PA-C)          Home/Environment:        Marital status: .  Spouse/Partner name: Jose Miguel.  Lives with Self, Children.  1 children.   (Last Updated: 10/11/2018 3:45:57 PM CDT by Tevin GOEL, Gabriel GARCIA)          Nutrition/Health:        Diet restrictions: dairy.   (Last Updated: 10/16/2018 2:34:54 PM CDT by Ann Llanes)        "   Exercise:        Exercise frequency: 3-4 times/week.   (Last Updated: 10/11/2018 3:45:19 PM CDT by Tevin GOEL, Gabriel GARCIA)

## 2022-02-16 NOTE — TELEPHONE ENCOUNTER
---------------------  From: Ruba Patrick MD   To: Tevin GOEL, Gabriel GARCIA;     Sent: 2021 10:52:24 AM CDT  Subject: [facility] - adhd refill     paged by LEROY Camp for [place] triage at 1040 am returned page at 1042 am and spoke with Zoë.  Zoë notes that patient called the answering service after she was unable to  adderall at [facility].  She was instructed by Zoë that she will need to follow up with clinic on Monday and there is nothing that can be done over the weekend.   Confirmed in chart and with  that patient appears to have filled two prescriptions in  of 3 different strengths.  will not fill this weekend.   Zoë notes that she wanted this in the record in case patient calls back and uses a different story with a different triage nurse since their triage note won't be in the chart until next week.

## 2022-02-16 NOTE — TELEPHONE ENCOUNTER
---------------------  From: Lali Bassett CMA   To: Gabriel Teixeira MD;     Sent: 2019 9:19:01 AM CDT  Subject: refill request-adderall 10 and 30mg         PCP:   [facility]    Medication:    adderall 10mg    Medication:    adderall 30 mg     Last Filled:  19    Last Filled:   19            Date of last office visit and reason:   19      Note: pt called requested     Pharmacy: [facility]    Resource:   Jackie  Phone:  [phone]

## 2022-02-16 NOTE — LETTER
(Inserted Image. Unable to display)     January 25, 2021      HEATHER SALMON  [address] [place], [place] [id number]          Dear HEATHER,      Thank you for selecting [facility] (previously [facility]) for your healthcare needs.    Our records indicate you are due for the following services:     Follow-up office visit / Medication Check.    (FYI   Regarding office visits: In some instances, a video visit or telephone visit may be offered as an option.)      To schedule an appointment or if you have further questions, please contact your clinic at [phone].      Powered by [employer]    Sincerely,    Gabriel Teixeira MD

## 2022-02-16 NOTE — TELEPHONE ENCOUNTER
---------------------  From: Jody Abraham CMA (eRx Pool ([id number]_[facility]))   To: ARACELIS Message Pool ([id number]_[facility])   ;     Sent: 11/24/2020 11:01:21 AM CST  Subject: FW: Prescription renewal request     Medication Refill needing approval    PCP:   ARACELIS    Medication:   Adderall XR 30mg  Last Filled:  10/26/20    Quantity:  30  Refills:  0  CSA on file?   outdated     Medication:   Adderall 10mg  Last Filled:  10/26/20    Quantity:  30  Refills:  0    Medication:   Ambien 5mg  Last Filled:  10/14/20    Quantity:  30  Refills:  0    Date of last office visit and reason:   9/21/20 med check [facility]  Date of last labs pertaining to condition: none      Return to Clinic order placed?  Due in January           ---------------------  From: HEATHER SALMON  To: [facility]  Sent: 11/23/2020 06:59 p.m. CST  Subject: Prescription renewal request  HEATHER SALMON is requesting renewal of the prescription(s) below and has submitted the following details:  Prescription(s) to be renewed  Medication: Adderall XR 30 mg oral capsule, extended release  Dose: 1 cap(s)  Frequency: every morning  Rx date: 10/26/2020  Prescribed by: Gabriel Teixeira MD  Reason for renewal:   Quantity:   Medication: zolpidem 5 mg oral tablet  Dose: 1 tab(s)  Frequency: at bedtime as needed  Rx date: 10/14/2020  Prescribed by: Gabriel Teixeira MD  Reason for renewal:   Quantity:   Medication: Adderall 10 mg oral tablet  Dose: 1 tab(s)  Frequency: once a day (in the evening)  Rx date: 10/26/2020  Prescribed by: Gabriel Teixeira MD  Reason for renewal:   Quantity:   Delivery option  [place] [address]  Phone: [phone]  Contact Information  By Secure Message---------------------  From: Marilin Baeza CMA (ARACELIS Message Pool ([id number]_[facility])   )   To: Gabriel Teixeira MD;     Sent: 11/24/2020 11:18:50 AM CST  Subject: FW: Prescription renewal requestMedications  were refilled and sent to pharmacy.---------------------  From: Marilin Baeza CMA ([employer] e[employer] Pool ([id number]_[facility])   )   To: HEATHER SALMON    Sent: 11/24/2020 3:00:07 PM CST  Subject: FW: Prescription renewal request

## 2022-02-16 NOTE — NURSING NOTE
CAGE Assessment Entered On:  12/8/2021 1:48 PM CST    Performed On:  12/8/2021 1:48 PM CST by Jose Daniel Plunkett CMA               Assessment   Have you ever felt you should cut down on your drinking :   No   Have people annoyed you by criticizing your drinking :   No   Have you ever felt bad or guilty about your drinking :   No   Have you ever taken a drink first thing in the morning to steady your nerves or get rid of a hangover (Eye-opener) :   No   CAGE Score :   0    Jose Daniel Plunkett CMA - 12/8/2021 1:48 PM CST

## 2022-02-16 NOTE — TELEPHONE ENCOUNTER
---------------------  From: Lali Bassett CMA (eRx Pool ([id number]_[place]))   To: Gabriel Teixeira MD;     Sent: 5/2/2019 1:30:24 PM CDT  Subject: FW: Medication Management   Due Date/Time: 5/3/2019 10:35:00 AM CDT             ** Patient matched by Lali Bassett CMA on 5/2/2019 1:30:12 PM CDT **      ------------------------------------------  From: [facility]  To: Gabriel Teixeira MD  Sent: May 2, 2019 10:35:34 AM CDT  Subject: Medication Management  Due: May 3, 2019 10:35:34 AM CDT    ** On Hold Pending Signature **  Drug: amphetamine-dextroamphetamine (amphetamine-dextroamphetamine 30 mg oral capsule, extended release)  Take one (1) capsule by mouth each morning  Quantity: 30 EA       Days Supply: 0         Refills: 0  Substitutions Allowed  Notes from Pharmacy:     Dispensed Drug: amphetamine-dextroamphetamine (amphetamine-dextroamphetamine 30 mg oral capsule, extended release)  Take one (1) capsule by mouth each morning  Quantity: 30 EA       Days Supply: 0         Refills: 0  Substitutions Allowed  Notes from Pharmacy:   ---------------------------------------------------------------  From: Gabriel Teixeira MD   To: [facility]    Sent: 5/2/2019 2:17:30 PM CDT  Subject: FW: Medication Management     ** Submitted: **  Complete:amphetamine-dextroamphetamine (Adderall 10 mg oral tablet)   Signed by Gabriel Teixeira MD  5/2/2019 2:17:00 PM    ** Submitted: **  Complete:amphetamine-dextroamphetamine (Adderall XR 30 mg oral capsule, extended release)   Signed by Gabriel Teixeira MD  5/2/2019 2:17:00 PM    ** Approved **  amphetamine-dextroamphetamine (Amphetamine-Dextroamphet ER CapsuleER 24HR 30 MG)  Take one (1) capsule by mouth each morning  Qty:  30 EA        Days Supply:  0          Substitutions Allowed     Route To Pharmacy - [facility]
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
## 2022-02-16 NOTE — TELEPHONE ENCOUNTER
Entered by Zenaida Arredondo LPN on May 19, 2021 4:32:48 PM CDT  ---------------------  From: Zenaida Arredondo LPN   To: [facility]    Sent: 5/19/2021 4:32:48 PM CDT  Subject: Medication Management     ** Not Approved: Refill not appropriate, Rx sent 5/19 **  amphetamine-dextroamphetamine (AMPHET/DEXTR 30MG ER)  TAKE ONE (1) CAPSULE BY MOUTH EVERY MORNING  Qty:  30 cap(s)        Days Supply:  30        Refills:  0          Substitutions Allowed     Route To [facility]   Note from Pharmacy:  * * N O T I C E * * PRESCRIPTION PREVIOUSLY AUTHORIZED BY DOCTOR:GABRIEL OLIVERA [phone]* * *  Signed by Zenaida Arredondo LPN            ** Not Approved: Refill not appropriate, Rx sent 5/19 **  amphetamine-dextroamphetamine (AMPHET/DEXTR 10MG)  TAKE ONE (1) TABLET BY MOUTH EVERY EVENING  Qty:  30 tab(s)        Days Supply:  30        Refills:  0          Substitutions Allowed     Route To [facility]   Note from Pharmacy:  * * N O T I C E * * PRESCRIPTION PREVIOUSLY AUTHORIZED BY DOCTOR:GABRIEL OLIVERA [phone]* * *  Signed by Zenaida Arredondo LPN            ** Patient matched by Zenaida Arredondo LPN on 5/19/2021 4:32:04 PM CDT **      ------------------------------------------  From: [facility]  To: Gabriel Abarca PA-C  Sent: May 18, 2021 3:25:12 PM CDT  Subject: Medication Management  Due: May 14, 2021 8:13:56 PM CDT     ** On Hold Pending Signature **     Drug: amphetamine-dextroamphetamine (amphetamine-dextroamphetamine 30 mg oral capsule, extended release), TAKE ONE (1) CAPSULE BY MOUTH EVERY MORNING  Quantity: 30 cap(s)  Days Supply: 30  Refills: 0  Substitutions Allowed  Notes from Pharmacy:     Dispensed Drug: amphetamine-dextroamphetamine (amphetamine-dextroamphetamine 30 mg oral capsule, extended release), TAKE ONE (1) CAPSULE BY MOUTH EVERY MORNING  Quantity: 30 cap(s)  Days Supply: 30  Refills: 0  Substitutions Allowed  Notes from Pharmacy: * * N O T I C E * * PRESCRIPTION  PREVIOUSLY AUTHORIZED BY DOCTOR:HAILEY OLIVERA [phone]* * *     ** On Hold Pending Signature **     Drug: amphetamine-dextroamphetamine (amphetamine-dextroamphetamine 10 mg oral tablet), TAKE ONE (1) TABLET BY MOUTH EVERY EVENING  Quantity: 30 tab(s)  Days Supply: 30  Refills: 0  Substitutions Allowed  Notes from Pharmacy:     Dispensed Drug: amphetamine-dextroamphetamine (amphetamine-dextroamphetamine 10 mg oral tablet), TAKE ONE (1) TABLET BY MOUTH EVERY EVENING  Quantity: 30 tab(s)  Days Supply: 30  Refills: 0  Substitutions Allowed  Notes from Pharmacy: * * N O T I C E * * PRESCRIPTION PREVIOUSLY AUTHORIZED BY DOCTOR:HAILEY OLIVERA [phone]* * *  ------------------------------------------

## 2022-02-16 NOTE — TELEPHONE ENCOUNTER
---------------------  From: Lali Bassett CMA   To: Gabriel Teixeira MD;     Sent: 2019 10:12:57 AM CST  Subject: refill request-adderall     PCP:   ARACELIS    Medication:   Adderall 10 mg  Last Filled:  18    Quantity:  _  Refills:  0      Date of last office visit and reason:   18      Note:  Pt called requesting refill    Pharmacy: [facility]    Resource:   Jackie  Phone:   [phone]  ** Submitted: **  Order:amphetamine-dextroamphetamine (Adderall 10 mg oral tablet)  1 tab(s)  Oral  qpm  Qty:  30 tab(s)        Refills:  0          Substitutions Allowed     Route To Pharmacy - [facility]    Signed by Gabriel Teixeira MD  2019 2:46:00 PM    ** Documented **  Discontinue:amphetamine-dextroamphetamine (Adderall 10 mg oral tablet)   Signed by Gabriel Teixeira MD  2019 2:46:00 PM

## 2022-02-16 NOTE — NURSING NOTE
Generalized Anxiety Disorder Screening Entered On:  12/8/2021 1:49 PM CST    Performed On:  12/8/2021 1:48 PM CST by Jose Daniel Plunkett CMA               JULITO-7   JULITO Nervous, Anxious On Edge :   Several days   JULITO Control Worrying B :   Several days   JULITO Worrying Too Much :   Not at all   JULITO Trouble Relaxing :   More than half the days   JULITO Restless :   Not at all   JULITO Easily Annoyed/Irritable :   Not at all   JULITO Afraid :   Not at all   JULITO Total Screening Score :   4    JULITO Difficulty with Work, Home, Others :   Somewhat difficult   Jose Daniel Plunkett CMA - 12/8/2021 1:48 PM CST

## 2022-02-16 NOTE — TELEPHONE ENCOUNTER
---------------------  From: Lali Bassett CMA   To: Gabriel Teixeira MD;     Sent: 2019 11:52:58 AM CDT  Subject: refill request adderall 10 mg     PCP:   ARACELIS    Medication:   adderall 10 mg  Last Filled:  3/28/19    Quantity:  30  Refills:  0      Date of last office visit and reason:   4/15/19      Note:  pt called requesting refill    Pharmacy: [facility]    Resource:   Jackie  Phone:   [phone]---------------------  From: Gabriel Teixeira MD   To: Lali Bassett CMA;     Sent: 2019 12:27:15 PM CDT  Subject: RE: refill request adderall 10 mg     Not Due until ---------------------  From: Lali Bassett CMA   To: Gabriel Teixeira MD;     Sent: 2019 8:33:15 AM CDT  Subject: FW: refill request adderall 10 mg     called again this morning for refill of adderall 10mg  ** Submitted: **  Order:amphetamine-dextroamphetamine (Adderall 10 mg oral tablet)  1 tab(s)  Oral  qpm  Qty:  30 tab(s)        Refills:  0          Substitutions Allowed     Route To Pharmacy - [facility]    Signed by Gabriel Teixeira MD  2019 9:54:00 AMpt notified

## 2022-02-16 NOTE — LETTER
(Inserted Image. Unable to display)       March 26, 2019      HEATHER SALMON  [address] [place], [place] [id number]          Dear HEATHER,      Thank you for selecting [facility] for your healthcare needs.     Our records indicate you are due for the following services:     Medication Check    To schedule an appointment or if you have further questions, please contact your primary clinic:   [facility]       [phone]   [facility]       [phone]              [facility]     [phone]    Powered by [employer]    Sincerely,    Gabriel Teixeira MD

## 2022-03-02 NOTE — TELEPHONE ENCOUNTER
---------------------  From: Paola Fuentes (Phone Messages Pool ([id number]_[facility]))   To: DWG Message Pool (32224_[facility]);     Sent: 2/4/2022 3:12:10 PM CST  Subject: Adderall refill      Phone Message    PCP:   DER[facility]      Time of Call:  3:10pm       Person Calling:  pt  Phone number:  [phone]    Returned call at: Answered    Note:   Pt asking for refill of her Adderall. Last filled 1/5/2022 60 tabs 0 refills---------------------  From: Jose Daniel Plnukett CMA (DWG Message Pool ([id number]_[facility]))   To: Gabriel Abarca PA-C;     Sent: 2/4/2022 3:13:08 PM CST  Subject: FW: Adderall refill---------------------  From: Gabriel Abarca PA-C   To: DWG Message Pool ([id number]_[facility]);     Sent: 2/4/2022 3:39:00 PM CST  Subject: RE: Adderall refill      Adderall is filled for 2 monthsI called pt and left DWG message on her personal voicemail.  Jose Daniel Plunkett CMA

## 2022-03-07 ENCOUNTER — TELEPHONE (OUTPATIENT)
Dept: FAMILY MEDICINE | Facility: CLINIC | Age: 42
End: 2022-03-07
Payer: COMMERCIAL

## 2022-03-07 DIAGNOSIS — F41.9 ANXIETY: Primary | ICD-10-CM

## 2022-03-07 RX ORDER — ALPRAZOLAM 0.5 MG
TABLET ORAL
Qty: 30 TABLET | Refills: 0 | Status: CANCELLED | OUTPATIENT
Start: 2022-03-07

## 2022-03-07 NOTE — TELEPHONE ENCOUNTER
Reason for Call:  Medication or medication refill:    Do you use a [facility]?  Name of the pharmacy and phone number for the current request[facility]     Name of the medication requested: Anxiety meds Alprazolam 0.5mg     Other request: none      Can we leave a detailed message on this number? YES    Phone number patient can be reached at: Home number on file [phone] (home)    Best Time: any    Call taken on 3/7/2022 at 2:59 PM by Cindy Ambriz

## 2022-03-08 RX ORDER — DEXTROAMPHETAMINE SACCHARATE, AMPHETAMINE ASPARTATE, DEXTROAMPHETAMINE SULFATE AND AMPHETAMINE SULFATE 7.5; 7.5; 7.5; 7.5 MG/1; MG/1; MG/1; MG/1
30 TABLET ORAL
COMMUNITY
Start: 2021-12-07 | End: 2022-04-01

## 2022-03-08 RX ORDER — ALPRAZOLAM 0.5 MG
TABLET ORAL
COMMUNITY
Start: 2021-12-08 | End: 2022-03-08

## 2022-03-08 RX ORDER — ALPRAZOLAM 0.5 MG
TABLET ORAL
Qty: 120 TABLET | Refills: 0 | Status: SHIPPED | OUTPATIENT
Start: 2022-03-08 | End: 2022-04-01

## 2022-03-08 RX ORDER — SUMATRIPTAN 50 MG/1
50 TABLET, FILM COATED ORAL
COMMUNITY
Start: 2021-08-11 | End: 2022-05-20

## 2022-03-09 ENCOUNTER — TELEPHONE (OUTPATIENT)
Dept: FAMILY MEDICINE | Facility: CLINIC | Age: 42
End: 2022-03-09

## 2022-03-09 NOTE — TELEPHONE ENCOUNTER
Alprazolam 0.5mg tablets   take 1 tab po every morning and lunch,  2 tabs po bedtime as needed for anxiety.    From [facility] pharmacy ( [phone])  Plan does not cover 4 tabs daily.  Please call plan ([phone]) to initiate Prior Auth.    LEROY Vogt  [facility]

## 2022-04-01 ENCOUNTER — TELEPHONE (OUTPATIENT)
Dept: FAMILY MEDICINE | Facility: CLINIC | Age: 42
End: 2022-04-01

## 2022-04-01 ENCOUNTER — VIRTUAL VISIT (OUTPATIENT)
Dept: FAMILY MEDICINE | Facility: CLINIC | Age: 42
End: 2022-04-01

## 2022-04-01 DIAGNOSIS — F41.9 ANXIETY: ICD-10-CM

## 2022-04-01 DIAGNOSIS — F90.2 ATTENTION DEFICIT HYPERACTIVITY DISORDER (ADHD), COMBINED TYPE: Primary | ICD-10-CM

## 2022-04-01 DIAGNOSIS — F41.1 GENERALIZED ANXIETY DISORDER: ICD-10-CM

## 2022-04-01 DIAGNOSIS — F41.0 PANIC ATTACK: ICD-10-CM

## 2022-04-01 PROBLEM — G47.00 INSOMNIA: Status: ACTIVE | Noted: 2022-04-01

## 2022-04-01 PROBLEM — M25.559 ARTHRALGIA OF HIP: Status: ACTIVE | Noted: 2022-04-01

## 2022-04-01 PROBLEM — M54.50 LOW BACK PAIN: Status: ACTIVE | Noted: 2022-04-01

## 2022-04-01 PROBLEM — F90.9 ATTENTION DEFICIT HYPERACTIVITY DISORDER (ADHD): Status: ACTIVE | Noted: 2022-04-01

## 2022-04-01 PROBLEM — G43.909 MIGRAINE HEADACHE: Status: ACTIVE | Noted: 2022-04-01

## 2022-04-01 PROCEDURE — 99214 OFFICE O/P EST MOD 30 MIN: CPT | Mod: 95 | Performed by: PHYSICIAN ASSISTANT

## 2022-04-01 RX ORDER — ALPRAZOLAM 0.5 MG
TABLET ORAL
Qty: 90 TABLET | Refills: 1 | Status: SHIPPED | OUTPATIENT
Start: 2022-04-01 | End: 2022-05-20

## 2022-04-01 RX ORDER — DEXTROAMPHETAMINE SACCHARATE, AMPHETAMINE ASPARTATE, DEXTROAMPHETAMINE SULFATE AND AMPHETAMINE SULFATE 7.5; 7.5; 7.5; 7.5 MG/1; MG/1; MG/1; MG/1
30 TABLET ORAL DAILY
Qty: 30 TABLET | Refills: 0 | Status: SHIPPED | OUTPATIENT
Start: 2022-05-01 | End: 2022-05-13

## 2022-04-01 RX ORDER — DEXTROAMPHETAMINE SACCHARATE, AMPHETAMINE ASPARTATE, DEXTROAMPHETAMINE SULFATE AND AMPHETAMINE SULFATE 7.5; 7.5; 7.5; 7.5 MG/1; MG/1; MG/1; MG/1
30 TABLET ORAL DAILY
Qty: 30 TABLET | Refills: 0 | Status: SHIPPED | OUTPATIENT
Start: 2022-04-01 | End: 2022-05-01

## 2022-04-01 ASSESSMENT — ANXIETY QUESTIONNAIRES
3. WORRYING TOO MUCH ABOUT DIFFERENT THINGS: SEVERAL DAYS
2. NOT BEING ABLE TO STOP OR CONTROL WORRYING: NOT AT ALL
7. FEELING AFRAID AS IF SOMETHING AWFUL MIGHT HAPPEN: NOT AT ALL
IF YOU CHECKED OFF ANY PROBLEMS ON THIS QUESTIONNAIRE, HOW DIFFICULT HAVE THESE PROBLEMS MADE IT FOR YOU TO DO YOUR WORK, TAKE CARE OF THINGS AT HOME, OR GET ALONG WITH OTHER PEOPLE: SOMEWHAT DIFFICULT
1. FEELING NERVOUS, ANXIOUS, OR ON EDGE: MORE THAN HALF THE DAYS
GAD7 TOTAL SCORE: 5
6. BECOMING EASILY ANNOYED OR IRRITABLE: SEVERAL DAYS
5. BEING SO RESTLESS THAT IT IS HARD TO SIT STILL: NOT AT ALL

## 2022-04-01 ASSESSMENT — ENCOUNTER SYMPTOMS
RESPIRATORY NEGATIVE: 1
CARDIOVASCULAR NEGATIVE: 1
NERVOUS/ANXIOUS: 1
GASTROINTESTINAL NEGATIVE: 1
CONSTITUTIONAL NEGATIVE: 1

## 2022-04-01 ASSESSMENT — PATIENT HEALTH QUESTIONNAIRE - PHQ9: 5. POOR APPETITE OR OVEREATING: SEVERAL DAYS

## 2022-04-01 NOTE — PROGRESS NOTES
Jackie is a 41 year old who is being evaluated via a billable telephone visit.      What phone number would you like to be contacted at? [phone]  How would you like to obtain your AVS? Mail a copy    1. Attention deficit hyperactivity disorder (ADHD), combined type  Controlled, Will renew her Adderall for 2 months, can call for 3rd and 4th month, follow up in 4 months, [place] Prescription Drug Monitoring Program checked and reviewed.        - amphetamine-dextroamphetamine (ADDERALL) 30 MG tablet; Take 1 tablet (30 mg) by mouth daily  Dispense: 30 tablet; Refill: 0  - amphetamine-dextroamphetamine (ADDERALL) 30 MG tablet; Take 1 tablet (30 mg) by mouth daily  Dispense: 30 tablet; Refill: 0    2. Generalized anxiety disorder  Renewed her alprazolam    3. Panic attack  Renewed her alprazolam    4. Anxiety  Controlled, renewed her alprazolam for 1 month with a refill, can call for 3rd and 4th months, follow up in 4 months, [place] Prescription Drug Monitoring Program checked and reviewed.        - ALPRAZolam (XANAX) 0.5 MG tablet; 1 tab po qam, at lunch, 2 tab po at bedtime prn anxiety  Dispense: 90 tablet; Refill: 1      Subjective   Jackie is a 41 year old who presents for the following health issues.  Pt is calling for refills on her ADHD and anxiety medication. Pt is  accompanied by her self.    Anxiety       She is on Adderall and alprazolam, visit today for refills        Review of Systems   Constitutional: Negative.    HENT: Negative.    Respiratory: Negative.    Cardiovascular: Negative.    Gastrointestinal: Negative.    Psychiatric/Behavioral: The patient is nervous/anxious.             Objective           Vitals:  No vitals were obtained today due to virtual visit.    Physical Exam   healthy, alert and no distress  PSYCH: Alert and oriented times 3; coherent speech, normal   rate and volume, able to articulate logical thoughts, able   to abstract reason, no tangential thoughts, no hallucinations    or delusions  Her affect is normal  RESP: No cough, no audible wheezing, able to talk in full sentences  Remainder of exam unable to be completed due to telephone visits                Phone call duration: 6 minutes

## 2022-04-01 NOTE — TELEPHONE ENCOUNTER
Reason for Call:  Medication or medication refill:    Do you use a [facility]?  Name of the pharmacy and phone number for the current request:  [facility] in [place]     Name of the medication requested: Alprazolam 0.5mg and Adderall 30mg     Other request: none     Can we leave a detailed message on this number? YES    Phone number patient can be reached at: Home number on file [phone] (home)    Best Time: any    Call taken on 4/1/2022 at 9:15 AM by Cindy Ambriz

## 2022-04-01 NOTE — TELEPHONE ENCOUNTER
I called pt and gave her DWG message.  Pt is agreeable to visit but is only able to do telephone or video visit today because she is at work right now.  Pt transferred to scheduling.  Jose Daniel Plunkett CMA

## 2022-04-01 NOTE — TELEPHONE ENCOUNTER
She is due for a clinic visit for further refills, I believe it has been 4 months since her last visit

## 2022-04-02 ASSESSMENT — ANXIETY QUESTIONNAIRES: GAD7 TOTAL SCORE: 5

## 2022-04-05 ENCOUNTER — TELEPHONE (OUTPATIENT)
Dept: FAMILY MEDICINE | Facility: CLINIC | Age: 42
End: 2022-04-05
Payer: COMMERCIAL

## 2022-04-05 NOTE — TELEPHONE ENCOUNTER
"I called [facility] in [place] and confirmed that they did receive Rx for alprazolam on 4/1.  Pharmacist states it \"was a few days early for refill but we are filling Rx today\"  I called pt and informed her of this message.  Jose Daniel Plunkett CMA  "

## 2022-04-05 NOTE — TELEPHONE ENCOUNTER
Reason for Call:  Medication or medication refill:    Do you use a [facility]?  Name of the pharmacy and phone number for the [facility]     Name of the medication requested: Alprazolam .5mg    Other request: Patient calling in is really needing to pick this up today as he is not sleeping     Can we leave a detailed message on this number? YES    Phone number patient can be reached at: Home number on file [phone] (home)    Best Time: any    Call taken on 4/5/2022 at 7:10 AM by Cindy Ambriz

## 2022-04-05 NOTE — TELEPHONE ENCOUNTER
Yes, Rx for alprazolam was sent on 4/1, and per chart it was received by the pharmacy ([facility])

## 2022-04-25 ENCOUNTER — TELEPHONE (OUTPATIENT)
Dept: FAMILY MEDICINE | Facility: CLINIC | Age: 42
End: 2022-04-25
Payer: COMMERCIAL

## 2022-04-25 NOTE — TELEPHONE ENCOUNTER
Reason for Call:  Other prescription    Detailed comments: Patient states she has been working a lot of extra hours and now patient is almost out of her 30mg patient is wondering if she can get a refill of the 10mg just to help her get by until her next refill     Phone Number Patient can be reached at: Home number on file [phone] (home)    Best Time: any    Can we leave a detailed message on this number? YES    Call taken on 4/25/2022 at 12:11 PM by Cindy Ambriz

## 2022-05-12 ENCOUNTER — TELEPHONE (OUTPATIENT)
Dept: FAMILY MEDICINE | Facility: CLINIC | Age: 42
End: 2022-05-12
Payer: COMMERCIAL

## 2022-05-12 NOTE — TELEPHONE ENCOUNTER
Please clarify dosage of Adderall 30 mg.    Per Sig patient is to take Adderall 30 mg 1 tablet (30 mg) by mouth daily.    Patient calling stating she is to take Adderall 30 mg bid.

## 2022-05-12 NOTE — TELEPHONE ENCOUNTER
Reason for Call:  Other prescription    Detailed comments: Patient received 30 pills of Adderall, 30mg taken twice a day.  She should have 60 pills to last her the month. Please adjust quantity as she will run out soon.  Patients Pharmacy is [facility]    Phone Number Patient can be reached at: Home number on file [phone] (home)    Best Time: anytime    Can we leave a detailed message on this number? YES    Call taken on 5/12/2022 at 2:29 PM by FRANCIS WHITE

## 2022-05-13 DIAGNOSIS — F90.2 ATTENTION DEFICIT HYPERACTIVITY DISORDER (ADHD), COMBINED TYPE: ICD-10-CM

## 2022-05-13 RX ORDER — DEXTROAMPHETAMINE SACCHARATE, AMPHETAMINE ASPARTATE, DEXTROAMPHETAMINE SULFATE AND AMPHETAMINE SULFATE 7.5; 7.5; 7.5; 7.5 MG/1; MG/1; MG/1; MG/1
30 TABLET ORAL 2 TIMES DAILY
Qty: 30 TABLET | Refills: 0 | Status: SHIPPED | OUTPATIENT
Start: 2022-05-13 | End: 2022-05-20

## 2022-05-17 ENCOUNTER — TELEPHONE (OUTPATIENT)
Dept: FAMILY MEDICINE | Facility: CLINIC | Age: 42
End: 2022-05-17
Payer: COMMERCIAL
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
## 2022-05-17 NOTE — TELEPHONE ENCOUNTER
I called pt and left message on her personal voicemail that DWG would like her to schedule an in person clinic visit to discuss letter and sx further.  [phone]  Jose Daniel Plunkett CMA

## 2022-05-20 ENCOUNTER — OFFICE VISIT (OUTPATIENT)
Dept: FAMILY MEDICINE | Facility: CLINIC | Age: 42
End: 2022-05-20
Payer: COMMERCIAL

## 2022-05-20 VITALS
DIASTOLIC BLOOD PRESSURE: 88 MMHG | HEIGHT: 66 IN | TEMPERATURE: 96.8 F | HEART RATE: 64 BPM | WEIGHT: 122 LBS | RESPIRATION RATE: 16 BRPM | BODY MASS INDEX: 19.61 KG/M2 | SYSTOLIC BLOOD PRESSURE: 128 MMHG

## 2022-05-20 DIAGNOSIS — F41.9 ANXIETY: ICD-10-CM

## 2022-05-20 DIAGNOSIS — F90.2 ATTENTION DEFICIT HYPERACTIVITY DISORDER (ADHD), COMBINED TYPE: Primary | ICD-10-CM

## 2022-05-20 DIAGNOSIS — F41.1 GENERALIZED ANXIETY DISORDER: ICD-10-CM

## 2022-05-20 PROCEDURE — 99214 OFFICE O/P EST MOD 30 MIN: CPT | Performed by: PHYSICIAN ASSISTANT

## 2022-05-20 RX ORDER — ALPRAZOLAM 0.5 MG
TABLET ORAL
Qty: 120 TABLET | Refills: 2 | Status: SHIPPED | OUTPATIENT
Start: 2022-05-20 | End: 2022-08-25

## 2022-05-20 RX ORDER — ZOLPIDEM TARTRATE 5 MG/1
5 TABLET ORAL
COMMUNITY
End: 2022-05-20

## 2022-05-20 RX ORDER — DEXTROAMPHETAMINE SACCHARATE, AMPHETAMINE ASPARTATE, DEXTROAMPHETAMINE SULFATE AND AMPHETAMINE SULFATE 7.5; 7.5; 7.5; 7.5 MG/1; MG/1; MG/1; MG/1
30 TABLET ORAL 2 TIMES DAILY
Qty: 60 TABLET | Refills: 0 | Status: SHIPPED | OUTPATIENT
Start: 2022-06-17 | End: 2022-07-29

## 2022-05-20 RX ORDER — DEXTROAMPHETAMINE SACCHARATE, AMPHETAMINE ASPARTATE, DEXTROAMPHETAMINE SULFATE AND AMPHETAMINE SULFATE 7.5; 7.5; 7.5; 7.5 MG/1; MG/1; MG/1; MG/1
30 TABLET ORAL 2 TIMES DAILY
Qty: 60 TABLET | Refills: 0 | Status: SHIPPED | OUTPATIENT
Start: 2022-05-20 | End: 2022-07-29

## 2022-05-20 ASSESSMENT — ENCOUNTER SYMPTOMS
NERVOUS/ANXIOUS: 1
RESPIRATORY NEGATIVE: 1
GASTROINTESTINAL NEGATIVE: 1
CARDIOVASCULAR NEGATIVE: 1
DECREASED CONCENTRATION: 1
CONSTITUTIONAL NEGATIVE: 1

## 2022-05-20 ASSESSMENT — ANXIETY QUESTIONNAIRES
8. IF YOU CHECKED OFF ANY PROBLEMS, HOW DIFFICULT HAVE THESE MADE IT FOR YOU TO DO YOUR WORK, TAKE CARE OF THINGS AT HOME, OR GET ALONG WITH OTHER PEOPLE?: NOT DIFFICULT AT ALL
GAD7 TOTAL SCORE: 0
3. WORRYING TOO MUCH ABOUT DIFFERENT THINGS: NOT AT ALL
GAD7 TOTAL SCORE: 0
7. FEELING AFRAID AS IF SOMETHING AWFUL MIGHT HAPPEN: NOT AT ALL
1. FEELING NERVOUS, ANXIOUS, OR ON EDGE: NOT AT ALL
5. BEING SO RESTLESS THAT IT IS HARD TO SIT STILL: NOT AT ALL
2. NOT BEING ABLE TO STOP OR CONTROL WORRYING: NOT AT ALL
7. FEELING AFRAID AS IF SOMETHING AWFUL MIGHT HAPPEN: NOT AT ALL
4. TROUBLE RELAXING: NOT AT ALL
GAD7 TOTAL SCORE: 0
6. BECOMING EASILY ANNOYED OR IRRITABLE: NOT AT ALL
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
## 2022-05-20 NOTE — LETTER
May 20, 2022      Jackie Mcdonough  [address] [place] [id number]        To Whom It May Concern,        In June 2021, Jackie Mcdonough had a presumed allergic reaction to a brand of Adderall XR which caused a rash and increased agitation.  These symptoms resolved after stopping that medication.          Sincerely,        Gabriel Abarca PA-C

## 2022-05-20 NOTE — LETTER
[facility]  05/20/22  Patient: Jackie Mcdonough  YOB: 1980  Medical Record Number: [id number]                                                                                  Non-Opioid Controlled Substance Agreement    This is an agreement between you and your provider regarding safe and appropriate use of controlled substances prescribed by your care team. Controlled substances are?medicines that can cause physical and mental dependence (abuse).     There are strict laws about having and using these medicines. We here at [facility] are  committed to working with you in your efforts to get better. To support you in this work, we'll help you schedule regular office appointments for medicine refills. If we must cancel or change your appointment for any reason, we'll make sure you have enough medicine to last until your next appointment.     As a Provider, I will:     Listen carefully to your concerns while treating you with respect.     Recommend a treatment plan that I believe is in your best interest and may involve therapies other than medicine.      Talk with you often about the possible benefits and the risk of harm of any medicine that we prescribe for you.    Assess the safety of this medicine and check how well it works.      Provide a plan on how to taper (discontinue or go off) using this medicine if the decision is made to stop its use.      ::  As a Patient, I understand controlled substances:       Are prescribed by my care provider to help me function or work and manage my condition(s).?    Are strong medicines and can cause serious side effects.       Need to be taken exactly as prescribed.?Combining controlled substances with certain medicines or chemicals (such as illegal drugs, alcohol, sedatives, sleeping pills, and benzodiazepines) can be dangerous or even fatal.? If I stop taking my medicines suddenly, I may have severe withdrawal symptoms.      The risks, benefits, and side effects of these medicine(s) were explained to me. I agree that:    1. I will take part in other treatments as advised by my care team. This may be psychiatry or counseling, physical therapy, behavioral therapy, group treatment or a referral to specialist.    2. I will keep all my appointments and understand this is part of the monitoring of controlled substances.?My care team may require an office visit for EVERY controlled substance refill. If I miss appointments or don t follow instructions, my care team may stop my medicine    3. I will take my medicines as prescribed. I will not change the dose or schedule unless my care team tells me to. There will be no refills if I run out early.      4. I may be asked to come to the clinic and complete a urine drug test or complete a pill count. If I don t give a urine sample or participate in a pill count, the care team may stop my medicine.    5. I will only receive controlled substance prescriptions from this clinic. If I am treated by another provider, I will tell them that I am taking controlled substances and that I have a treatment agreement with this provider. I will inform my [facility] care team within one business day if I am given a prescription for any controlled substance by another healthcare provider. My [facility] care team can contact other providers and pharmacists about my use of any medicines.    6. It is up to me to make sure that I don't run out of my medicines on weekends or holidays.?If my care team is willing to refill my prescription without a visit, I must request refills only during office hours. Refills may take up to 3 business days to process. I will use one pharmacy to fill all my controlled substance prescriptions. I will notify the clinic about any changes to my insurance or medicine availability.    7. I am responsible for my prescriptions. If the medicine/prescription is lost, stolen or  destroyed, it will not be replaced.?I also agree not to share controlled substance medicines with anyone.     8. I am aware I should not use any illegal or recreational drugs. I agree not to drink alcohol unless my care team says I can.     9. If I enroll in the [place] Medical Cannabis program, I will tell my care team before my next refill.    10. I will tell my care team right away if I become pregnant, have a new medical problem treated outside of my regular clinic, or have a change in my medicines.     11. I understand that this medicine can affect my thinking, judgment and reaction time.? Alcohol and drugs affect the brain and body, which can affect the safety of my driving. Being under the influence of alcohol or drugs can affect my decision-making, behaviors, personal safety and the safety of others. Driving while impaired (DWI) can occur if a person is driving, operating or in physical control of a car, motorcycle, boat, snowmobile, ATV, motorbike, off-road vehicle or any other motor vehicle ([place] Statute 169A.20). I understand the risk if I choose to drive or operate any vehicle or machinery.    I understand that if I do not follow any of the conditions above, my prescriptions or treatment may be stopped or changed.   I agree that my provider, clinic care team and pharmacy may work with any city, state or federal law enforcement agency that investigates the misuse, sale or other diversion of my controlled medicine. I will allow my provider to discuss my care with, or share a copy of, this agreement with any other treating provider, pharmacy or emergency room where I receive care.     I have read this agreement and have asked questions about anything I did not understand.    ________________________________________________________  Patient Signature - Jackie Mcdonough     ___________________                   Date     ________________________________________________________  Provider Signature - Gabriel  W. Tevin, PA-C       ___________________                   Date     ________________________________________________________  Witness Signature (required if provider not present while patient signing)          ___________________                   Date

## 2022-05-29 ENCOUNTER — HEALTH MAINTENANCE LETTER (OUTPATIENT)
Age: 42
End: 2022-05-29

## 2022-06-15 ENCOUNTER — TELEPHONE (OUTPATIENT)
Dept: FAMILY MEDICINE | Facility: CLINIC | Age: 42
End: 2022-06-15
Payer: COMMERCIAL

## 2022-06-15 NOTE — TELEPHONE ENCOUNTER
Spoke to patient  Filled Rx on 5/31/22 and got 60 tabs.  She spoke to pharmacy yesterday and they told her there were no more refills even thought the bottle says there are.  Patient said pharmacy told her they have to talk to clinic.    Call to [facility]:  They have 60 tabs ready for patient to     Call to patient:  Informed her medication(s) is ready to .    She had no other questions.    Josefa Galindo RN

## 2022-06-15 NOTE — TELEPHONE ENCOUNTER
LEFT VOICE MESSAGE to call back and discuss refill    When did she get medication(s) filled?  Was partial amount given because of insurance policy? Pharmacy policy?  Is there partial amount left to fill when needed?  Does new Rx need to be sent?    Waiting for patient to call back.    Josefa Galindo RN    yes

## 2022-06-15 NOTE — TELEPHONE ENCOUNTER
Reason for Call:  Other prescription    Detailed comments: Patient went to pharmacy to  her Alprazolam, she received a partial prescription. She discussed increasing her dose.  She is currently taking 1 at am, 1 in afternoon, and 2 in the evening.  She does not have any left. She should be getting 120 tabs, but only received 60 which is 1/2 the dose.  Patient is completely out of medication    Phone Number Patient can be reached at: Home number on file [phone] (home)    Best Time: any    Can we leave a detailed message on this number? YES    Call taken on 6/15/2022 at 2:45 PM by FRANCIS WHITE

## 2022-07-29 DIAGNOSIS — F90.2 ATTENTION DEFICIT HYPERACTIVITY DISORDER (ADHD), COMBINED TYPE: ICD-10-CM

## 2022-07-29 DIAGNOSIS — F90.2 ATTENTION DEFICIT HYPERACTIVITY DISORDER (ADHD), COMBINED TYPE: Primary | ICD-10-CM

## 2022-07-29 RX ORDER — DEXTROAMPHETAMINE SACCHARATE, AMPHETAMINE ASPARTATE, DEXTROAMPHETAMINE SULFATE AND AMPHETAMINE SULFATE 7.5; 7.5; 7.5; 7.5 MG/1; MG/1; MG/1; MG/1
30 TABLET ORAL 2 TIMES DAILY
Qty: 60 TABLET | Refills: 0 | OUTPATIENT
Start: 2022-07-29

## 2022-07-29 RX ORDER — DEXTROAMPHETAMINE SACCHARATE, AMPHETAMINE ASPARTATE, DEXTROAMPHETAMINE SULFATE AND AMPHETAMINE SULFATE 7.5; 7.5; 7.5; 7.5 MG/1; MG/1; MG/1; MG/1
30 TABLET ORAL 2 TIMES DAILY
Qty: 60 TABLET | Refills: 0 | Status: SHIPPED | OUTPATIENT
Start: 2022-08-26 | End: 2022-09-28

## 2022-07-29 RX ORDER — DEXTROAMPHETAMINE SACCHARATE, AMPHETAMINE ASPARTATE, DEXTROAMPHETAMINE SULFATE AND AMPHETAMINE SULFATE 7.5; 7.5; 7.5; 7.5 MG/1; MG/1; MG/1; MG/1
30 TABLET ORAL 2 TIMES DAILY
Qty: 60 TABLET | Refills: 0 | Status: SHIPPED | OUTPATIENT
Start: 2022-07-29 | End: 2022-08-25

## 2022-07-29 NOTE — TELEPHONE ENCOUNTER
Routing refill request to provider for review/approval because:  Drug not on the G refill protocol: Adderall.  Please advise on refill request.   Last OV 5/20/22.    Last Written Prescription Date: 5/20/222   Last Fill Quantity: 60,  # refills: 0   Last office visit: 5/20/2022 with prescribing provider

## 2022-08-25 DIAGNOSIS — F41.9 ANXIETY: ICD-10-CM

## 2022-08-25 DIAGNOSIS — F90.2 ATTENTION DEFICIT HYPERACTIVITY DISORDER (ADHD), COMBINED TYPE: ICD-10-CM

## 2022-08-25 RX ORDER — DEXTROAMPHETAMINE SACCHARATE, AMPHETAMINE ASPARTATE, DEXTROAMPHETAMINE SULFATE AND AMPHETAMINE SULFATE 7.5; 7.5; 7.5; 7.5 MG/1; MG/1; MG/1; MG/1
30 TABLET ORAL 2 TIMES DAILY
Qty: 60 TABLET | Refills: 0 | Status: SHIPPED | OUTPATIENT
Start: 2022-08-25 | End: 2022-11-01

## 2022-08-25 RX ORDER — ALPRAZOLAM 0.5 MG
TABLET ORAL
Qty: 120 TABLET | Refills: 2 | Status: SHIPPED | OUTPATIENT
Start: 2022-08-25 | End: 2022-11-01

## 2022-08-25 NOTE — TELEPHONE ENCOUNTER
Routing refill request to provider for review/approval because:  Drug not on the [facility] refill protocol

## 2022-08-25 NOTE — TELEPHONE ENCOUNTER
Medication Question or Refill        What medication are you calling about (include dose and sig)?: Adderall 30 mg take 1 tab my mouth 2 times daily; alprazolam 0.5 mg 1 tab po qam, at lunch, 2 tab po at bedtime prn anxiety    Controlled Substance Agreement on file:   CSA -- Patient Level:    Controlled Substance Agreement - Non - Opioid - Scan on 5/24/2022  3:37 PM: NON-OPIOID CONTROLLED SUBSTANCE AGREEMENT       Who prescribed the medication?: Gabriel HUITRON    Do you need a refill? Yes: pt needs refill on adderall and will need refill on alprazolam (has enough for now)    When did you use the medication last? Last night    Patient offered an appointment? Yes, pt had appt recently and is not due for visit    Do you have any questions or concerns?  No    Preferred Pharmacy:   [id number] - [place], [place] - [facility] [address] [facility] [address] [address] [place] [address]  Phone: [phone] Fax: [phone]      Could we send this information to you in [email or web address] or would you prefer to receive a phone call?:   Patient would prefer a phone call   Okay to leave a detailed message?: Yes at Cell number on file:    Telephone Information:   Mobile [phone]

## 2022-09-28 DIAGNOSIS — F90.2 ATTENTION DEFICIT HYPERACTIVITY DISORDER (ADHD), COMBINED TYPE: ICD-10-CM

## 2022-09-28 RX ORDER — DEXTROAMPHETAMINE SACCHARATE, AMPHETAMINE ASPARTATE, DEXTROAMPHETAMINE SULFATE AND AMPHETAMINE SULFATE 7.5; 7.5; 7.5; 7.5 MG/1; MG/1; MG/1; MG/1
30 TABLET ORAL 2 TIMES DAILY
Qty: 60 TABLET | Refills: 0 | Status: SHIPPED | OUTPATIENT
Start: 2022-09-28 | End: 2022-12-27

## 2022-09-28 NOTE — TELEPHONE ENCOUNTER
Last Written Prescription Date:  8/26/22  Last Fill Quantity: 30,  # refills: 0   Last office visit: 5/20/2022 with prescribing provider:  ADHD/Med check   Future Office Visit:  Due now?    Routing refill request to provider for review/approval because:  Drug not on the [facility] refill protocol       Please advise on refill request and when patient is due for visit.    Josefa Galindo RN

## 2022-09-28 NOTE — TELEPHONE ENCOUNTER
Medication Question or Refill    Contacts       Type Contact Phone/Fax    09/28/2022 12:33 PM CDT Phone (Incoming) Jackie Mdconough (Self) [phone] (H)          What medication are you calling about (include dose and sig)?: Adderall    Controlled Substance Agreement on file:   CSA -- Patient Level:    Controlled Substance Agreement - Non - Opioid - Scan on 5/24/2022  3:37 PM: NON-OPIOID CONTROLLED SUBSTANCE AGREEMENT       Who prescribed the medication?: Gabriel Abarca    Do you need a refill? Yes: only has 1 left for tomorrow 9/29/2022 am    When did you use the medication last? 9/28/2022    Patient offered an appointment? No    Do you have any questions or concerns?  Yes: concerned about getting filled    Preferred Pharmacy:   [id number] - [place] - [address] [facility] [address] [address] [place] [address]  Phone: [phone] Fax: [phone]      Could we send this information to you in [facility] or would you prefer to receive a phone call?:   Patient would prefer a phone call   Okay to leave a detailed message?: Yes at Home number on file [phone] (home)

## 2022-10-03 ENCOUNTER — HEALTH MAINTENANCE LETTER (OUTPATIENT)
Age: 42
End: 2022-10-03

## 2022-10-27 NOTE — TELEPHONE ENCOUNTER
Routing refill request to provider for review/approval because:  Drug not on the G refill protocol       Requested Prescriptions   Pending Prescriptions Disp Refills     amphetamine-dextroamphetamine (ADDERALL) 30 MG tablet 60 tablet 0     Sig: Take 1 tablet (30 mg) by mouth 2 times daily       There is no refill protocol information for this order

## 2022-10-28 RX ORDER — DEXTROAMPHETAMINE SACCHARATE, AMPHETAMINE ASPARTATE, DEXTROAMPHETAMINE SULFATE AND AMPHETAMINE SULFATE 7.5; 7.5; 7.5; 7.5 MG/1; MG/1; MG/1; MG/1
30 TABLET ORAL 2 TIMES DAILY
Qty: 60 TABLET | Refills: 0 | OUTPATIENT
Start: 2022-10-28

## 2022-10-30 ENCOUNTER — NURSE TRIAGE (OUTPATIENT)
Dept: NURSING | Facility: CLINIC | Age: 42
End: 2022-10-30

## 2022-10-30 NOTE — TELEPHONE ENCOUNTER
Last week tried to get a refill of Adderall. That Ann said it's back ordered. So she needs it prescribed in different MGs. 30 mg in AM and Afternoon is what she takes. It was suggested a 20 mg and 10 mg to total 30 mg. Ann in [place] is her phramacy. Please call her at:  [phone].  May leave a detailed message.  She is out of the medication and it makes her heart do funny things so is hoping to get this as soon as possible.  Estella Christiansen RN  [facility]    Reason for Disposition    Caller requesting a CONTROLLED substance prescription refill (e.g., narcotics, ADHD medicines)    Additional Information    Negative: New-onset or worsening symptoms, see that guideline (e.g., diarrhea, runny nose, sore throat)    Negative: Medicine question not related to refill or renewal    Negative: Caller (e.g., patient or pharmacist) requesting information about a new medicine    Negative: Caller requesting information unrelated to medicine    Negative: [1] Prescription refill request for ESSENTIAL medicine (i.e., likelihood of harm to patient if not taken) AND [2] triager unable to refill per department policy    Negative: [1] Prescription not at pharmacy AND [2] was prescribed by PCP recently  (Exception: triager has access to EMR and prescription is recorded there. Go to Home Care and confirm for pharmacy.)    Negative: [1] Pharmacy calling with prescription questions AND [2] triager unable to answer question    Negative: Prescription request for new medicine (not a refill)    Protocols used: MEDICATION REFILL AND RENEWAL CALL-A-

## 2022-11-01 ENCOUNTER — VIRTUAL VISIT (OUTPATIENT)
Dept: FAMILY MEDICINE | Facility: CLINIC | Age: 42
End: 2022-11-01

## 2022-11-01 DIAGNOSIS — F90.2 ATTENTION DEFICIT HYPERACTIVITY DISORDER (ADHD), COMBINED TYPE: Primary | ICD-10-CM

## 2022-11-01 DIAGNOSIS — F41.9 ANXIETY: ICD-10-CM

## 2022-11-01 DIAGNOSIS — F41.1 GENERALIZED ANXIETY DISORDER: ICD-10-CM

## 2022-11-01 PROCEDURE — 99213 OFFICE O/P EST LOW 20 MIN: CPT | Mod: 95 | Performed by: PHYSICIAN ASSISTANT

## 2022-11-01 RX ORDER — DEXTROAMPHETAMINE SACCHARATE, AMPHETAMINE ASPARTATE, DEXTROAMPHETAMINE SULFATE AND AMPHETAMINE SULFATE 3.75; 3.75; 3.75; 3.75 MG/1; MG/1; MG/1; MG/1
30 TABLET ORAL 2 TIMES DAILY
Qty: 120 TABLET | Refills: 0 | Status: SHIPPED | OUTPATIENT
Start: 2022-11-01 | End: 2022-12-01

## 2022-11-01 RX ORDER — ALPRAZOLAM 0.5 MG
TABLET ORAL
Qty: 120 TABLET | Refills: 3 | Status: SHIPPED | OUTPATIENT
Start: 2022-11-01 | End: 2023-03-14

## 2022-11-01 RX ORDER — DEXTROAMPHETAMINE SACCHARATE, AMPHETAMINE ASPARTATE, DEXTROAMPHETAMINE SULFATE AND AMPHETAMINE SULFATE 3.75; 3.75; 3.75; 3.75 MG/1; MG/1; MG/1; MG/1
30 TABLET ORAL 2 TIMES DAILY
Qty: 120 TABLET | Refills: 0 | Status: SHIPPED | OUTPATIENT
Start: 2022-11-30 | End: 2022-12-02

## 2022-11-01 ASSESSMENT — PATIENT HEALTH QUESTIONNAIRE - PHQ9: 5. POOR APPETITE OR OVEREATING: SEVERAL DAYS

## 2022-11-01 ASSESSMENT — ANXIETY QUESTIONNAIRES
1. FEELING NERVOUS, ANXIOUS, OR ON EDGE: SEVERAL DAYS
GAD7 TOTAL SCORE: 10
3. WORRYING TOO MUCH ABOUT DIFFERENT THINGS: SEVERAL DAYS
IF YOU CHECKED OFF ANY PROBLEMS ON THIS QUESTIONNAIRE, HOW DIFFICULT HAVE THESE PROBLEMS MADE IT FOR YOU TO DO YOUR WORK, TAKE CARE OF THINGS AT HOME, OR GET ALONG WITH OTHER PEOPLE: SOMEWHAT DIFFICULT
GAD7 TOTAL SCORE: 10
6. BECOMING EASILY ANNOYED OR IRRITABLE: MORE THAN HALF THE DAYS
5. BEING SO RESTLESS THAT IT IS HARD TO SIT STILL: SEVERAL DAYS
7. FEELING AFRAID AS IF SOMETHING AWFUL MIGHT HAPPEN: MORE THAN HALF THE DAYS
2. NOT BEING ABLE TO STOP OR CONTROL WORRYING: MORE THAN HALF THE DAYS

## 2022-11-01 ASSESSMENT — ENCOUNTER SYMPTOMS: NERVOUS/ANXIOUS: 1

## 2022-11-01 NOTE — PROGRESS NOTES
Jackie is a 42 year old who is being evaluated via a billable telephone visit.      What phone number would you like to be contacted at? [phone]  How would you like to obtain your AVS? Mail a copy    1. Attention deficit hyperactivity disorder (ADHD), combined type  Pharmacy is out of stock of 30 mg tabs, will switch to 15 mg tabs (2 tabs bid)  Filled for 2 months, can call for 3rd and 4th month  Due for next visit in March 2023    - amphetamine-dextroamphetamine (ADDERALL) 15 MG tablet; Take 2 tablets (30 mg) by mouth 2 times daily for 30 days  Dispense: 120 tablet; Refill: 0  - amphetamine-dextroamphetamine (ADDERALL) 15 MG tablet; Take 2 tablets (30 mg) by mouth 2 times daily for 30 days  Dispense: 120 tablet; Refill: 0    2. Generalized anxiety disorder      3. Anxiety  Controlled, refilled her alprazolam for 4 months  - ALPRAZolam (XANAX) 0.5 MG tablet; 1 tab po qam, at lunch, 2 tab po at bedtime prn anxiety  Dispense: 120 tablet; Refill: 3      Subjective   Jackie is a 42 year old accompanied by her self, presenting for the following health issues:  A.D.H.D and Anxiety (Pt calling and an ADHD and anxiety medcheck.)      A.D.H.D    Anxiety         Jackie is a 42 year old who presents for the following health issues:  Telephone visit for an ADHD and anxiety medcheck.     taking Adderall bid, and taking alprazolam 1 qam, 1 at lunch, and 1-2 at bedtime        Review of Systems   Psychiatric/Behavioral: The patient is nervous/anxious.             Objective           Vitals:  No vitals were obtained today due to virtual visit.    Physical Exam   healthy, alert and no distress  PSYCH: Alert and oriented times 3; coherent speech, normal   rate and volume, able to articulate logical thoughts, able   to abstract reason, no tangential thoughts, no hallucinations   or delusions  Her affect is normal  RESP: No cough, no audible wheezing, able to talk in full sentences  Remainder of exam unable to be completed  due to telephone visits                Phone call duration: 5 minutes      Provider location: in clinic  Patient location: at work  Platform: Hyper9

## 2022-12-01 DIAGNOSIS — F90.2 ATTENTION DEFICIT HYPERACTIVITY DISORDER (ADHD), COMBINED TYPE: ICD-10-CM

## 2022-12-01 NOTE — TELEPHONE ENCOUNTER
Patient calling back after speaking with pharmacy.  Pt states Pharmacy is requesting Rx be ordered back to original Rx of Adderall 30 mg Tablet: Take 1 (30mg) tab by mouth 2 times daily.  Since Rx was filled; sending Urgent to get Rx resent to pharmacy.

## 2022-12-02 DIAGNOSIS — F90.2 ATTENTION DEFICIT HYPERACTIVITY DISORDER (ADHD), COMBINED TYPE: ICD-10-CM

## 2022-12-02 RX ORDER — DEXTROAMPHETAMINE SACCHARATE, AMPHETAMINE ASPARTATE, DEXTROAMPHETAMINE SULFATE AND AMPHETAMINE SULFATE 7.5; 7.5; 7.5; 7.5 MG/1; MG/1; MG/1; MG/1
30 TABLET ORAL 2 TIMES DAILY
Qty: 60 TABLET | Refills: 0 | Status: SHIPPED | OUTPATIENT
Start: 2022-12-02 | End: 2022-12-27

## 2022-12-02 RX ORDER — DEXTROAMPHETAMINE SACCHARATE, AMPHETAMINE ASPARTATE, DEXTROAMPHETAMINE SULFATE AND AMPHETAMINE SULFATE 7.5; 7.5; 7.5; 7.5 MG/1; MG/1; MG/1; MG/1
30 TABLET ORAL 2 TIMES DAILY
Qty: 60 TABLET | Refills: 0 | OUTPATIENT
Start: 2022-12-02

## 2022-12-27 DIAGNOSIS — F90.2 ATTENTION DEFICIT HYPERACTIVITY DISORDER (ADHD), COMBINED TYPE: ICD-10-CM

## 2022-12-27 RX ORDER — DEXTROAMPHETAMINE SACCHARATE, AMPHETAMINE ASPARTATE, DEXTROAMPHETAMINE SULFATE AND AMPHETAMINE SULFATE 7.5; 7.5; 7.5; 7.5 MG/1; MG/1; MG/1; MG/1
30 TABLET ORAL 2 TIMES DAILY
Qty: 60 TABLET | Refills: 0 | Status: SHIPPED | OUTPATIENT
Start: 2022-12-27 | End: 2023-03-14

## 2022-12-27 RX ORDER — DEXTROAMPHETAMINE SACCHARATE, AMPHETAMINE ASPARTATE, DEXTROAMPHETAMINE SULFATE AND AMPHETAMINE SULFATE 7.5; 7.5; 7.5; 7.5 MG/1; MG/1; MG/1; MG/1
30 TABLET ORAL 2 TIMES DAILY
Qty: 60 TABLET | Refills: 0 | Status: SHIPPED | OUTPATIENT
Start: 2023-01-24 | End: 2023-01-24

## 2023-01-23 ENCOUNTER — TELEPHONE (OUTPATIENT)
Dept: FAMILY MEDICINE | Facility: CLINIC | Age: 43
End: 2023-01-23

## 2023-01-23 NOTE — TELEPHONE ENCOUNTER
Pt calling to report that when she contacted [facility] to make sure they would be able to fill Rx.  They informed her they are out of stock.  Pt then called [facility] in [place].  They informed her that they have 1/2 month supply, but is first come-first serve.    Pt would like to have 15 day supply sent to [facility].  Please address.

## 2023-01-24 DIAGNOSIS — F90.2 ATTENTION DEFICIT HYPERACTIVITY DISORDER (ADHD), COMBINED TYPE: ICD-10-CM

## 2023-01-24 RX ORDER — DEXTROAMPHETAMINE SACCHARATE, AMPHETAMINE ASPARTATE, DEXTROAMPHETAMINE SULFATE AND AMPHETAMINE SULFATE 7.5; 7.5; 7.5; 7.5 MG/1; MG/1; MG/1; MG/1
30 TABLET ORAL 2 TIMES DAILY
Qty: 60 TABLET | Refills: 0 | Status: SHIPPED | OUTPATIENT
Start: 2023-01-24 | End: 2023-02-22

## 2023-01-24 NOTE — TELEPHONE ENCOUNTER
I called pt and left message on personal voicemail that [facility] did send temporary supply to requested pharmacy.  [phone]  Jose Daniel Plunkett CMA

## 2023-02-22 DIAGNOSIS — F90.2 ATTENTION DEFICIT HYPERACTIVITY DISORDER (ADHD), COMBINED TYPE: ICD-10-CM

## 2023-02-22 RX ORDER — DEXTROAMPHETAMINE SACCHARATE, AMPHETAMINE ASPARTATE, DEXTROAMPHETAMINE SULFATE AND AMPHETAMINE SULFATE 7.5; 7.5; 7.5; 7.5 MG/1; MG/1; MG/1; MG/1
30 TABLET ORAL 2 TIMES DAILY
Qty: 60 TABLET | Refills: 0 | Status: SHIPPED | OUTPATIENT
Start: 2023-02-22 | End: 2023-03-14

## 2023-03-10 ENCOUNTER — TELEPHONE (OUTPATIENT)
Dept: FAMILY MEDICINE | Facility: CLINIC | Age: 43
End: 2023-03-10

## 2023-03-13 ENCOUNTER — MYC MEDICAL ADVICE (OUTPATIENT)
Dept: FAMILY MEDICINE | Facility: CLINIC | Age: 43
End: 2023-03-13

## 2023-03-13 DIAGNOSIS — F41.9 ANXIETY: ICD-10-CM

## 2023-03-13 RX ORDER — ALPRAZOLAM 0.5 MG
TABLET ORAL
Qty: 120 TABLET | OUTPATIENT
Start: 2023-03-13

## 2023-03-13 NOTE — TELEPHONE ENCOUNTER
Message sent to patient via My Chart.  
  Medication Question or Refill        What medication are you calling about (include dose and sig)?: ALPRAZolam 0.5 mg & Adderall 30 mg    Preferred Pharmacy:   [id number] - [place], [place] - [address] [facility] [address] [place] [address]  Phone: [phone] Fax: [phone]      Controlled Substance Agreement on file:   CSA -- Patient Level:     [Media Unavailable] Controlled Substance Agreement - Non - Opioid - Scan on 5/24/2022  3:37 PM: NON-OPIOID CONTROLLED SUBSTANCE AGREEMENT       Who prescribed the medication?: Dr. Abarca    Do you need a refill? Yes    When did you use the medication last? Today    Patient offered an appointment? No    Do you have any questions or concerns?  No      Could we send this information to you in [facility] or would you prefer to receive a phone call?:   Patient would prefer a phone call   Okay to leave a detailed message?: Yes at Other phone number:  [phone] (mobile)    
No

## 2023-03-14 ENCOUNTER — OFFICE VISIT (OUTPATIENT)
Dept: FAMILY MEDICINE | Facility: CLINIC | Age: 43
End: 2023-03-14

## 2023-03-14 VITALS
TEMPERATURE: 97.4 F | DIASTOLIC BLOOD PRESSURE: 90 MMHG | HEIGHT: 66 IN | BODY MASS INDEX: 18 KG/M2 | WEIGHT: 112 LBS | RESPIRATION RATE: 16 BRPM | HEART RATE: 88 BPM | SYSTOLIC BLOOD PRESSURE: 166 MMHG

## 2023-03-14 DIAGNOSIS — F41.9 ANXIETY: ICD-10-CM

## 2023-03-14 DIAGNOSIS — F41.1 GENERALIZED ANXIETY DISORDER: Primary | ICD-10-CM

## 2023-03-14 DIAGNOSIS — Z87.891 PERSONAL HISTORY OF NICOTINE DEPENDENCE: ICD-10-CM

## 2023-03-14 DIAGNOSIS — F90.2 ATTENTION DEFICIT HYPERACTIVITY DISORDER (ADHD), COMBINED TYPE: ICD-10-CM

## 2023-03-14 LAB
AMPHETAMINES UR QL: DETECTED
BARBITURATES UR QL SCN: NOT DETECTED
BENZODIAZ UR QL SCN: DETECTED
BUPRENORPHINE UR QL: NOT DETECTED
CANNABINOIDS UR QL: DETECTED
COCAINE UR QL SCN: NOT DETECTED
D-METHAMPHET UR QL: NOT DETECTED
METHADONE UR QL SCN: NOT DETECTED
OPIATES UR QL SCN: NOT DETECTED
OXYCODONE UR QL SCN: NOT DETECTED
PCP UR QL SCN: NOT DETECTED
PROPOXYPH UR QL: NOT DETECTED
TRICYCLICS UR QL SCN: NOT DETECTED

## 2023-03-14 PROCEDURE — 99213 OFFICE O/P EST LOW 20 MIN: CPT | Performed by: PHYSICIAN ASSISTANT

## 2023-03-14 PROCEDURE — 80306 DRUG TEST PRSMV INSTRMNT: CPT | Performed by: PHYSICIAN ASSISTANT

## 2023-03-14 RX ORDER — DEXTROAMPHETAMINE SACCHARATE, AMPHETAMINE ASPARTATE, DEXTROAMPHETAMINE SULFATE AND AMPHETAMINE SULFATE 7.5; 7.5; 7.5; 7.5 MG/1; MG/1; MG/1; MG/1
30 TABLET ORAL 2 TIMES DAILY
Qty: 60 TABLET | Refills: 0 | Status: SHIPPED | OUTPATIENT
Start: 2023-03-14 | End: 2023-05-23

## 2023-03-14 RX ORDER — DEXTROAMPHETAMINE SACCHARATE, AMPHETAMINE ASPARTATE, DEXTROAMPHETAMINE SULFATE AND AMPHETAMINE SULFATE 7.5; 7.5; 7.5; 7.5 MG/1; MG/1; MG/1; MG/1
30 TABLET ORAL 2 TIMES DAILY
Qty: 60 TABLET | Refills: 0 | Status: SHIPPED | OUTPATIENT
Start: 2023-04-10 | End: 2023-05-23

## 2023-03-14 RX ORDER — ALPRAZOLAM 0.5 MG
TABLET ORAL
Qty: 120 TABLET | Refills: 3 | Status: SHIPPED | OUTPATIENT
Start: 2023-03-14 | End: 2023-07-24

## 2023-03-14 ASSESSMENT — ENCOUNTER SYMPTOMS
CONSTITUTIONAL NEGATIVE: 1
RESPIRATORY NEGATIVE: 1
NERVOUS/ANXIOUS: 1
GASTROINTESTINAL NEGATIVE: 1
CARDIOVASCULAR NEGATIVE: 1

## 2023-03-14 ASSESSMENT — ANXIETY QUESTIONNAIRES
3. WORRYING TOO MUCH ABOUT DIFFERENT THINGS: NOT AT ALL
6. BECOMING EASILY ANNOYED OR IRRITABLE: NOT AT ALL
IF YOU CHECKED OFF ANY PROBLEMS ON THIS QUESTIONNAIRE, HOW DIFFICULT HAVE THESE PROBLEMS MADE IT FOR YOU TO DO YOUR WORK, TAKE CARE OF THINGS AT HOME, OR GET ALONG WITH OTHER PEOPLE: SOMEWHAT DIFFICULT
7. FEELING AFRAID AS IF SOMETHING AWFUL MIGHT HAPPEN: NOT AT ALL
8. IF YOU CHECKED OFF ANY PROBLEMS, HOW DIFFICULT HAVE THESE MADE IT FOR YOU TO DO YOUR WORK, TAKE CARE OF THINGS AT HOME, OR GET ALONG WITH OTHER PEOPLE?: SOMEWHAT DIFFICULT
5. BEING SO RESTLESS THAT IT IS HARD TO SIT STILL: NOT AT ALL
4. TROUBLE RELAXING: SEVERAL DAYS
GAD7 TOTAL SCORE: 3
7. FEELING AFRAID AS IF SOMETHING AWFUL MIGHT HAPPEN: NOT AT ALL
GAD7 TOTAL SCORE: 3
2. NOT BEING ABLE TO STOP OR CONTROL WORRYING: SEVERAL DAYS
GAD7 TOTAL SCORE: 3
1. FEELING NERVOUS, ANXIOUS, OR ON EDGE: SEVERAL DAYS

## 2023-03-14 ASSESSMENT — PATIENT HEALTH QUESTIONNAIRE - PHQ9
SUM OF ALL RESPONSES TO PHQ QUESTIONS 1-9: 2
10. IF YOU CHECKED OFF ANY PROBLEMS, HOW DIFFICULT HAVE THESE PROBLEMS MADE IT FOR YOU TO DO YOUR WORK, TAKE CARE OF THINGS AT HOME, OR GET ALONG WITH OTHER PEOPLE: SOMEWHAT DIFFICULT
SUM OF ALL RESPONSES TO PHQ QUESTIONS 1-9: 2

## 2023-03-14 NOTE — PROGRESS NOTES
1. Generalized anxiety disorder    - Urine Drugs of Abuse Screen; Future    2. Attention deficit hyperactivity disorder (ADHD), combined type  Controlled,  Will renew for 2 months, can call for 3rd and 4th months  - Urine Drugs of Abuse Screen; Future  - amphetamine-dextroamphetamine (ADDERALL) 30 MG tablet; Take 1 tablet (30 mg) by mouth 2 times daily  Dispense: 60 tablet; Refill: 0  - amphetamine-dextroamphetamine (ADDERALL) 30 MG tablet; Take 1 tablet (30 mg) by mouth 2 times daily  Dispense: 60 tablet; Refill: 0    3. Anxiety  Controlled on alprazolam, renewed  - ALPRAZolam (XANAX) 0.5 MG tablet; 1 tab po qam, at lunch, 2 tab po at bedtime prn anxiety  Dispense: 120 tablet; Refill: 3    4. Personal history of nicotine dependence  Continue to try and stop    She does not have insurance at this time but hopes to get it soon with her new job.  Discussed needed preventive services, hopefully we can do those once her insurance starts.     Colt Mejia is a 42 year old accompanied by her self, presenting for the following health issues:  A.D.H.D and Anxiety (Pt here for an ADHD and anxiety med check.)      A.D.H.D    Anxiety    History of Present Illness       Mental Health Follow-up:  Patient presents to follow-up on Anxiety.    Patient's anxiety since last visit has been:  Better  The patient is not having other symptoms associated with anxiety.  Any significant life events: No  Patient is feeling anxious or having panic attacks.  Patient has no concerns about alcohol or drug use.    She eats 0-1 servings of fruits and vegetables daily.She consumes 2 sweetened beverage(s) daily.She exercises with enough effort to increase her heart rate 30 to 60 minutes per day.  She exercises with enough effort to increase her heart rate 6 days per week. She is missing 1 dose(s) of medications per week.    Today's PHQ-9         PHQ-9 Total Score: 2    PHQ-9 Q9 Thoughts of better off dead/self-harm past 2 weeks :   Not at  "all    How difficult have these problems made it for you to do your work, take care of things at home, or get along with other people: Somewhat difficult  Today's JULITO-7 Score: 3     She has hx of anxiety and ADHD, here today for med refills   taking Adderall bid, and taking alprazolam 1 qam, 1 at lunch, and 1-2 at bedtime     She has been trying to quit smoking, has cut down  Uses 5-6 cigs/day          Review of Systems   Constitutional: Negative.    HENT: Negative.    Respiratory: Negative.    Cardiovascular: Negative.    Gastrointestinal: Negative.    Psychiatric/Behavioral: The patient is nervous/anxious.             Objective    BP (!) 166/90 (BP Location: Right arm, Patient Position: Sitting, Cuff Size: Adult Regular)   Pulse 88   Temp 97.4  F (36.3  C) (Tympanic)   Resp 16   Ht 1.676 m (5' 6\")   Wt 50.8 kg (112 lb)   BMI 18.08 kg/m    Body mass index is 18.08 kg/m .  Physical Exam  Vitals reviewed.   Constitutional:       Appearance: Normal appearance.   HENT:      Head:      Comments: no maxillary sinus tenderness     Right Ear: Tympanic membrane normal.      Left Ear: Tympanic membrane normal.      Mouth/Throat:      Mouth: Mucous membranes are moist.      Pharynx: Oropharynx is clear. No posterior oropharyngeal erythema.   Eyes:      Extraocular Movements: Extraocular movements intact.      Pupils: Pupils are equal, round, and reactive to light.   Cardiovascular:      Rate and Rhythm: Normal rate and regular rhythm.      Pulses: Normal pulses.      Heart sounds: Normal heart sounds.   Pulmonary:      Effort: Pulmonary effort is normal.      Breath sounds: Normal breath sounds.   Musculoskeletal:      Cervical back: Normal range of motion. No tenderness.   Lymphadenopathy:      Cervical: No cervical adenopathy.   Neurological:      Mental Status: She is alert.   Psychiatric:         Mood and Affect: Mood normal.                            "

## 2023-03-14 NOTE — TELEPHONE ENCOUNTER
Patient calling to check medication status.  Writer relayed message below.  Appointment scheduled for today, 3/14/23, with Gabriel Abarca for medication review.

## 2023-03-14 NOTE — LETTER
[facility]  03/14/23  Patient: Jackie Mcdonough  YOB: 1980  Medical Record Number: [id number]                                                                                  Non-Opioid Controlled Substance Agreement    This is an agreement between you and your provider regarding safe and appropriate use of controlled substances prescribed by your care team. Controlled substances are?medicines that can cause physical and mental dependence (abuse).     There are strict laws about having and using these medicines. We here at [facility] are  committed to working with you in your efforts to get better. To support you in this work, we'll help you schedule regular office appointments for medicine refills. If we must cancel or change your appointment for any reason, we'll make sure you have enough medicine to last until your next appointment.     As a Provider, I will:     Listen carefully to your concerns while treating you with respect.     Recommend a treatment plan that I believe is in your best interest and may involve therapies other than medicine.      Talk with you often about the possible benefits and the risk of harm of any medicine that we prescribe for you.    Assess the safety of this medicine and check how well it works.      Provide a plan on how to taper (discontinue or go off) using this medicine if the decision is made to stop its use.      ::  As a Patient, I understand controlled substances:       Are prescribed by my care provider to help me function or work and manage my condition(s).?    Are strong medicines and can cause serious side effects.       Need to be taken exactly as prescribed.?Combining controlled substances with certain medicines or chemicals (such as illegal drugs, alcohol, sedatives, sleeping pills, and benzodiazepines) can be dangerous or even fatal.? If I stop taking my medicines suddenly, I may have severe withdrawal symptoms.      The risks, benefits, and side effects of these medicine(s) were explained to me. I agree that:    1. I will take part in other treatments as advised by my care team. This may be psychiatry or counseling, physical therapy, behavioral therapy, group treatment or a referral to specialist.    2. I will keep all my appointments and understand this is part of the monitoring of controlled substances.?My care team may require an office visit for EVERY controlled substance refill. If I miss appointments or don t follow instructions, my care team may stop my medicine    3. I will take my medicines as prescribed. I will not change the dose or schedule unless my care team tells me to. There will be no refills if I run out early.      4. I may be asked to come to the clinic and complete a urine drug test or complete a pill count. If I don t give a urine sample or participate in a pill count, the care team may stop my medicine.    5. I will only receive controlled substance prescriptions from this clinic. If I am treated by another provider, I will tell them that I am taking controlled substances and that I have a treatment agreement with this provider. I will inform my [facility] care team within one business day if I am given a prescription for any controlled substance by another healthcare provider. My [facility] care team can contact other providers and pharmacists about my use of any medicines.    6. It is up to me to make sure that I don't run out of my medicines on weekends or holidays.?If my care team is willing to refill my prescription without a visit, I must request refills only during office hours. Refills may take up to 3 business days to process. I will use one pharmacy to fill all my controlled substance prescriptions. I will notify the clinic about any changes to my insurance or medicine availability.    7. I am responsible for my prescriptions. If the medicine/prescription is lost, stolen or  destroyed, it will not be replaced.?I also agree not to share controlled substance medicines with anyone.     8. I am aware I should not use any illegal or recreational drugs. I agree not to drink alcohol unless my care team says I can.     9. If I enroll in the [place] Medical Cannabis program, I will tell my care team before my next refill.    10. I will tell my care team right away if I become pregnant, have a new medical problem treated outside of my regular clinic, or have a change in my medicines.     11. I understand that this medicine can affect my thinking, judgment and reaction time.? Alcohol and drugs affect the brain and body, which can affect the safety of my driving. Being under the influence of alcohol or drugs can affect my decision-making, behaviors, personal safety and the safety of others. Driving while impaired (DWI) can occur if a person is driving, operating or in physical control of a car, motorcycle, boat, snowmobile, ATV, motorbike, off-road vehicle or any other motor vehicle ([place] Statute 169A.20). I understand the risk if I choose to drive or operate any vehicle or machinery.    I understand that if I do not follow any of the conditions above, my prescriptions or treatment may be stopped or changed.   I agree that my provider, clinic care team and pharmacy may work with any city, state or federal law enforcement agency that investigates the misuse, sale or other diversion of my controlled medicine. I will allow my provider to discuss my care with, or share a copy of, this agreement with any other treating provider, pharmacy or emergency room where I receive care.     I have read this agreement and have asked questions about anything I did not understand.    ________________________________________________________  Patient Signature - Jackie Mcdonough     ___________________                   Date     ________________________________________________________  Provider Signature - Gabriel  W. Tevin, PA-C       ___________________                   Date     ________________________________________________________  Witness Signature (required if provider not present while patient signing)          ___________________                   Date

## 2023-05-20 ENCOUNTER — HEALTH MAINTENANCE LETTER (OUTPATIENT)
Age: 43
End: 2023-05-20

## 2023-05-23 DIAGNOSIS — F90.2 ATTENTION DEFICIT HYPERACTIVITY DISORDER (ADHD), COMBINED TYPE: ICD-10-CM

## 2023-05-23 RX ORDER — DEXTROAMPHETAMINE SACCHARATE, AMPHETAMINE ASPARTATE, DEXTROAMPHETAMINE SULFATE AND AMPHETAMINE SULFATE 7.5; 7.5; 7.5; 7.5 MG/1; MG/1; MG/1; MG/1
30 TABLET ORAL 2 TIMES DAILY
Qty: 60 TABLET | Refills: 0 | Status: CANCELLED | OUTPATIENT
Start: 2023-05-23

## 2023-05-23 RX ORDER — DEXTROAMPHETAMINE SACCHARATE, AMPHETAMINE ASPARTATE, DEXTROAMPHETAMINE SULFATE AND AMPHETAMINE SULFATE 7.5; 7.5; 7.5; 7.5 MG/1; MG/1; MG/1; MG/1
30 TABLET ORAL 2 TIMES DAILY
Qty: 60 TABLET | Refills: 0 | Status: SHIPPED | OUTPATIENT
Start: 2023-05-23 | End: 2023-08-09

## 2023-05-23 RX ORDER — DEXTROAMPHETAMINE SACCHARATE, AMPHETAMINE ASPARTATE, DEXTROAMPHETAMINE SULFATE AND AMPHETAMINE SULFATE 7.5; 7.5; 7.5; 7.5 MG/1; MG/1; MG/1; MG/1
30 TABLET ORAL 2 TIMES DAILY
Qty: 60 TABLET | Refills: 0 | Status: SHIPPED | OUTPATIENT
Start: 2023-06-20 | End: 2023-07-24

## 2023-05-23 NOTE — TELEPHONE ENCOUNTER
Medication Question or Refill    Contacts       Type Contact Phone/Fax    05/23/2023 09:06 AM CDT Phone (Incoming) Jackie Mcdonough (Self) [phone] (M)          What medication are you calling about (include dose and sig)?:      Preferred Pharmacy:  [id number]  [facility] [address] [facility] [address]  Phone: [phone] Fax: [phone]    [id number] - [place], [place] - [address] [place] [address]  Phone: [phone] Fax: [phone]      Controlled Substance Agreement on file:   CSA -- Patient Level:     [Media Unavailable] Controlled Substance Agreement - Non - Opioid - Scan on 5/24/2022  3:37 PM: NON-OPIOID CONTROLLED SUBSTANCE AGREEMENT       Who prescribed the medication?: DWG    Do you need a refill? Yes    Could we send this information to you in [facility] or would you prefer to receive a phone call?:   Patient would prefer a phone call     Okay to leave a detailed message?: Yes at Home number on file [phone] (home)

## 2023-05-23 NOTE — TELEPHONE ENCOUNTER
Last office visit: 3/14/2023     Future Appointments 5/23/2023 - 11/19/2023    None          Requested Prescriptions   Pending Prescriptions Disp Refills     amphetamine-dextroamphetamine (ADDERALL) 30 MG tablet 60 tablet 0     Sig: Take 1 tablet (30 mg) by mouth 2 times daily       There is no refill protocol information for this order

## 2023-05-23 NOTE — TELEPHONE ENCOUNTER
I called pt and left a voicemail message that prescriptions were refilled for 2 months per DERIK.  Jose Daniel Plunkett CMA

## 2023-05-25 ENCOUNTER — TELEPHONE (OUTPATIENT)
Dept: FAMILY MEDICINE | Facility: CLINIC | Age: 43
End: 2023-05-25

## 2023-05-25 NOTE — TELEPHONE ENCOUNTER
Patient Quality Outreach    Patient is due for the following:   Physical Preventive Adult Physical    Next Steps:   Schedule a Adult Preventative and Establish Care    Type of outreach:    Phone, left message for patient/parent to call back.      Questions for provider review:    None           Radha Gabriel, CMA

## 2023-06-04 ENCOUNTER — HEALTH MAINTENANCE LETTER (OUTPATIENT)
Age: 43
End: 2023-06-04

## 2023-07-22 DIAGNOSIS — F90.2 ATTENTION DEFICIT HYPERACTIVITY DISORDER (ADHD), COMBINED TYPE: ICD-10-CM

## 2023-07-22 DIAGNOSIS — F41.9 ANXIETY: ICD-10-CM

## 2023-07-24 RX ORDER — ALPRAZOLAM 0.5 MG
TABLET ORAL
Qty: 120 TABLET | Refills: 0 | Status: SHIPPED | OUTPATIENT
Start: 2023-07-24 | End: 2023-08-16

## 2023-07-24 RX ORDER — DEXTROAMPHETAMINE SACCHARATE, AMPHETAMINE ASPARTATE, DEXTROAMPHETAMINE SULFATE AND AMPHETAMINE SULFATE 7.5; 7.5; 7.5; 7.5 MG/1; MG/1; MG/1; MG/1
30 TABLET ORAL 2 TIMES DAILY
Qty: 28 TABLET | Refills: 0 | Status: SHIPPED | OUTPATIENT
Start: 2023-07-24 | End: 2023-08-16

## 2023-07-24 NOTE — TELEPHONE ENCOUNTER
Medication Question or Refill    Contacts         Type Contact Phone/Fax    07/22/2023 11:47 AM CDT Interface (Incoming) [id number] - [place] - [address] [address]  & [address] [phone]            What medication are you calling about (include dose and sig)?:     Preferred Pharmacy:   [id number] - [place] - [address] AT [address] [address]  & [address] [address] [address] [place] [place] [address]  Phone: [phone] Fax: [phone]        Controlled Substance Agreement on file:   CSA -- Patient Level:     [Media Unavailable] Controlled Substance Agreement - Non - Opioid - Scan on 5/24/2022  3:37 PM: NON-OPIOID CONTROLLED SUBSTANCE AGREEMENT       Who prescribed the medication?: Gabriel Abarca    Do you need a refill? Yes    Patient offered an appointment? Yes: Est care appt made for 8/1/2023 with Brodie Bar    Do you have any questions or concerns?  Yes: pt states she needed this filled last Friday      Could we send this information to you in [employer] or would you prefer to receive a phone call?:   Patient would prefer a phone call   Okay to leave a detailed message?: Yes at Cell number on file:    Telephone Information:   Mobile [phone]

## 2023-07-24 NOTE — TELEPHONE ENCOUNTER
Please contact patient. She needs appointment to establish care with new provider. I sent in one refill of the alprazolam to cover until she can be seen.

## 2023-08-08 DIAGNOSIS — F90.2 ATTENTION DEFICIT HYPERACTIVITY DISORDER (ADHD), COMBINED TYPE: ICD-10-CM

## 2023-08-08 RX ORDER — DEXTROAMPHETAMINE SACCHARATE, AMPHETAMINE ASPARTATE, DEXTROAMPHETAMINE SULFATE AND AMPHETAMINE SULFATE 7.5; 7.5; 7.5; 7.5 MG/1; MG/1; MG/1; MG/1
30 TABLET ORAL 2 TIMES DAILY
Qty: 28 TABLET | Refills: 0 | Status: CANCELLED | OUTPATIENT
Start: 2023-08-08

## 2023-08-08 NOTE — TELEPHONE ENCOUNTER
Medication Question or Refill    What medication are you calling about (include dose and sig)?: Adderall 30 MG tablet    Preferred Pharmacy:  [facility][id number] - [place], [place] - [address] [facility] [address] [place] [place] [address]  Phone: [phone] Fax: [phone]      Controlled Substance Agreement on file:   CSA -- Patient Level:     [Media Unavailable] Controlled Substance Agreement - Non - Opioid - Scan on 5/24/2022  3:37 PM: NON-OPIOID CONTROLLED SUBSTANCE AGREEMENT       Who prescribed the medication?: Dr. Abarca    Do you need a refill? Yes    Patient offered an appointment? Yes has an upcoming Appt    Do you have any questions or concerns? No      Could we [facility] this [facility] you [facility] or would you prefer to receive a phone call?:   Patient would prefer a phone call   Okay to leave a detailed message?: Yes at Cell number on file:    Telephone Information:   Mobile [phone]

## 2023-08-08 NOTE — TELEPHONE ENCOUNTER
Last office visit: 3/14/2023   Last RX: 7/24/23    My Chart message sent to patient.    Future Appointments 8/8/2023 - 2/4/2024        Date Visit Type Length Department Provider     8/16/2023  9:40 AM OFFICE VISIT 20 min [facility] FP/IM/Brodie Castro PA    Location Instructions:     [facility] is located at [facility], one block north of [facility] and the [facility]. The clinic shares a building with the [facility] [employer]; use the clinic s parking lot and entrance on the building s south side.                      Requested Prescriptions   Pending Prescriptions Disp Refills    amphetamine-dextroamphetamine (ADDERALL) 30 MG tablet 28 tablet 0     Sig: Take 1 tablet (30 mg) by mouth 2 times daily       There is no refill protocol information for this order

## 2023-08-09 DIAGNOSIS — F90.2 ATTENTION DEFICIT HYPERACTIVITY DISORDER (ADHD), COMBINED TYPE: ICD-10-CM

## 2023-08-09 RX ORDER — DEXTROAMPHETAMINE SACCHARATE, AMPHETAMINE ASPARTATE, DEXTROAMPHETAMINE SULFATE AND AMPHETAMINE SULFATE 7.5; 7.5; 7.5; 7.5 MG/1; MG/1; MG/1; MG/1
30 TABLET ORAL 2 TIMES DAILY
Qty: 60 TABLET | Refills: 0 | Status: SHIPPED | OUTPATIENT
Start: 2023-08-09 | End: 2023-10-05

## 2023-08-09 NOTE — TELEPHONE ENCOUNTER
Patient has one pill left for tomorrow (08/10/2023); needs refill sent to pharmacy.      Medication Question or Refill    Contacts         Type Contact Phone/Fax    08/09/2023 05:32 PM CDT Phone (Incoming) Jackie Mcdonough (Self) [phone] (M)            What medication are you calling about (include dose and sig)?:       Preferred Pharmacy:  [id number] - [place], [place] - [address] [facility] [address] [facility] [address] [address] [email or web address] [address]  [facility] [place] [address]  Phone: [phone] Fax: [phone]      Controlled Substance Agreement on file:   CSA -- Patient Level:     [Media Unavailable] Controlled Substance Agreement - Non - Opioid - Scan on 5/24/2022  3:37 PM: NON-OPIOID CONTROLLED SUBSTANCE AGREEMENT       Who prescribed the medication?: DWG    Do you need a refill? Yes    Could we send this information to you in [facility] or would you prefer to receive a phone call?:   Patient would prefer a phone call     Okay to leave a detailed message?: Yes at Cell number on file:    Telephone Information:   Mobile [phone]

## 2023-08-09 NOTE — TELEPHONE ENCOUNTER
Routing refill request to provider for review/approval because:  Drug not on the [facility] refill protocol   Visit scheduled with Brodie Bar 8/16/23; needs to establish care with a provider.       Last Written Prescription Date:  7/24/23  Last Fill Quantity: 28,  # refills: 0   Last office visit: 3/14/2023 ; last virtual visit: 11/1/2022 with prescribing provider   Future Office Visit:   Next 5 appointments (look out 90 days)      Aug 16, 2023  9:40 AM  (Arrive by 9:20 AM)  Provider Visit with TOYM Espinoza  [facility] ([facility] ) [address] [place] [address] [phone]

## 2023-08-16 ENCOUNTER — OFFICE VISIT (OUTPATIENT)
Dept: FAMILY MEDICINE | Facility: CLINIC | Age: 43
End: 2023-08-16

## 2023-08-16 VITALS
SYSTOLIC BLOOD PRESSURE: 118 MMHG | OXYGEN SATURATION: 99 % | DIASTOLIC BLOOD PRESSURE: 82 MMHG | HEIGHT: 66 IN | HEART RATE: 88 BPM | BODY MASS INDEX: 17.68 KG/M2 | WEIGHT: 110 LBS | RESPIRATION RATE: 16 BRPM | TEMPERATURE: 97.1 F

## 2023-08-16 DIAGNOSIS — Z13.6 SCREENING FOR CARDIOVASCULAR CONDITION: ICD-10-CM

## 2023-08-16 DIAGNOSIS — F41.9 ANXIETY: ICD-10-CM

## 2023-08-16 DIAGNOSIS — F90.2 ATTENTION DEFICIT HYPERACTIVITY DISORDER (ADHD), COMBINED TYPE: Primary | ICD-10-CM

## 2023-08-16 LAB
CHOLEST SERPL-MCNC: 127 MG/DL
HDLC SERPL-MCNC: 47 MG/DL
LDLC SERPL CALC-MCNC: 71 MG/DL
NONHDLC SERPL-MCNC: 80 MG/DL
TRIGL SERPL-MCNC: 46 MG/DL

## 2023-08-16 PROCEDURE — 36415 COLL VENOUS BLD VENIPUNCTURE: CPT | Performed by: PHYSICIAN ASSISTANT

## 2023-08-16 PROCEDURE — 80061 LIPID PANEL: CPT | Performed by: PHYSICIAN ASSISTANT

## 2023-08-16 PROCEDURE — 99213 OFFICE O/P EST LOW 20 MIN: CPT | Performed by: PHYSICIAN ASSISTANT

## 2023-08-16 RX ORDER — DEXTROAMPHETAMINE SACCHARATE, AMPHETAMINE ASPARTATE, DEXTROAMPHETAMINE SULFATE AND AMPHETAMINE SULFATE 7.5; 7.5; 7.5; 7.5 MG/1; MG/1; MG/1; MG/1
30 TABLET ORAL 2 TIMES DAILY
Qty: 60 TABLET | Refills: 0 | Status: SHIPPED | OUTPATIENT
Start: 2023-08-16 | End: 2023-11-26

## 2023-08-16 RX ORDER — DEXTROAMPHETAMINE SACCHARATE, AMPHETAMINE ASPARTATE, DEXTROAMPHETAMINE SULFATE AND AMPHETAMINE SULFATE 7.5; 7.5; 7.5; 7.5 MG/1; MG/1; MG/1; MG/1
30 TABLET ORAL 2 TIMES DAILY
Qty: 60 TABLET | Refills: 0 | Status: SHIPPED | OUTPATIENT
Start: 2023-08-16 | End: 2023-10-27

## 2023-08-16 RX ORDER — ALPRAZOLAM 0.5 MG
TABLET ORAL
Qty: 120 TABLET | Refills: 1 | Status: SHIPPED | OUTPATIENT
Start: 2023-08-16 | End: 2023-10-27

## 2023-08-16 ASSESSMENT — ENCOUNTER SYMPTOMS
ACTIVITY CHANGE: 1
APPETITE CHANGE: 1
DECREASED CONCENTRATION: 1
NERVOUS/ANXIOUS: 1
SLEEP DISTURBANCE: 0

## 2023-08-16 NOTE — PROGRESS NOTES
"  Assessment & Plan     Attention deficit hyperactivity disorder (ADHD), combined type  Able on current meds recheck in 60 days and as needed  - amphetamine-dextroamphetamine (ADDERALL) 30 MG tablet  Dispense: 60 tablet; Refill: 0  - amphetamine-dextroamphetamine (ADDERALL) 30 MG tablet  Dispense: 60 tablet; Refill: 0    Anxiety  Continue current medication  - ALPRAZolam (XANAX) 0.5 MG tablet  Dispense: 120 tablet; Refill: 1    Screening for cardiovascular condition  Labs pending  - Lipid panel reflex to direct LDL Non-fasting  Is due for Pap smear and mammogram but she does not have insurance will consider referring to reproductive health and or well woman's program             Nicotine/Tobacco Cessation:  She reports that she has been smoking cigarettes. She has never used smokeless tobacco.  Nicotine/Tobacco Cessation Plan:             TOMY Sullivan  [facility]    Colt Mejia is a 43 year old, presenting for the following health issues:  Establish Care and Medication Refill      43-year-old with history of anxiety and ADHD she finds that the Adderall has been \"life-changing\" for her  Denies any homicidal or suicidal ideation  She has done counseling in the past she is not doing it currently  She has been trying to watch her weight and make sure that she eats  She is not interested in counseling at this time  She is looking for employment  She is  they have 1 son who is not she has 5050 custody with her ex-    History of Present Illness       Reason for visit:  Meds    She eats 2-3 servings of fruits and vegetables daily.She consumes 3 sweetened beverage(s) daily.She exercises with enough effort to increase her heart rate 60 or more minutes per day.  She exercises with enough effort to increase her heart rate 7 days per week.   She is taking medications regularly.     ADHD Follow-Up (Adult)  Concerns: nONE  Changes since last visit: Stable  Taking " "controlled (daily) medications as prescribed: Yes  Sleep: trouble falling asleep, trouble staying asleep, and restless sleep NOT RELATED TO ADHD, but panic disorder  Adult ADHD Self-Reporting form given to patient?:  No  Currently in counseling: No    Medication Benefits:   Controlled symptoms: Hyperactivity - motor restlessness, Attention span, Distractability, Finishing tasks, and Impulse control      Medication Side Effects:  Reports:  none    ++++++++++++++++++++++++++++++++++++++++++++++++        Review of Systems   Constitutional:  Positive for activity change and appetite change.   Psychiatric/Behavioral:  Positive for decreased concentration. Negative for self-injury, sleep disturbance and suicidal ideas. The patient is nervous/anxious.             Objective    /82   Pulse 88   Temp 97.1  F (36.2  C)   Resp 16   Ht 1.676 m (5' 6\")   Wt 49.9 kg (110 lb)   LMP 08/02/2023 (Approximate)   SpO2 99%   BMI 17.75 kg/m    Body mass index is 17.75 kg/m .  Physical Exam attentive no acute distress's dressed and groomed appropriately  Affect somewhat blunted  Lungs clear to ventilated  Cardiovascular regular rate and rhythm  No tics                        "

## 2023-08-28 DIAGNOSIS — F41.9 ANXIETY: ICD-10-CM

## 2023-08-28 RX ORDER — ALPRAZOLAM 0.5 MG
TABLET ORAL
Qty: 120 TABLET | Refills: 1 | OUTPATIENT
Start: 2023-08-28

## 2023-10-05 DIAGNOSIS — F90.2 ATTENTION DEFICIT HYPERACTIVITY DISORDER (ADHD), COMBINED TYPE: ICD-10-CM

## 2023-10-05 RX ORDER — DEXTROAMPHETAMINE SACCHARATE, AMPHETAMINE ASPARTATE, DEXTROAMPHETAMINE SULFATE AND AMPHETAMINE SULFATE 7.5; 7.5; 7.5; 7.5 MG/1; MG/1; MG/1; MG/1
30 TABLET ORAL 2 TIMES DAILY
Qty: 30 TABLET | Refills: 0 | Status: SHIPPED | OUTPATIENT
Start: 2023-10-05 | End: 2024-02-02

## 2023-10-05 NOTE — TELEPHONE ENCOUNTER
Medication Question or Refill    Contacts         Type Contact Phone/Fax    10/05/2023 09:59 AM CDT Phone (Incoming) Jackie Mcdonough (Self) [phone] (M)            What medication are you calling about (include dose and sig)?:     Preferred Pharmacy:   [facility] #[id number] - [place], [place] - [address] [facility] [address] [place]  [address]  [place] [address]  Phone: [phone] Fax: [phone]      Controlled Substance Agreement on file:   CSA -- Patient Level:     [Media Unavailable] Controlled Substance Agreement - Non - Opioid - Scan on 5/24/2022  3:37 PM: NON-OPIOID CONTROLLED SUBSTANCE AGREEMENT       Who prescribed the medication?: Brodie Bar    Do you need a refill? Yes        Could we send this information to you in [employer] or would you prefer to receive a phone call?:   Patient would prefer a phone call   Okay to leave a detailed message?: Yes at Cell number on file:    Telephone Information:   Mobile [phone]

## 2023-10-27 DIAGNOSIS — F41.9 ANXIETY: ICD-10-CM

## 2023-10-27 DIAGNOSIS — F90.2 ATTENTION DEFICIT HYPERACTIVITY DISORDER (ADHD), COMBINED TYPE: ICD-10-CM

## 2023-10-27 RX ORDER — ALPRAZOLAM 0.5 MG
TABLET ORAL
Qty: 120 TABLET | Refills: 1 | Status: SHIPPED | OUTPATIENT
Start: 2023-10-27 | End: 2024-01-01

## 2023-10-27 RX ORDER — DEXTROAMPHETAMINE SACCHARATE, AMPHETAMINE ASPARTATE, DEXTROAMPHETAMINE SULFATE AND AMPHETAMINE SULFATE 7.5; 7.5; 7.5; 7.5 MG/1; MG/1; MG/1; MG/1
30 TABLET ORAL 2 TIMES DAILY
Qty: 60 TABLET | Refills: 0 | Status: SHIPPED | OUTPATIENT
Start: 2023-10-27 | End: 2023-11-22

## 2023-11-22 ENCOUNTER — TELEPHONE (OUTPATIENT)
Dept: FAMILY MEDICINE | Facility: CLINIC | Age: 43
End: 2023-11-22

## 2023-11-22 DIAGNOSIS — F90.2 ATTENTION DEFICIT HYPERACTIVITY DISORDER (ADHD), COMBINED TYPE: ICD-10-CM

## 2023-11-22 RX ORDER — DEXTROAMPHETAMINE SACCHARATE, AMPHETAMINE ASPARTATE, DEXTROAMPHETAMINE SULFATE AND AMPHETAMINE SULFATE 7.5; 7.5; 7.5; 7.5 MG/1; MG/1; MG/1; MG/1
30 TABLET ORAL 2 TIMES DAILY
Qty: 60 TABLET | Refills: 0 | Status: SHIPPED | OUTPATIENT
Start: 2023-11-22 | End: 2024-02-02

## 2023-11-22 NOTE — TELEPHONE ENCOUNTER
Medication Question or Refill        What medication are you calling about (include dose and sig)?: Aderall 30mg [id number]    Preferred Pharmacy:   [facility] #[id number] - [place], [place] - [address] [facility] [address] [place] [address]  Phone: [phone] Fax: [phone]      Controlled Substance Agreement on file:   CSA -- Patient Level:     [Media Unavailable] Controlled Substance Agreement - Non - Opioid - Scan on 5/24/2022  3:37 PM: NON-OPIOID CONTROLLED SUBSTANCE AGREEMENT       Who prescribed the medication?: Dr Bar    Do you need a refill? Yes    When did you use the medication last? 11/22/23 a.m.    Patient offered an appointment? No    Do you have any questions or concerns?  No      Could we send this information to you in [employer] or would you prefer to receive a phone call?:   Patient would like to be contacted via [employer]

## 2023-11-24 ENCOUNTER — TELEPHONE (OUTPATIENT)
Dept: FAMILY MEDICINE | Facility: CLINIC | Age: 43
End: 2023-11-24

## 2023-11-24 NOTE — TELEPHONE ENCOUNTER
11/24/  General Call      Reason for Call:  switch pharmacies    What are your questions or concerns:  Pt requests amphetamine-dextroamphetamine (ADDERALL) 30 MG tablet be sent to [facility] in [place], [place]    Date of last appointment with provider: 08/16/23    Could we send this information to you in [facility] or would you prefer to receive a phone call?:   Patient would prefer a phone call   Okay to leave a detailed message?: Yes at Cell number on file:    Telephone Information:   Mobile [phone]

## 2023-11-26 RX ORDER — DEXTROAMPHETAMINE SACCHARATE, AMPHETAMINE ASPARTATE, DEXTROAMPHETAMINE SULFATE AND AMPHETAMINE SULFATE 7.5; 7.5; 7.5; 7.5 MG/1; MG/1; MG/1; MG/1
30 TABLET ORAL 2 TIMES DAILY
Qty: 60 TABLET | Refills: 0 | Status: SHIPPED | OUTPATIENT
Start: 2023-11-26 | End: 2024-01-27

## 2023-11-28 RX ORDER — DEXTROAMPHETAMINE SACCHARATE, AMPHETAMINE ASPARTATE, DEXTROAMPHETAMINE SULFATE AND AMPHETAMINE SULFATE 5; 5; 5; 5 MG/1; MG/1; MG/1; MG/1
TABLET ORAL
Qty: 90 TABLET | Refills: 0 | Status: SHIPPED | OUTPATIENT
Start: 2023-11-28 | End: 2023-12-26

## 2023-11-28 NOTE — TELEPHONE ENCOUNTER
She will have to take 20 mg only.  You can't split the extended release. I will send that in for her.    KAH

## 2023-11-28 NOTE — TELEPHONE ENCOUNTER
Can we confirm that this pharmacy has the medication. I have sent it to multiple pharmacies in the past few days.     KAH

## 2023-11-28 NOTE — TELEPHONE ENCOUNTER
It turns out that [facility] in [place] is also out of Adderall    amphetamine-dextroamphetamine (ADDERALL) 30 MG tablet    But they do have the 20 mg tab at patient's original [facility] in   [id number] - [place], [place] - [address] [facility] [address]       Patient is asking if she can get a refill of the 20 mg and she would take a 20 mg and cut another 20 mg tab in half, to reach her 30 mg dose.    She has been out of this medication for several days now.

## 2023-11-28 NOTE — TELEPHONE ENCOUNTER
Left message for pt, requested a call back. Brodie Bar sent in RX for Adderall, take 1.5 tab po BID.

## 2023-12-26 ENCOUNTER — TELEPHONE (OUTPATIENT)
Dept: FAMILY MEDICINE | Facility: CLINIC | Age: 43
End: 2023-12-26

## 2023-12-26 RX ORDER — DEXTROAMPHETAMINE SACCHARATE, AMPHETAMINE ASPARTATE, DEXTROAMPHETAMINE SULFATE AND AMPHETAMINE SULFATE 5; 5; 5; 5 MG/1; MG/1; MG/1; MG/1
TABLET ORAL
Qty: 90 TABLET | Refills: 0 | Status: SHIPPED | OUTPATIENT
Start: 2023-12-26 | End: 2024-02-02

## 2023-12-26 NOTE — TELEPHONE ENCOUNTER
12/26/23  Pt states that [facility] only has adderall in 20mg, not 30mg. She is requesting her prescription be rewritten so she can  the adderall 20 mg.  Nanette

## 2023-12-31 DIAGNOSIS — F41.9 ANXIETY: ICD-10-CM

## 2024-01-01 RX ORDER — ALPRAZOLAM 0.5 MG
TABLET ORAL
Qty: 120 TABLET | Refills: 5 | Status: SHIPPED | OUTPATIENT
Start: 2024-01-01 | End: 2024-01-27

## 2024-01-23 ENCOUNTER — TELEPHONE (OUTPATIENT)
Dept: FAMILY MEDICINE | Facility: CLINIC | Age: 44
End: 2024-01-23

## 2024-01-23 DIAGNOSIS — F41.9 ANXIETY: ICD-10-CM

## 2024-01-23 DIAGNOSIS — F90.2 ATTENTION DEFICIT HYPERACTIVITY DISORDER (ADHD), COMBINED TYPE: ICD-10-CM

## 2024-01-23 RX ORDER — DEXTROAMPHETAMINE SACCHARATE, AMPHETAMINE ASPARTATE, DEXTROAMPHETAMINE SULFATE AND AMPHETAMINE SULFATE 5; 5; 5; 5 MG/1; MG/1; MG/1; MG/1
TABLET ORAL
Qty: 90 TABLET | Refills: 0 | OUTPATIENT
Start: 2024-01-23

## 2024-01-23 RX ORDER — ALPRAZOLAM 0.5 MG
TABLET ORAL
Qty: 120 TABLET | Refills: 5 | OUTPATIENT
Start: 2024-01-23

## 2024-01-23 NOTE — TELEPHONE ENCOUNTER
Please contact patient and assist with scheduling visit for medication management.    Prescriptions were not filled today. Patient is overdue for follow up.    Visit: 8/16/23,  recheck in 60 days

## 2024-01-27 DIAGNOSIS — F41.9 ANXIETY: ICD-10-CM

## 2024-01-27 DIAGNOSIS — F90.2 ATTENTION DEFICIT HYPERACTIVITY DISORDER (ADHD), COMBINED TYPE: ICD-10-CM

## 2024-01-27 RX ORDER — ALPRAZOLAM 0.5 MG
TABLET ORAL
Qty: 120 TABLET | Refills: 5 | Status: SHIPPED | OUTPATIENT
Start: 2024-01-27 | End: 2024-07-26

## 2024-01-27 RX ORDER — DEXTROAMPHETAMINE SACCHARATE, AMPHETAMINE ASPARTATE, DEXTROAMPHETAMINE SULFATE AND AMPHETAMINE SULFATE 7.5; 7.5; 7.5; 7.5 MG/1; MG/1; MG/1; MG/1
30 TABLET ORAL 2 TIMES DAILY
Qty: 60 TABLET | Refills: 0 | Status: SHIPPED | OUTPATIENT
Start: 2024-01-27 | End: 2024-03-18

## 2024-01-31 ENCOUNTER — TELEPHONE (OUTPATIENT)
Dept: FAMILY MEDICINE | Facility: CLINIC | Age: 44
End: 2024-01-31
Payer: COMMERCIAL

## 2024-01-31 NOTE — TELEPHONE ENCOUNTER
Called and left voicemail notifying patient that prescription refill was sent on 1/27/24 so she should have been able to pick it up at the pharmacy already, to call clinic back if there are any problems

## 2024-01-31 NOTE — TELEPHONE ENCOUNTER
"  Medication Question or Refill    Contacts         Type Contact Phone/Fax    01/31/2024 11:27 AM CST Phone (Incoming) Jackie Mcdonough (Self) [phone] (M)        Patient has a medication review appointment on 2/2/24 but just took her last tablet  this morning and has none for the rest of the day and beyond.    Patient is asking if provider will refill or bridge medication until the appointment.      What medication are you calling about (include dose and sig)?:     ALPRAZolam (XANAX) 0.5 MG tablet 120 tablet 5 1/27/2024 -- No   Sig: TAKE 1 TABLET BY MOUTH EVERY MORNING, 1 TABLET AT LUNCH, AND 2 TABLETS AT BEDTIME AS NEEDED FOR ANXIETY.   Sent to pharmacy as: ALPRAZolam 0.5 MG Oral Tablet (XANAX)         Preferred Pharmacy:     [facility] #[id number] [facility] [address]  [address] [address]  Phone: [phone] Fax: [phone]      Controlled Substance Agreement on file:   CSA -- Patient Level:     [Media Unavailable] Controlled Substance Agreement - Non - Opioid - Scan on 5/24/2022  3:37 PM: NON-OPIOID CONTROLLED SUBSTANCE AGREEMENT       Who prescribed the medication?: Terry Bar    Do you need a refill? Yes    When did you use the medication last? Last tablet this morning    Patient offered an appointment? No  has one on 2/2/24    Do you have any questions or concerns?  Yes: \"What is advised if I cannot get any/\"    Could we send this information to you in [facility] or would you prefer to receive a phone call?:   Patient would prefer a phone call   Okay to leave a detailed message?: Yes at Cell number on file:    Telephone Information:   Mobile [phone]     "

## 2024-02-02 ENCOUNTER — OFFICE VISIT (OUTPATIENT)
Dept: FAMILY MEDICINE | Facility: CLINIC | Age: 44
End: 2024-02-02
Payer: COMMERCIAL

## 2024-02-02 VITALS
OXYGEN SATURATION: 99 % | TEMPERATURE: 98 F | WEIGHT: 116 LBS | BODY MASS INDEX: 18.64 KG/M2 | RESPIRATION RATE: 12 BRPM | DIASTOLIC BLOOD PRESSURE: 94 MMHG | HEIGHT: 66 IN | SYSTOLIC BLOOD PRESSURE: 150 MMHG | HEART RATE: 92 BPM

## 2024-02-02 DIAGNOSIS — F90.2 ATTENTION DEFICIT HYPERACTIVITY DISORDER (ADHD), COMBINED TYPE: ICD-10-CM

## 2024-02-02 PROCEDURE — 99213 OFFICE O/P EST LOW 20 MIN: CPT | Performed by: PHYSICIAN ASSISTANT

## 2024-02-02 RX ORDER — DEXTROAMPHETAMINE SACCHARATE, AMPHETAMINE ASPARTATE, DEXTROAMPHETAMINE SULFATE AND AMPHETAMINE SULFATE 7.5; 7.5; 7.5; 7.5 MG/1; MG/1; MG/1; MG/1
30 TABLET ORAL 2 TIMES DAILY
Qty: 60 TABLET | Refills: 0 | Status: SHIPPED | OUTPATIENT
Start: 2024-02-02 | End: 2024-04-17

## 2024-02-02 RX ORDER — DEXTROAMPHETAMINE SACCHARATE, AMPHETAMINE ASPARTATE, DEXTROAMPHETAMINE SULFATE AND AMPHETAMINE SULFATE 7.5; 7.5; 7.5; 7.5 MG/1; MG/1; MG/1; MG/1
30 TABLET ORAL 2 TIMES DAILY
Qty: 30 TABLET | Refills: 0 | Status: SHIPPED | OUTPATIENT
Start: 2024-02-02 | End: 2024-04-12

## 2024-02-02 ASSESSMENT — PATIENT HEALTH QUESTIONNAIRE - PHQ9
SUM OF ALL RESPONSES TO PHQ QUESTIONS 1-9: 4
SUM OF ALL RESPONSES TO PHQ QUESTIONS 1-9: 4
10. IF YOU CHECKED OFF ANY PROBLEMS, HOW DIFFICULT HAVE THESE PROBLEMS MADE IT FOR YOU TO DO YOUR WORK, TAKE CARE OF THINGS AT HOME, OR GET ALONG WITH OTHER PEOPLE: SOMEWHAT DIFFICULT

## 2024-02-02 NOTE — COMMUNITY RESOURCES LIST (ENGLISH)
02/02/2024   [facility]  N/A  For questions about this resource list or additional care needs, please contact your primary care clinic or care manager.  Phone: [phone]   Email: N/A   Address: [address] [place] [address]   Hours: N/A        Food and Nutrition       Food pantry  1  [facility] Distance: 4.35 miles      [facility]   [address] [place] [address]  Language: English  Hours: Wed 10:00 AM - 11:30 AM , Wed 4:30 PM - 6:00 PM  Fees: Free   Phone: (327) 986-5575 Email: VMQtnh9715@[employer] Website: http://www.tic.org     2  [place] [facility] Distance: 9.39 miles      [facility]   [address] [place] [address]  Language: English, Malaysian  Hours: Tue 9:00 AM - 11:00 AM , Tue 3:00 PM - 6:00 PM , Sat 9:00 AM - 11:00 AM  Fees: Free   Phone: (592) 327-4980     SNAP application assistance  3  [employer] [facility] Distance: 19.98 miles      In-Person, Phone/Virtual   [address] [place] [address]  Language: English, Hmong, Chilean, Malaysian  Hours: Mon - Fri 8:00 AM - 4:30 PM  Fees: Free   Phone: (396) 872-4940 Website: https://www.workforceresource.org/     Soup kitchen or free meals  4  [facility] Distance: 8.88 miles      [facility]   [address] [place] [address]  Language: English  Hours: Wed 3:00 PM - 5:00 PM , Sun 2:00 PM - 5:00 PM  Fees: Free   Phone: (556) 578-1697 Email: milli@[employer] Website: http://www.[employer].org/     5  [facility] Distance: 14.44 miles      [facility]   [address] [place] [address]  Language: English  Hours: Thu 5:00 PM - 6:00 PM  Fees: Free   Phone: (214) 838-1144 Email: office@[facility] Website: http://www.[facility].org/          Important Numbers & Websites       Emergency Services   911  [facility]   311  Poison Control   (803)  377-5436  Suicide Prevention Lifeline   (299) 278-2710 (TALK)  Child Abuse Hotline   (233) 137-8981 (4-A-Child)  Sexual Assault Hotline   (834) 176-5607 (HOPE)  National Runaway Safeline   (887) 631-5308 (RUNAWAY)  All-Options Talkline   (884) 187-5660  Substance Abuse Referral   (424) 543-5747 (HELP)

## 2024-02-02 NOTE — PROGRESS NOTES
"  Assessment & Plan     (F90.2) Attention deficit hyperactivity disorder (ADHD), combined type  Comment: Refilled meds for 2 months told her pharmacy can contact us in 2 months for additional fill  Plan: Advised checking at home blood pressure as she has a cough call in 2 to 3 weeks with readings if not adequately controlled we may need to reassess                  Subjective   Jackie is a 43 year old, presenting for the following health issues:  A.D.H.D (Refill. Current dose going well. )      2/2/2024     8:33 AM   Additional Questions   Roomed by LA     43-year-old here to follow-up for ADHD  She is tolerating her medication without difficulty  No palpitations chest pain no headaches no tremor or tics  She has been on this medication for the past few years  Emotionally she is having difficult time has went through a bitter divorce that is now final  She is sleeping well appetite is good  She works for the local [employer]    History of Present Illness       Reason for visit:  Meds    She eats 0-1 servings of fruits and vegetables daily.She consumes 3 sweetened beverage(s) daily.She exercises with enough effort to increase her heart rate 20 to 29 minutes per day.  She exercises with enough effort to increase her heart rate 6 days per week. She is missing 1 dose(s) of medications per week.                     Objective    BP (!) 154/96 (BP Location: Right arm, Patient Position: Sitting, Cuff Size: Adult Regular)   Pulse 92   Temp 98  F (36.7  C) (Tympanic)   Resp 12   Ht 1.676 m (5' 6\")   Wt 52.6 kg (116 lb)   LMP 01/23/2024 (Approximate)   SpO2 99%   BMI 18.72 kg/m    Body mass index is 18.72 kg/m .  Physical Exam  Cardiovascular:      Rate and Rhythm: Normal rate and regular rhythm.   Pulmonary:      Effort: Pulmonary effort is normal.      Breath sounds: Normal breath sounds.   Psychiatric:         Mood and Affect: Mood normal.         Behavior: Behavior normal.         Thought Content: Thought " content normal.         Judgment: Judgment normal.                    Signed Electronically by: TOMY Sullivan

## 2024-02-19 DIAGNOSIS — F90.2 ATTENTION DEFICIT HYPERACTIVITY DISORDER (ADHD), COMBINED TYPE: ICD-10-CM

## 2024-02-19 DIAGNOSIS — F90.2 ATTENTION DEFICIT HYPERACTIVITY DISORDER (ADHD), COMBINED TYPE: Primary | ICD-10-CM

## 2024-02-19 RX ORDER — DEXTROAMPHETAMINE SACCHARATE, AMPHETAMINE ASPARTATE, DEXTROAMPHETAMINE SULFATE AND AMPHETAMINE SULFATE 5; 5; 5; 5 MG/1; MG/1; MG/1; MG/1
TABLET ORAL
Qty: 90 TABLET | Refills: 0 | Status: SHIPPED | OUTPATIENT
Start: 2024-02-19 | End: 2024-07-30

## 2024-02-19 RX ORDER — DEXTROAMPHETAMINE SACCHARATE, AMPHETAMINE ASPARTATE, DEXTROAMPHETAMINE SULFATE AND AMPHETAMINE SULFATE 7.5; 7.5; 7.5; 7.5 MG/1; MG/1; MG/1; MG/1
30 TABLET ORAL 2 TIMES DAILY
Qty: 30 TABLET | Refills: 0 | OUTPATIENT
Start: 2024-02-19

## 2024-02-19 NOTE — TELEPHONE ENCOUNTER
New Medication Request    Contacts         Type Contact Phone/Fax    02/19/2024 10:52 AM CST Phone (Incoming) Jackie Mcdonough (Self) [phone] (M)            What medication are you requesting?:     amphetamine-dextroamphetamine (ADDERALL) 30 MG tablet       Reason for medication request: [facility] only has 20 mg in stock. Patient is asking if she can get 20 mg script and cut them down.    Have you taken this medication before?: Yes: When [facility] was out of the 30 mg tabs    Controlled Substance Agreement on file:   CSA -- Patient Level:     [Media Unavailable] Controlled Substance Agreement - Non - Opioid - Scan on 5/24/2022  3:37 PM: NON-OPIOID CONTROLLED SUBSTANCE AGREEMENT         Patient offered an appointment? No    Preferred Pharmacy:   [id number] [facility] [address] [facility]  [address] [facility] [address]  Phone: [phone] Fax: [phone]      Could we send this information to you in [email or web address] or would you prefer to receive a phone call?:   Patient would prefer a phone call   Okay to leave a detailed message?: Yes at Cell number on file:    Telephone Information:   Mobile [phone]

## 2024-03-18 DIAGNOSIS — F90.2 ATTENTION DEFICIT HYPERACTIVITY DISORDER (ADHD), COMBINED TYPE: ICD-10-CM

## 2024-03-18 RX ORDER — DEXTROAMPHETAMINE SACCHARATE, AMPHETAMINE ASPARTATE, DEXTROAMPHETAMINE SULFATE AND AMPHETAMINE SULFATE 7.5; 7.5; 7.5; 7.5 MG/1; MG/1; MG/1; MG/1
30 TABLET ORAL 2 TIMES DAILY
Qty: 60 TABLET | Refills: 0 | Status: SHIPPED | OUTPATIENT
Start: 2024-03-18 | End: 2024-05-17

## 2024-04-12 ENCOUNTER — TELEPHONE (OUTPATIENT)
Dept: FAMILY MEDICINE | Facility: CLINIC | Age: 44
End: 2024-04-12
Payer: COMMERCIAL

## 2024-04-12 DIAGNOSIS — F90.2 ATTENTION DEFICIT HYPERACTIVITY DISORDER (ADHD), COMBINED TYPE: ICD-10-CM

## 2024-04-12 RX ORDER — DEXTROAMPHETAMINE SACCHARATE, AMPHETAMINE ASPARTATE, DEXTROAMPHETAMINE SULFATE AND AMPHETAMINE SULFATE 7.5; 7.5; 7.5; 7.5 MG/1; MG/1; MG/1; MG/1
30 TABLET ORAL 2 TIMES DAILY
Qty: 10 TABLET | Refills: 0 | Status: SHIPPED | OUTPATIENT
Start: 2024-04-12 | End: 2024-06-11

## 2024-04-12 NOTE — TELEPHONE ENCOUNTER
Called in script for 5 days and asked her to contact PCP then.  This appears to be an early request.    Chas Meza MD    Medication Question or Refill    Contacts         Type Contact Phone/Fax    04/12/2024 09:19 AM CDT Phone (Incoming) Jackie Mcdonough (Self) [phone] (M)            What medication are you calling about (include dose and sig)?: amphetamine    Preferred Pharmacy:   [facility][id number] [place], [place] - [address] [facility] [address] [facility]  [address] [facility] [address] [facility] [address]  Phone: [phone] Fax: [phone]      Controlled Substance Agreement on file:   CSA -- Patient Level:     [Media Unavailable] Controlled Substance Agreement - Non - Opioid - Scan on 5/24/2022  3:37 PM: NON-OPIOID CONTROLLED SUBSTANCE AGREEMENT       Who prescribed the medication?: pcp    Do you need a refill? Yes    When did you use the medication last? today    Patient offered an appointment? No    Do you have any questions or concerns?  Yes: pt has enough for today then will be out      Could we send this information to you in [facility] or would you prefer to receive a phone call?:   Patient would prefer a phone call   Okay to leave a detailed message?: Yes at Home number on file [phone] (home)

## 2024-04-17 ENCOUNTER — TELEPHONE (OUTPATIENT)
Dept: FAMILY MEDICINE | Facility: CLINIC | Age: 44
End: 2024-04-17
Payer: COMMERCIAL

## 2024-04-17 DIAGNOSIS — F90.2 ATTENTION DEFICIT HYPERACTIVITY DISORDER (ADHD), COMBINED TYPE: ICD-10-CM

## 2024-04-17 RX ORDER — DEXTROAMPHETAMINE SACCHARATE, AMPHETAMINE ASPARTATE, DEXTROAMPHETAMINE SULFATE AND AMPHETAMINE SULFATE 7.5; 7.5; 7.5; 7.5 MG/1; MG/1; MG/1; MG/1
30 TABLET ORAL 2 TIMES DAILY
Qty: 60 TABLET | Refills: 0 | Status: SHIPPED | OUTPATIENT
Start: 2024-04-17 | End: 2024-06-14

## 2024-04-17 NOTE — TELEPHONE ENCOUNTER
Patient is calling to request a refill of medication:      amphetamine-dextroamphetamine (ADDERALL) 30 MG tablet   Dispense: 10 tablet     Last OV: 2/2/24

## 2024-05-16 DIAGNOSIS — F90.2 ATTENTION DEFICIT HYPERACTIVITY DISORDER (ADHD), COMBINED TYPE: ICD-10-CM

## 2024-05-16 NOTE — TELEPHONE ENCOUNTER
Reason for Call:  Other prescription    Detailed comments: REFILL REQUEST      amphetamine-dextroamphetamine (ADDERALL) 30 MG tablet [69651]     Phone Number Patient can be reached at: Other phone number: [phone]    Best Time: ASAP    Can we leave a detailed message on this number? YES    Call taken on 5/16/2024 at 1:08 PM by Jackie Mckeon

## 2024-05-17 RX ORDER — DEXTROAMPHETAMINE SACCHARATE, AMPHETAMINE ASPARTATE, DEXTROAMPHETAMINE SULFATE AND AMPHETAMINE SULFATE 7.5; 7.5; 7.5; 7.5 MG/1; MG/1; MG/1; MG/1
30 TABLET ORAL 2 TIMES DAILY
Qty: 60 TABLET | Refills: 0 | Status: SHIPPED | OUTPATIENT
Start: 2024-05-17 | End: 2024-08-28

## 2024-05-17 NOTE — TELEPHONE ENCOUNTER
Addended by: Eldon Flores on: 6/4/2021 10:23 AM     Modules accepted: Orders Routing refill request to provider for review/approval because:  Drug not on the [facility] refill protocol   KAH o/c; routing to covering provider  Last Written Prescription Date:  4/17/24  Last Fill Quantity: 60,  # refills: 0   Last office visit: 2/2/2024 ; last virtual visit: 11/1/2022 with prescribing provider

## 2024-06-11 DIAGNOSIS — F90.2 ATTENTION DEFICIT HYPERACTIVITY DISORDER (ADHD), COMBINED TYPE: ICD-10-CM

## 2024-06-11 RX ORDER — DEXTROAMPHETAMINE SACCHARATE, AMPHETAMINE ASPARTATE, DEXTROAMPHETAMINE SULFATE AND AMPHETAMINE SULFATE 7.5; 7.5; 7.5; 7.5 MG/1; MG/1; MG/1; MG/1
30 TABLET ORAL 2 TIMES DAILY
Qty: 10 TABLET | Refills: 0 | Status: SHIPPED | OUTPATIENT
Start: 2024-06-11

## 2024-06-13 ENCOUNTER — TELEPHONE (OUTPATIENT)
Dept: FAMILY MEDICINE | Facility: CLINIC | Age: 44
End: 2024-06-13
Payer: COMMERCIAL

## 2024-06-13 NOTE — TELEPHONE ENCOUNTER
General Call    Contacts         Type Contact Phone/Fax    06/13/2024 10:39 AM CDT Phone (Incoming) Jackie Mcdonough (Self) [phone] (M)          Reason for Call:  medication question    What are your questions or concerns:  Pt calling, inquiring why her prescription for adderall was only for 10 tablets. Chart reviewed; pt is due for med check. Virtual visit scheduled next day.     Date of last appointment with provider: 2/2024

## 2024-06-14 ENCOUNTER — VIRTUAL VISIT (OUTPATIENT)
Dept: FAMILY MEDICINE | Facility: CLINIC | Age: 44
End: 2024-06-14
Payer: COMMERCIAL

## 2024-06-14 DIAGNOSIS — F90.2 ATTENTION DEFICIT HYPERACTIVITY DISORDER (ADHD), COMBINED TYPE: ICD-10-CM

## 2024-06-14 DIAGNOSIS — F41.1 GENERALIZED ANXIETY DISORDER: Primary | ICD-10-CM

## 2024-06-14 PROCEDURE — 99213 OFFICE O/P EST LOW 20 MIN: CPT | Mod: 95 | Performed by: PHYSICIAN ASSISTANT

## 2024-06-14 RX ORDER — DEXTROAMPHETAMINE SACCHARATE, AMPHETAMINE ASPARTATE, DEXTROAMPHETAMINE SULFATE AND AMPHETAMINE SULFATE 7.5; 7.5; 7.5; 7.5 MG/1; MG/1; MG/1; MG/1
30 TABLET ORAL 2 TIMES DAILY
Qty: 60 TABLET | Refills: 0 | Status: SHIPPED | OUTPATIENT
Start: 2024-06-14

## 2024-06-14 NOTE — PROGRESS NOTES
Jackie is a 44 year old who is being evaluated via a billable video visit.    How would you like to obtain your AVS? MyChart  If the video visit is dropped, the invitation should be resent by: Text to cell phone: [phone]  Will anyone else be joining your video visit? No      Assessment & Plan     (F41.1) Generalized anxiety disorder  (primary encounter diagnosis)  Comment: Stable  Plan: Continue current therapy PDMP checked    (F90.2) Attention deficit hyperactivity disorder (ADHD), combined type  Comment: Stable  Plan: amphetamine-dextroamphetamine (ADDERALL) 30 MG         tablet, amphetamine-dextroamphetamine         (ADDERALL) 30 MG tablet,         amphetamine-dextroamphetamine (ADDERALL) 30 MG         tablet        Continue current therapy she is due to come in person for her next visit so we can update her CSA                Subjective   Jackie is a 44 year old, presenting for the following health issues:  A.D.H.D        2/2/2024     8:33 AM   Additional Questions   Roomed by STEFAN Best for medication check.  She tolerates her meds fine for the most part she sleeps well  No side effects from her medication  Reminded that she needs to come in person for next visit we need to update her CSA                     Objective           Vitals:  No vitals were obtained today due to virtual visit.    Physical Exam   GENERAL: alert and no distress  EYES: Eyes grossly normal to inspection.  No discharge or erythema, or obvious scleral/conjunctival abnormalities.  RESP: No audible wheeze, cough, or visible cyanosis.    SKIN: Visible skin clear. No significant rash, abnormal pigmentation or lesions.  NEURO: Cranial nerves grossly intact.  Mentation and speech appropriate for age.  PSYCH: Appropriate affect, tone, and pace of words          Video-Visit Details    Type of service:  Video Visit   Originating Location (pt. Location): Home    Distant Location (provider location):  On-site  Platform used for Video Visit:  Doximity  Signed Electronically by: TOMY Sullivan

## 2024-07-12 ENCOUNTER — TELEPHONE (OUTPATIENT)
Dept: FAMILY MEDICINE | Facility: CLINIC | Age: 44
End: 2024-07-12
Payer: COMMERCIAL

## 2024-07-12 NOTE — TELEPHONE ENCOUNTER
Medication Question or Refill    Contacts       Contact Date/Time Type Contact Phone/Fax    07/12/2024 01:36 PM CDT Phone (Incoming) Jackie Mcdonough (Self) [phone] (M)     Ok to leave a detailed voicemail            What medication are you calling about (include dose and sig)?: amphetamine-dextroamphetamine (ADDERALL) 30 MG tablet     Preferred Pharmacy:   [id number] [address]  [address] [facility] [address] [facility] [address]  Phone: [phone] Fax: [phone]      Controlled Substance Agreement on file:   CSA -- Patient Level:     [Media Unavailable] Controlled Substance Agreement - Non - Opioid - Scan on 5/24/2022  3:37 PM: NON-OPIOID CONTROLLED SUBSTANCE AGREEMENT       Who prescribed the medication?: Brodie Bar    Do you need a refill? Yes    When did you use the medication last? 7/12/24    Patient offered an appointment? No    Do you have any questions or concerns?  No      Could we send this information to you in [facility] or would you prefer to receive a phone call?:   Patient would prefer a phone call   Okay to leave a detailed message?: Yes at Cell number on file:    Telephone Information:   Mobile [phone]

## 2024-07-12 NOTE — TELEPHONE ENCOUNTER
Left detailed message informing patient Brodie Bar fill this on 6/14/24 for 3 months (separate fills) on 6/14/24. She will need to contact her pharmacy

## 2024-07-26 ENCOUNTER — TELEPHONE (OUTPATIENT)
Dept: FAMILY MEDICINE | Facility: CLINIC | Age: 44
End: 2024-07-26
Payer: COMMERCIAL

## 2024-07-26 DIAGNOSIS — F90.2 ATTENTION DEFICIT HYPERACTIVITY DISORDER (ADHD), COMBINED TYPE: ICD-10-CM

## 2024-07-26 DIAGNOSIS — F41.9 ANXIETY: ICD-10-CM

## 2024-07-26 RX ORDER — ALPRAZOLAM 0.5 MG
TABLET ORAL
Qty: 120 TABLET | Refills: 0 | Status: SHIPPED | OUTPATIENT
Start: 2024-07-26 | End: 2024-08-14

## 2024-07-26 NOTE — TELEPHONE ENCOUNTER
General Call    Contacts       Contact Date/Time Type Contact Phone/Fax    07/26/2024 11:11 AM CDT Phone (Incoming) Jackie Mcdonough (Self) [phone] (M)          Reason for Call:  Pt 10 pills short on Adderall.    What are your questions or concerns:  Pt wanted to let PCP know that she is 10 pills short on her Adderall 30 Mg. Pt states she left her medication in the car and he car was unlocked. Pt states she isn't sure if someone took her medication or she accidentally took them? Pt is wondering if she can get 10 of the 30 Mg tablets of her Adderall?    Pt is also wondering if she can get a Rx for 10 Mg  for night time? Pt states she used to take the 10 Mg at night and said that it used to her help her out a lot.     Date of last appointment with provider: 06/14/2024 virtual visit    Could we send this information to you in [facility] or would you prefer to receive a phone call?:   Patient would prefer a phone call    Okay to leave a detailed message?: Yes at Cell number on file:    Telephone Information:   Mobile [phone]     Zoë Boo

## 2024-07-27 ENCOUNTER — TELEPHONE (OUTPATIENT)
Dept: NURSING | Facility: CLINIC | Age: 44
End: 2024-07-27
Payer: COMMERCIAL

## 2024-07-27 ENCOUNTER — HEALTH MAINTENANCE LETTER (OUTPATIENT)
Age: 44
End: 2024-07-27

## 2024-07-27 NOTE — TELEPHONE ENCOUNTER
Patient calling back regarding her medication request. Advised the Alprazolam was sent in with the request for the Adderall still pending. Patient to call back Monday to discuss with PCP.     Marli Sheets RN 07/27/24 11:44 AM   [facility] Triage Nurse Advisor

## 2024-07-29 ENCOUNTER — TELEPHONE (OUTPATIENT)
Dept: FAMILY MEDICINE | Facility: CLINIC | Age: 44
End: 2024-07-29

## 2024-07-29 RX ORDER — DEXTROAMPHETAMINE SACCHARATE, AMPHETAMINE ASPARTATE, DEXTROAMPHETAMINE SULFATE AND AMPHETAMINE SULFATE 7.5; 7.5; 7.5; 7.5 MG/1; MG/1; MG/1; MG/1
30 TABLET ORAL 2 TIMES DAILY
Qty: 20 TABLET | Refills: 0 | Status: CANCELLED | OUTPATIENT
Start: 2024-07-29

## 2024-07-29 NOTE — TELEPHONE ENCOUNTER
S-(situation):   Please see previous message.    B-(background):   6/11/2024 (#10 tabs)  6/14/2024 6/14/2024 (fill in 30 days) 7/14/2024 6/14/2024 (fill in 60 days) 8/14/2024    A-(assessment):   Patient reports Adderall was left in car (and either stolen or she took the rest) and is now short RX.  Patient would like to adjust night time dosing.    R-(recommendations):   RN called and left message.    Advise Appointment with a provider  To write RX for controlled substance as PCP is out of clinic.  To discuss medication management    Advised to contact police to report stolen controlled substance.      Torie RN, BSN  [place], [place]

## 2024-07-29 NOTE — TELEPHONE ENCOUNTER
Patient called to follow-up with her Adderall medication request. Her last does was this morning and now she is out of Adderall completley. She had her Adderall in her car and it was unlocked and it disappeared.    LEROY Cueva

## 2024-07-29 NOTE — TELEPHONE ENCOUNTER
S-(situation):   Please see Previous Messages.    B-(background):   Patient PCP is out of clinic for extended time.    A-(assessment):   Patient reports that her RX is missing from  her car.   Patient declines RN suggestion to contact police, and file report.    6/11/2024 (#10 tabs)  6/14/2024 6/14/2024 (fill in 30 days) 7/14/2024 6/14/2024 (fill in 60 days) 8/14/2024      R-(recommendations):   Patient is scheduled to see covering Provider 7/30/24 for (IN PERSON) visit.  Patient aware that covering provider can review (a visit is not a guarantee of a new RX).  Advised that appointment to be in person, in case provider requires any lab test.      LEROY Vogt, BSN  [place], [place]

## 2024-07-30 ENCOUNTER — OFFICE VISIT (OUTPATIENT)
Dept: FAMILY MEDICINE | Facility: CLINIC | Age: 44
End: 2024-07-30
Payer: COMMERCIAL

## 2024-07-30 VITALS
HEIGHT: 67 IN | TEMPERATURE: 97.7 F | RESPIRATION RATE: 12 BRPM | HEART RATE: 64 BPM | BODY MASS INDEX: 18.11 KG/M2 | WEIGHT: 115.4 LBS | OXYGEN SATURATION: 99 % | SYSTOLIC BLOOD PRESSURE: 160 MMHG | DIASTOLIC BLOOD PRESSURE: 58 MMHG

## 2024-07-30 DIAGNOSIS — R03.0 ELEVATED BLOOD PRESSURE READING WITHOUT DIAGNOSIS OF HYPERTENSION: ICD-10-CM

## 2024-07-30 DIAGNOSIS — F90.2 ATTENTION DEFICIT HYPERACTIVITY DISORDER (ADHD), COMBINED TYPE: Primary | ICD-10-CM

## 2024-07-30 PROCEDURE — G2211 COMPLEX E/M VISIT ADD ON: HCPCS | Performed by: NURSE PRACTITIONER

## 2024-07-30 PROCEDURE — 99213 OFFICE O/P EST LOW 20 MIN: CPT | Performed by: NURSE PRACTITIONER

## 2024-07-30 RX ORDER — DEXTROAMPHETAMINE SACCHARATE, AMPHETAMINE ASPARTATE, DEXTROAMPHETAMINE SULFATE AND AMPHETAMINE SULFATE 7.5; 7.5; 7.5; 7.5 MG/1; MG/1; MG/1; MG/1
30 TABLET ORAL 2 TIMES DAILY
Qty: 60 TABLET | Refills: 0 | Status: SHIPPED | OUTPATIENT
Start: 2024-07-30 | End: 2024-08-29

## 2024-07-30 ASSESSMENT — ENCOUNTER SYMPTOMS
SLEEP DISTURBANCE: 0
HYPERACTIVE: 1
CARDIOVASCULAR NEGATIVE: 1
RESPIRATORY NEGATIVE: 1
NERVOUS/ANXIOUS: 1
APPETITE CHANGE: 0
PALPITATIONS: 0
GASTROINTESTINAL NEGATIVE: 1

## 2024-07-30 NOTE — PROGRESS NOTES
Assessment & Plan     Attention deficit hyperactivity disorder (ADHD), combined type  Early refill approved.  PDMP reviewed and has been appropriate for over the last year without history of early refills.  She does have some occurrences where she received a smaller amount of pills due to supply issues.  She is unsure if her pills were stolen or if she misplaced them.  She has no history of this behavior.  We spent some time discussing the importance of keeping her pills safe, locked up.  She plans to just keep 1 to 2 days worth of pills with her and keep the rest locked up at home.  She lives with her parents and her son.  Recommend follow-up in 1 month with PCP.  - amphetamine-dextroamphetamine (ADDERALL) 30 MG tablet; Take 1 tablet (30 mg) by mouth 2 times daily for 30 days  - PRIMARY CARE FOLLOW-UP SCHEDULING; Future    Elevated blood pressure reading without diagnosis of hypertension  Blood pressure is quite elevated but she did not take her alprazolam today.  I recommend follow-up in 1 month..pcpump   - PRIMARY CARE FOLLOW-UP SCHEDULING; Future          Nicotine/Tobacco Cessation  She reports that she has been smoking cigarettes. She has never used smokeless tobacco.  Nicotine/Tobacco Cessation Plan  Information offered: Patient not interested at this time            Colt Mejia is a 44 year old, presenting for the following health issues:  Refill Request (Med refill)        7/30/2024     4:46 PM   Additional Questions   Roomed by LYNETTE Gambino     Jackie presents today for medication refills.  She describes she ran out of her Adderall medication early, takes 30 mg twice daily.  Last filled on 7/14/2024.  She ran out today.  She is very anxious today and also did not take alprazolam.  Blood pressure is elevated she relates this to being stress and not taking alprazolam.  States she typically keeps her pills in her purse in her car.  Does not know what happened.  Does not know if was told medication or  "if she lost it.  States her head is spinning.  She lives with her parents and her son.  She does not think that any family member will take her medication.  This medication for 15 years.  We did review the PDMP together and she has been refilling appropriately for the last year.  She did have some shorter prescriptions due to medication supply issues.  She describes she used to be on extended release Adderall but it caused palpitations.         History of Present Illness       Reason for visit:  Meds    She eats 0-1 servings of fruits and vegetables daily.She consumes 2 sweetened beverage(s) daily.She exercises with enough effort to increase her heart rate 30 to 60 minutes per day.  She exercises with enough effort to increase her heart rate 5 days per week. She is missing 2 dose(s) of medications per week.                 Review of Systems   Constitutional:  Negative for appetite change.   Respiratory: Negative.     Cardiovascular: Negative.  Negative for palpitations.   Gastrointestinal: Negative.    Psychiatric/Behavioral:  Negative for sleep disturbance. The patient is nervous/anxious and is hyperactive.            Objective    BP (!) 158/90 (BP Location: Right arm, Patient Position: Sitting, Cuff Size: Adult Small)   Pulse 64   Temp 97.7  F (36.5  C) (Tympanic)   Resp 12   Ht 1.702 m (5' 7\")   Wt 52.3 kg (115 lb 6.4 oz)   SpO2 99%   BMI 18.07 kg/m    Body mass index is 18.07 kg/m .  Physical Exam  HENT:      Head: Normocephalic and atraumatic.   Cardiovascular:      Rate and Rhythm: Normal rate and regular rhythm.   Pulmonary:      Effort: Pulmonary effort is normal.      Breath sounds: Normal breath sounds.   Skin:     Capillary Refill: Capillary refill takes less than 2 seconds.   Neurological:      Mental Status: She is alert and oriented to person, place, and time.   Psychiatric:         Attention and Perception: Attention normal.         Mood and Affect: Mood is anxious.         Speech: Speech " normal.         Behavior: Behavior normal. Behavior is cooperative.         Thought Content: Thought content normal.         Cognition and Memory: Cognition and memory normal.         Judgment: Judgment normal.                    Signed Electronically by: ALEX Blair CNP

## 2024-08-14 DIAGNOSIS — F41.9 ANXIETY: ICD-10-CM

## 2024-08-14 RX ORDER — ALPRAZOLAM 0.5 MG
TABLET ORAL
Qty: 120 TABLET | Refills: 0 | OUTPATIENT
Start: 2024-08-14

## 2024-08-14 RX ORDER — ALPRAZOLAM 1 MG
TABLET ORAL
Qty: 60 TABLET | Refills: 0 | Status: SHIPPED | OUTPATIENT
Start: 2024-08-14 | End: 2024-09-16

## 2024-08-14 NOTE — TELEPHONE ENCOUNTER
"    6/14/24 (see provider note): Continue current therapy she is due to come in person for her next visit so we can update her CSA.      Last RX: 7/26/2024  Max 4 tabs daily  #120     RN called:  [facility]: [phone]   And confirmed that   #60 dispensed.       Insurance Max: 4 tabs daily. Plan limit exceeded.    RN attempted to call Patient and LM for patient to check My Chart Message:  I spoke with [facility] for clarification of partial fill for your alprazolam.    The pharmacist stated that \" Plan limit exceeded\" and this is the rationale for a partial fill for #60 tabs.    Please contact your Insurance for clarification for coverage of your medication and limits.    A message has been routed to your provider for your request for a new prescription for #60 tabs.  "

## 2024-08-14 NOTE — TELEPHONE ENCOUNTER
Call with pharmacy, they are closed until 2pm. Will call back to confirm quantity of xanax dispensed.

## 2024-08-14 NOTE — TELEPHONE ENCOUNTER
Patient calling to state that mistake or mix-up happened when Rx was filled on/around 7/26/24 fill.    Pt states only Qty of 60 was dispensed to her from pharmacy.  New request of refill from Pharmacy is for remaining qty of 60.

## 2024-08-14 NOTE — TELEPHONE ENCOUNTER
Medication Question or Refill        What medication are you calling about (include dose and sig)?: meme (xanax)    Preferred Pharmacy:   [id number] - [place], [place] - [address] [facility]  [address] [facility] [address]  Phone: [phone] Fax: [phone]      Controlled Substance Agreement on file:   CSA -- Patient Level:     [Media Unavailable] Controlled Substance Agreement - Non - Opioid - Scan on 5/24/2022  3:37 PM: NON-OPIOID CONTROLLED SUBSTANCE AGREEMENT       Who prescribed the medication?: cl lopes    Do you need a refill? Yes    When did you use the medication last? yesterday    Patient offered an appointment? No    Do you have any questions or concerns?  Yes: takes medication 4 times a day, only has 3 pills left. Would like a months supply.      Could we send this information to you in [employer] or would you prefer to receive a phone call?:   Patient would prefer a phone call   Okay to leave a detailed message?: Yes at [phone]

## 2024-08-15 NOTE — TELEPHONE ENCOUNTER
"-- [facility] Provider's name]. The clinical team will fulfill your request as soon as they review your message. \""    " Spoke with pharmacist at [facility] and gave provider's response.   They will note this in her records.

## 2024-08-15 NOTE — TELEPHONE ENCOUNTER
Fax received from [facility] stating patient is wanting to fill prescription today 8/15/24, which would be 5 days early (per insurance).    [facility]"s requesting call or fax stating this is OK to fill early on 8/15/24.

## 2024-08-16 ENCOUNTER — TELEPHONE (OUTPATIENT)
Dept: FAMILY MEDICINE | Facility: CLINIC | Age: 44
End: 2024-08-16
Payer: COMMERCIAL

## 2024-08-16 NOTE — TELEPHONE ENCOUNTER
General Call    Contacts       Contact Date/Time Type Contact Phone/Fax    08/16/2024 09:35 AM CDT Phone (Incoming) Jackie Mcdonough (Self) [phone] (M)          Reason for Call:  Patient called regarding her request to get her alprazolam early.    Patient would like a nurse to call her.  Please Advise.    What are your questions or concerns:  see above    Date of last appointment with provider:     Could we send this information to you in [employer] or would you prefer to receive a phone call?:   Patient would prefer a phone call   Okay to leave a detailed message?: Yes at Cell number on file:    Telephone Information:   Mobile [phone]

## 2024-08-16 NOTE — TELEPHONE ENCOUNTER
Called and left message for patient, requested return call to discuss below.     Alprazolam 1 mg tab # 60 was sent 8/14/24.

## 2024-08-27 ENCOUNTER — TELEPHONE (OUTPATIENT)
Dept: FAMILY MEDICINE | Facility: CLINIC | Age: 44
End: 2024-08-27
Payer: COMMERCIAL

## 2024-08-27 NOTE — TELEPHONE ENCOUNTER
Medication Question or Refill    Contacts       Contact Date/Time Type Contact Phone/Fax    08/27/2024 06:52 PM CDT Phone (Incoming) Jackie Mcdonough (Self) [phone] (M)            What medication are you calling about (include dose and sig)?: amphetamine-dextroamphetamine (ADDERALL) 30 MG tablet    Preferred Pharmacy:   [id number] [place], [place] - [address]  [address] [address]  Phone: [phone] Fax: [phone]      Controlled Substance Agreement on file:   CSA -- Patient Level:     [Media Unavailable] Controlled Substance Agreement - Non - Opioid - Scan on 5/24/2022  3:37 PM: NON-OPIOID CONTROLLED SUBSTANCE AGREEMENT       Who prescribed the medication?: Brodie Bar PA    Do you need a refill? Yes    When did you use the medication last? 08/27/2024    Patient offered an appointment? No    Do you have any questions or concerns?  No  The patient called today asking for a refill of the amphetamine-dextroamphetamine (ADDERALL) 30 MG tablet.  The patient states running out of this medication.    Could we send this information to you in [facility] or would you prefer to receive a phone call?:   Patient would prefer a phone call   Okay to leave a detailed message?: Yes at Cell number on file:    Telephone Information:   Mobile [phone]

## 2024-08-28 DIAGNOSIS — F90.2 ATTENTION DEFICIT HYPERACTIVITY DISORDER (ADHD), COMBINED TYPE: ICD-10-CM

## 2024-08-28 RX ORDER — DEXTROAMPHETAMINE SACCHARATE, AMPHETAMINE ASPARTATE, DEXTROAMPHETAMINE SULFATE AND AMPHETAMINE SULFATE 7.5; 7.5; 7.5; 7.5 MG/1; MG/1; MG/1; MG/1
30 TABLET ORAL 2 TIMES DAILY
Qty: 60 TABLET | Refills: 0 | Status: SHIPPED | OUTPATIENT
Start: 2024-08-28 | End: 2024-09-25

## 2024-09-16 DIAGNOSIS — F41.9 ANXIETY: ICD-10-CM

## 2024-09-16 RX ORDER — ALPRAZOLAM 1 MG
TABLET ORAL
Qty: 60 TABLET | Refills: 0 | Status: SHIPPED | OUTPATIENT
Start: 2024-09-16

## 2024-09-16 NOTE — TELEPHONE ENCOUNTER
Order/Referral Request    Who is requesting:   Medication Question or Refill    Contacts       Contact Date/Time Type Contact Phone/Fax    09/16/2024 10:24 AM CDT Phone (Incoming) Jackie Mcdonough (Self) [phone] (M)            What medication are you calling about (include dose and sig)?: ALPRAZolam (XANAX) 1 MG tablet     Preferred Pharmacy:   [id number] [place] [address]  [address] [address]  Phone: [phone] Fax: [phone]      Controlled Substance Agreement on file:   CSA -- Patient Level:     [Media Unavailable] Controlled Substance Agreement - Non - Opioid - Scan on 5/24/2022  3:37 PM: NON-OPIOID CONTROLLED SUBSTANCE AGREEMENT       Who prescribed the medication?: Brodie Bar    Do you need a refill? Yes    Do you have any questions or concerns?  No      Could we send this information to you in [facility] or would you prefer to receive a phone call?:   Patient would prefer a phone call   Okay to leave a detailed message?: Yes at Cell number on file:    Telephone Information:   Mobile [phone]

## 2024-09-25 DIAGNOSIS — F90.2 ATTENTION DEFICIT HYPERACTIVITY DISORDER (ADHD), COMBINED TYPE: ICD-10-CM

## 2024-09-25 RX ORDER — DEXTROAMPHETAMINE SACCHARATE, AMPHETAMINE ASPARTATE, DEXTROAMPHETAMINE SULFATE AND AMPHETAMINE SULFATE 7.5; 7.5; 7.5; 7.5 MG/1; MG/1; MG/1; MG/1
30 TABLET ORAL 2 TIMES DAILY
Qty: 60 TABLET | Refills: 0 | Status: SHIPPED | OUTPATIENT
Start: 2024-09-25

## 2024-10-11 DIAGNOSIS — F41.9 ANXIETY: ICD-10-CM

## 2024-10-11 DIAGNOSIS — F90.2 ATTENTION DEFICIT HYPERACTIVITY DISORDER (ADHD), COMBINED TYPE: ICD-10-CM

## 2024-10-11 NOTE — TELEPHONE ENCOUNTER
"  Medication Question or Refill    Contacts       Contact Date/Time Type Contact Phone/Fax    10/11/2024 12:28 PM CDT Phone (Incoming) Jackie Mcdonough (Self) [phone] (M)            What medication are you calling about (include dose and sig)?:     ALPRAZolam (XANAX) 1 MG tablet 60 tablet 0 9/16/2024 -- No   Sig: TAKE ONE-HALF TABLET EVERY MORNING, THEN ONE-HALF TABLET AT LUNCH, THEN 1 EVERY NIGHT AT BEDTIME.   Sent to pharmacy as: ALPRAZolam 1 MG Oral Tablet (XANAX)     amphetamine-dextroamphetamine (ADDERALL) 30 MG tablet 60 tablet 0 9/25/2024 -- No  Sig - Route: Take 1 tablet (30 mg) by mouth 2 times daily. - Oral  Sent to pharmacy as: Amphetamine-Dextroamphetamine 30 MG Oral Tablet (Adderall)      Preferred Pharmacy:   [id number] [address] [place] [address]  [address] [facility] [address]  Phone: [phone] Fax: [phone]      Controlled Substance Agreement on file:   CSA -- Patient Level:     [Media Unavailable] Controlled Substance Agreement - Non - Opioid - Scan on 5/24/2022  3:37 PM: NON-OPIOID CONTROLLED SUBSTANCE AGREEMENT       Who prescribed the medication?: Brodie Bar    Do you need a refill? Yes, \"thought is was better to request than wait until Monday\"    When did you use the medication last?  Patient did not say    Patient offered an appointment? Yes: Patient didn't think she needs one    Do you have any questions or concerns?  No      Could we send this information to you in [facility] or would you prefer to receive a phone call?:   Patient would prefer a phone call   Okay to leave a detailed message?: Yes at Cell number on file:    Telephone Information:   Mobile [phone]     "

## 2024-10-11 NOTE — TELEPHONE ENCOUNTER
Routing refill request to provider for review/approval because:  Drug not on the [facility] refill protocol     Alprazolam last written 9/16/24 qty 60  Adderall 30mg last written 9/25/24 qty 60  Last OV 7/30/23

## 2024-10-12 RX ORDER — ALPRAZOLAM 1 MG/1
TABLET ORAL
Qty: 60 TABLET | Refills: 0 | OUTPATIENT
Start: 2024-10-12

## 2024-10-12 RX ORDER — DEXTROAMPHETAMINE SACCHARATE, AMPHETAMINE ASPARTATE, DEXTROAMPHETAMINE SULFATE AND AMPHETAMINE SULFATE 7.5; 7.5; 7.5; 7.5 MG/1; MG/1; MG/1; MG/1
30 TABLET ORAL 2 TIMES DAILY
Qty: 60 TABLET | Refills: 0 | OUTPATIENT
Start: 2024-10-12

## 2024-10-14 DIAGNOSIS — F41.9 ANXIETY: ICD-10-CM

## 2024-10-14 RX ORDER — ALPRAZOLAM 1 MG/1
TABLET ORAL
Qty: 60 TABLET | Refills: 2 | Status: SHIPPED | OUTPATIENT
Start: 2024-10-14

## 2024-10-17 RX ORDER — DEXTROAMPHETAMINE SACCHARATE, AMPHETAMINE ASPARTATE, DEXTROAMPHETAMINE SULFATE AND AMPHETAMINE SULFATE 7.5; 7.5; 7.5; 7.5 MG/1; MG/1; MG/1; MG/1
30 TABLET ORAL 2 TIMES DAILY
Qty: 60 TABLET | Refills: 0 | Status: SHIPPED | OUTPATIENT
Start: 2024-10-25

## 2024-10-17 NOTE — TELEPHONE ENCOUNTER
Pt called back to see when she can get her Rx filled. Pt was informed last Rx was filled on 9/25/2024; next fill date would be 10/25.    Pt would like to ask PCP to send Rx with Rx date for 10/25    Rx pended with start date of 10/25/2024; ok to send in for patient to be able to get when due?

## 2024-11-26 ENCOUNTER — VIRTUAL VISIT (OUTPATIENT)
Dept: FAMILY MEDICINE | Facility: CLINIC | Age: 44
End: 2024-11-26
Payer: COMMERCIAL

## 2024-11-26 DIAGNOSIS — F90.2 ATTENTION DEFICIT HYPERACTIVITY DISORDER (ADHD), COMBINED TYPE: ICD-10-CM

## 2024-11-26 PROCEDURE — 99213 OFFICE O/P EST LOW 20 MIN: CPT | Mod: 95 | Performed by: PHYSICIAN ASSISTANT

## 2024-11-26 RX ORDER — DEXTROAMPHETAMINE SACCHARATE, AMPHETAMINE ASPARTATE, DEXTROAMPHETAMINE SULFATE AND AMPHETAMINE SULFATE 7.5; 7.5; 7.5; 7.5 MG/1; MG/1; MG/1; MG/1
30 TABLET ORAL 2 TIMES DAILY
Qty: 60 TABLET | Refills: 0 | Status: SHIPPED | OUTPATIENT
Start: 2024-11-26

## 2024-11-26 NOTE — PROGRESS NOTES
Jackie is a 44 year old who is being evaluated via a billable video visit.    How would you like to obtain your AVS? MyChart  If the video visit is dropped, the invitation should be resent by: Text to cell phone: [phone]  Will anyone else be joining your video visit? No      Assessment & Plan     (F90.2) Attention deficit hyperactivity disorder (ADHD), combined type  Comment: Stable  Plan: amphetamine-dextroamphetamine (ADDERALL) 30 MG         tablet, amphetamine-dextroamphetamine         (ADDERALL) 30 MG tablet        Continue current medication              Subjective   Jackie is a 44 year old, presenting for the following health issues:  A.D.H.D (Refill medication )        11/26/2024     9:31 AM   Additional Questions   Roomed by ROB aGston     This is a virtual ADHD follow-up  PDMP checked  Medication working well  No palpitations chest pain no headaches appetite is good weight is stable sleeping well    A.D.H.D                   Objective           Vitals:  No vitals were obtained today due to virtual visit.    Physical Exam   GENERAL: alert and no distress  EYES: Eyes grossly normal to inspection.  No discharge or erythema, or obvious scleral/conjunctival abnormalities.  RESP: No audible wheeze, cough, or visible cyanosis.    SKIN: Visible skin clear. No significant rash, abnormal pigmentation or lesions.  NEURO: Cranial nerves grossly intact.  Mentation and speech appropriate for age.  PSYCH: Appropriate affect, tone, and pace of words          Video-Visit Details    Type of service:  Video Visit   Originating Location (pt. Location): Other private space at work    Distant Location (provider location):  On-site  Platform used for Video Visit: Leroy  Signed Electronically by: TOMY Sullivan

## 2024-12-18 ENCOUNTER — MYC REFILL (OUTPATIENT)
Dept: FAMILY MEDICINE | Facility: CLINIC | Age: 44
End: 2024-12-18
Payer: COMMERCIAL

## 2024-12-18 DIAGNOSIS — F90.2 ATTENTION DEFICIT HYPERACTIVITY DISORDER (ADHD), COMBINED TYPE: ICD-10-CM

## 2024-12-19 RX ORDER — DEXTROAMPHETAMINE SACCHARATE, AMPHETAMINE ASPARTATE, DEXTROAMPHETAMINE SULFATE AND AMPHETAMINE SULFATE 7.5; 7.5; 7.5; 7.5 MG/1; MG/1; MG/1; MG/1
30 TABLET ORAL 2 TIMES DAILY
Qty: 60 TABLET | Refills: 0 | Status: SHIPPED | OUTPATIENT
Start: 2024-12-19

## 2024-12-19 NOTE — TELEPHONE ENCOUNTER
Last RX: 11/26/24  Last office visit: 7/30/2024     Future Appointments 12/19/2024 - 6/17/2025      None            Requested Prescriptions   Pending Prescriptions Disp Refills    amphetamine-dextroamphetamine (ADDERALL) 30 MG tablet 60 tablet 0     Sig: Take 1 tablet (30 mg) by mouth 2 times daily.       There is no refill protocol information for this order

## 2024-12-19 NOTE — TELEPHONE ENCOUNTER
RN called [facility] to confirm dose dispensed.  [facility]: [phone]    Technician shared that  #60 tabs on 11/26 were dispensed.    RN attempted to call patient. Left Message: that information will be sent via My Chart.  (Please see My Chart Message).

## 2024-12-19 NOTE — TELEPHONE ENCOUNTER
Incoming call from patient.     Requesting medication amphetamine-dextroamphetamine (ADDERALL) 30 MG tablet to be override.     Per pt, she received her medication on 11/23/24. She did not get the full 60 tabs as prescribed, she received 15 day short on it.     A new prescription was sent today 12/19/24. However, pharmacy will not release it to her until 12/23/24. She is needing an approval from PCP or a covering.     Pt state she is nervous because this keep her focus. She has been taking her medication regularly since 11/23/24 2 tabs daily.   Her last tablet was today, 12/19/24.     I am routing this to care team to follow-up with pharmacy and to PCP to override if appropriate.     Festus FREY RN

## 2024-12-28 ENCOUNTER — MYC REFILL (OUTPATIENT)
Dept: FAMILY MEDICINE | Facility: CLINIC | Age: 44
End: 2024-12-28
Payer: COMMERCIAL

## 2024-12-28 DIAGNOSIS — F41.9 ANXIETY: ICD-10-CM

## 2024-12-30 ENCOUNTER — MYC MEDICAL ADVICE (OUTPATIENT)
Dept: FAMILY MEDICINE | Facility: CLINIC | Age: 44
End: 2024-12-30
Payer: COMMERCIAL

## 2024-12-30 DIAGNOSIS — F41.9 ANXIETY: ICD-10-CM

## 2024-12-30 RX ORDER — ALPRAZOLAM 1 MG/1
TABLET ORAL
Qty: 60 TABLET | Refills: 2 | Status: SHIPPED | OUTPATIENT
Start: 2024-12-30

## 2024-12-30 NOTE — TELEPHONE ENCOUNTER
General Call    Contacts       Contact Date/Time Type Contact Phone/Fax    12/30/2024 10:43 AM CST Phone (Incoming) Jackie Mcdonough (Self) [phone] (M)          Reason for Call:  Early Refill for Xanax, the earliest fill date is 1/10/25.     What are your questions or concerns:  Pt called back and wanted to let Brodie Bar know that she would pay for medication and that her plan is to wean herself off this medication eventually. Today provider submitted a refill for 60 tablets with 2 refills, (enough to last until February 9th). Pt said she called pharmacy and that they would fill with provider approval. Writer spoke with provider and he said he'd send [employer] a message stating that he approves.     Date of last appointment with provider: 11/26/24.

## 2024-12-31 DIAGNOSIS — F41.9 ANXIETY: Primary | ICD-10-CM

## 2024-12-31 RX ORDER — ALPRAZOLAM 0.5 MG
0.5 TABLET ORAL 3 TIMES DAILY PRN
Qty: 21 TABLET | Refills: 0 | Status: SHIPPED | OUTPATIENT
Start: 2024-12-31

## 2024-12-31 NOTE — TELEPHONE ENCOUNTER
Please see My Chart Message:  Please advise if able to send new RX or if patient needs video appointment to discuss.    12/30/24  ALPRAZolam (XANAX) 1 MG tablet     Patient picked up #60 tabs on December 11 th. ( 30 day supply).  Earliest Fill (per pharmacy with 3 day odin) January 7th  Earliest Fill (per insurance is December 9th).    Per 12/28/24 my chart message: patient reports taking extra pills as she has woken up in the middle of the night.    Pharmacy will need written on RX: 12/31/24  Next fill date: February 9, 2025

## 2024-12-31 NOTE — TELEPHONE ENCOUNTER
Patient is calling stating that pharmacy needs provider approval to fill medication.  It appears that pt called the clinic earlier and clinic staff did inform provider about issue.  Provider states he'd send [facility] a message stating he approves.        Advised pt to have pharmacy staff call clinic.  Pt verbalized understanding.    Sara Bowen, RN, BSN Nurse Triage Advisor 12/30/2024 6:40 PM

## 2025-01-09 ENCOUNTER — MYC REFILL (OUTPATIENT)
Dept: FAMILY MEDICINE | Facility: CLINIC | Age: 45
End: 2025-01-09
Payer: COMMERCIAL

## 2025-01-09 ENCOUNTER — MYC MEDICAL ADVICE (OUTPATIENT)
Dept: FAMILY MEDICINE | Facility: CLINIC | Age: 45
End: 2025-01-09
Payer: COMMERCIAL

## 2025-01-09 DIAGNOSIS — F90.2 ATTENTION DEFICIT HYPERACTIVITY DISORDER (ADHD), COMBINED TYPE: ICD-10-CM

## 2025-01-09 RX ORDER — DEXTROAMPHETAMINE SACCHARATE, AMPHETAMINE ASPARTATE, DEXTROAMPHETAMINE SULFATE AND AMPHETAMINE SULFATE 7.5; 7.5; 7.5; 7.5 MG/1; MG/1; MG/1; MG/1
30 TABLET ORAL 2 TIMES DAILY
Qty: 60 TABLET | Refills: 0 | Status: SHIPPED | OUTPATIENT
Start: 2025-01-09

## 2025-01-09 RX ORDER — DEXTROAMPHETAMINE SACCHARATE, AMPHETAMINE ASPARTATE, DEXTROAMPHETAMINE SULFATE AND AMPHETAMINE SULFATE 7.5; 7.5; 7.5; 7.5 MG/1; MG/1; MG/1; MG/1
30 TABLET ORAL 2 TIMES DAILY
Qty: 60 TABLET | Refills: 0 | Status: CANCELLED | OUTPATIENT
Start: 2025-01-09

## 2025-01-09 NOTE — TELEPHONE ENCOUNTER
Routing to provider.     Requesting Adderall 30mg prescription be sent to pharmacy for refill date of 1/19/25.

## 2025-02-04 ENCOUNTER — MYC REFILL (OUTPATIENT)
Dept: FAMILY MEDICINE | Facility: CLINIC | Age: 45
End: 2025-02-04
Payer: COMMERCIAL

## 2025-02-04 DIAGNOSIS — F41.9 ANXIETY: ICD-10-CM

## 2025-02-04 DIAGNOSIS — F90.2 ATTENTION DEFICIT HYPERACTIVITY DISORDER (ADHD), COMBINED TYPE: ICD-10-CM

## 2025-02-05 RX ORDER — ALPRAZOLAM 0.5 MG
0.5 TABLET ORAL 3 TIMES DAILY PRN
Qty: 21 TABLET | Refills: 0 | Status: SHIPPED | OUTPATIENT
Start: 2025-02-05

## 2025-02-05 RX ORDER — DEXTROAMPHETAMINE SACCHARATE, AMPHETAMINE ASPARTATE, DEXTROAMPHETAMINE SULFATE AND AMPHETAMINE SULFATE 7.5; 7.5; 7.5; 7.5 MG/1; MG/1; MG/1; MG/1
30 TABLET ORAL 2 TIMES DAILY
Qty: 60 TABLET | Refills: 0 | Status: SHIPPED | OUTPATIENT
Start: 2025-02-05

## 2025-02-28 ENCOUNTER — MYC REFILL (OUTPATIENT)
Dept: FAMILY MEDICINE | Facility: CLINIC | Age: 45
End: 2025-02-28
Payer: COMMERCIAL

## 2025-02-28 DIAGNOSIS — F90.2 ATTENTION DEFICIT HYPERACTIVITY DISORDER (ADHD), COMBINED TYPE: ICD-10-CM

## 2025-03-03 RX ORDER — DEXTROAMPHETAMINE SACCHARATE, AMPHETAMINE ASPARTATE, DEXTROAMPHETAMINE SULFATE AND AMPHETAMINE SULFATE 7.5; 7.5; 7.5; 7.5 MG/1; MG/1; MG/1; MG/1
30 TABLET ORAL 2 TIMES DAILY
Qty: 60 TABLET | Refills: 0 | Status: SHIPPED | OUTPATIENT
Start: 2025-03-03

## 2025-03-24 ENCOUNTER — MYC REFILL (OUTPATIENT)
Dept: FAMILY MEDICINE | Facility: CLINIC | Age: 45
End: 2025-03-24
Payer: COMMERCIAL

## 2025-03-24 DIAGNOSIS — F41.9 ANXIETY: ICD-10-CM

## 2025-03-24 DIAGNOSIS — F90.2 ATTENTION DEFICIT HYPERACTIVITY DISORDER (ADHD), COMBINED TYPE: ICD-10-CM

## 2025-03-25 RX ORDER — DEXTROAMPHETAMINE SACCHARATE, AMPHETAMINE ASPARTATE, DEXTROAMPHETAMINE SULFATE AND AMPHETAMINE SULFATE 7.5; 7.5; 7.5; 7.5 MG/1; MG/1; MG/1; MG/1
30 TABLET ORAL 2 TIMES DAILY
Qty: 60 TABLET | Refills: 0 | Status: SHIPPED | OUTPATIENT
Start: 2025-03-25

## 2025-03-25 RX ORDER — ALPRAZOLAM 1 MG/1
TABLET ORAL
Qty: 60 TABLET | Refills: 2 | Status: SHIPPED | OUTPATIENT
Start: 2025-03-25

## 2025-04-15 ENCOUNTER — MYC REFILL (OUTPATIENT)
Dept: FAMILY MEDICINE | Facility: CLINIC | Age: 45
End: 2025-04-15
Payer: COMMERCIAL

## 2025-04-15 DIAGNOSIS — F90.2 ATTENTION DEFICIT HYPERACTIVITY DISORDER (ADHD), COMBINED TYPE: ICD-10-CM

## 2025-04-15 DIAGNOSIS — F41.9 ANXIETY: ICD-10-CM

## 2025-04-15 RX ORDER — DEXTROAMPHETAMINE SACCHARATE, AMPHETAMINE ASPARTATE, DEXTROAMPHETAMINE SULFATE AND AMPHETAMINE SULFATE 7.5; 7.5; 7.5; 7.5 MG/1; MG/1; MG/1; MG/1
30 TABLET ORAL 2 TIMES DAILY
Qty: 60 TABLET | Refills: 0 | Status: SHIPPED | OUTPATIENT
Start: 2025-04-15

## 2025-04-15 RX ORDER — ALPRAZOLAM 0.5 MG
0.5 TABLET ORAL 3 TIMES DAILY PRN
Qty: 21 TABLET | Refills: 0 | OUTPATIENT
Start: 2025-04-15

## 2025-04-17 DIAGNOSIS — F41.9 ANXIETY: ICD-10-CM

## 2025-04-17 RX ORDER — ALPRAZOLAM 0.5 MG
TABLET ORAL
Qty: 30 TABLET | Refills: 5 | Status: SHIPPED | OUTPATIENT
Start: 2025-04-17

## 2025-05-10 ENCOUNTER — MYC REFILL (OUTPATIENT)
Dept: FAMILY MEDICINE | Facility: CLINIC | Age: 45
End: 2025-05-10
Payer: COMMERCIAL

## 2025-05-10 DIAGNOSIS — F90.2 ATTENTION DEFICIT HYPERACTIVITY DISORDER (ADHD), COMBINED TYPE: ICD-10-CM

## 2025-05-12 RX ORDER — DEXTROAMPHETAMINE SACCHARATE, AMPHETAMINE ASPARTATE, DEXTROAMPHETAMINE SULFATE AND AMPHETAMINE SULFATE 7.5; 7.5; 7.5; 7.5 MG/1; MG/1; MG/1; MG/1
30 TABLET ORAL 2 TIMES DAILY
Qty: 60 TABLET | Refills: 0 | Status: SHIPPED | OUTPATIENT
Start: 2025-05-12

## 2025-06-02 ENCOUNTER — MYC REFILL (OUTPATIENT)
Dept: FAMILY MEDICINE | Facility: CLINIC | Age: 45
End: 2025-06-02
Payer: COMMERCIAL

## 2025-06-02 DIAGNOSIS — F90.2 ATTENTION DEFICIT HYPERACTIVITY DISORDER (ADHD), COMBINED TYPE: ICD-10-CM

## 2025-06-02 DIAGNOSIS — F41.9 ANXIETY: ICD-10-CM

## 2025-06-03 RX ORDER — DEXTROAMPHETAMINE SACCHARATE, AMPHETAMINE ASPARTATE, DEXTROAMPHETAMINE SULFATE AND AMPHETAMINE SULFATE 7.5; 7.5; 7.5; 7.5 MG/1; MG/1; MG/1; MG/1
30 TABLET ORAL 2 TIMES DAILY
Qty: 60 TABLET | Refills: 0 | Status: SHIPPED | OUTPATIENT
Start: 2025-06-03

## 2025-06-03 RX ORDER — ALPRAZOLAM 0.5 MG
0.5 TABLET ORAL 3 TIMES DAILY PRN
Qty: 30 TABLET | Refills: 5 | Status: SHIPPED | OUTPATIENT
Start: 2025-06-03

## 2025-06-03 NOTE — TELEPHONE ENCOUNTER
Routing refill request to provider for review/approval because:  Patient needs to be seen because:  med check. Last visit 11/2024.

## 2025-06-08 ENCOUNTER — HEALTH MAINTENANCE LETTER (OUTPATIENT)
Age: 45
End: 2025-06-08

## 2025-06-25 ENCOUNTER — TELEPHONE (OUTPATIENT)
Dept: FAMILY MEDICINE | Facility: CLINIC | Age: 45
End: 2025-06-25

## 2025-06-30 ENCOUNTER — PATIENT OUTREACH (OUTPATIENT)
Dept: CARE COORDINATION | Facility: CLINIC | Age: 45
End: 2025-06-30
Payer: COMMERCIAL

## 2025-07-11 ENCOUNTER — ORDERS ONLY (AUTO-RELEASED) (OUTPATIENT)
Dept: FAMILY MEDICINE | Facility: CLINIC | Age: 45
End: 2025-07-11
Payer: COMMERCIAL

## 2025-08-03 ENCOUNTER — MYC REFILL (OUTPATIENT)
Dept: FAMILY MEDICINE | Facility: CLINIC | Age: 45
End: 2025-08-03
Payer: COMMERCIAL

## 2025-08-03 DIAGNOSIS — F90.2 ATTENTION DEFICIT HYPERACTIVITY DISORDER (ADHD), COMBINED TYPE: ICD-10-CM

## 2025-08-03 DIAGNOSIS — F41.9 ANXIETY: ICD-10-CM

## 2025-08-04 RX ORDER — DEXTROAMPHETAMINE SACCHARATE, AMPHETAMINE ASPARTATE, DEXTROAMPHETAMINE SULFATE AND AMPHETAMINE SULFATE 7.5; 7.5; 7.5; 7.5 MG/1; MG/1; MG/1; MG/1
30 TABLET ORAL 2 TIMES DAILY
Qty: 60 TABLET | Refills: 0 | Status: SHIPPED | OUTPATIENT
Start: 2025-08-04

## 2025-08-04 RX ORDER — ALPRAZOLAM 1 MG/1
TABLET ORAL
Qty: 60 TABLET | Refills: 2 | Status: SHIPPED | OUTPATIENT
Start: 2025-08-04

## 2025-08-10 ENCOUNTER — HEALTH MAINTENANCE LETTER (OUTPATIENT)
Age: 45
End: 2025-08-10

## 2025-08-19 DIAGNOSIS — F90.2 ATTENTION DEFICIT HYPERACTIVITY DISORDER (ADHD), COMBINED TYPE: ICD-10-CM

## 2025-08-19 RX ORDER — DEXTROAMPHETAMINE SACCHARATE, AMPHETAMINE ASPARTATE, DEXTROAMPHETAMINE SULFATE AND AMPHETAMINE SULFATE 7.5; 7.5; 7.5; 7.5 MG/1; MG/1; MG/1; MG/1
30 TABLET ORAL 2 TIMES DAILY
Qty: 60 TABLET | Refills: 0 | OUTPATIENT
Start: 2025-08-19

## 2025-08-20 ENCOUNTER — MYC MEDICAL ADVICE (OUTPATIENT)
Dept: FAMILY MEDICINE | Facility: CLINIC | Age: 45
End: 2025-08-20
Payer: COMMERCIAL

## 2025-08-21 ENCOUNTER — TELEPHONE (OUTPATIENT)
Dept: FAMILY MEDICINE | Facility: CLINIC | Age: 45
End: 2025-08-21
Payer: COMMERCIAL

## 2025-08-21 DIAGNOSIS — F90.2 ATTENTION DEFICIT HYPERACTIVITY DISORDER (ADHD), COMBINED TYPE: ICD-10-CM

## 2025-08-21 RX ORDER — DEXTROAMPHETAMINE SACCHARATE, AMPHETAMINE ASPARTATE, DEXTROAMPHETAMINE SULFATE AND AMPHETAMINE SULFATE 7.5; 7.5; 7.5; 7.5 MG/1; MG/1; MG/1; MG/1
30 TABLET ORAL 2 TIMES DAILY
Qty: 6 TABLET | Refills: 0 | Status: SHIPPED | OUTPATIENT
Start: 2025-08-21